# Patient Record
Sex: FEMALE | Race: WHITE | NOT HISPANIC OR LATINO | Employment: FULL TIME | URBAN - METROPOLITAN AREA
[De-identification: names, ages, dates, MRNs, and addresses within clinical notes are randomized per-mention and may not be internally consistent; named-entity substitution may affect disease eponyms.]

---

## 2017-01-05 ENCOUNTER — OFFICE VISIT (OUTPATIENT)
Dept: OTOLARYNGOLOGY | Facility: CLINIC | Age: 52
End: 2017-01-05

## 2017-01-05 VITALS — BODY MASS INDEX: 23.08 KG/M2 | HEIGHT: 64 IN | WEIGHT: 135.2 LBS

## 2017-01-05 DIAGNOSIS — J31.0 CHRONIC RHINITIS: ICD-10-CM

## 2017-01-05 DIAGNOSIS — J32.0 CHRONIC MAXILLARY SINUSITIS: Primary | ICD-10-CM

## 2017-01-05 DIAGNOSIS — J34.3 NASAL TURBINATE HYPERTROPHY: ICD-10-CM

## 2017-01-05 ASSESSMENT — PAIN SCALES - GENERAL: PAINLEVEL: NO PAIN (0)

## 2017-01-05 NOTE — Clinical Note
2017       RE: Iliana Mcfarlane  [address]  [facility] [address]     Dear Colleague,    Thank you for referring your patient, Iliana Mcfarlane, to the [facility] EAR NOSE AND THROAT at [facility]. Please see a copy of my visit note below.    The patient presents with a history of chronic sinusitis, rhinitis, allergies and asthma. These conditions have combined to cause her difficulty chronic eustachian tube dysfunction and coughing. She reports improvement of her symptoms with medical therapy, but as soon as she stops using the Cleocin Nasal Rinses, her symptoms redevelop.  The patient is using claritin and QNASL Nasal Spray as well as Cleocin Nasal Rinses. She has been using Albuterol for her asthma. Her evaluation with Dr. Best Judd was helpful in identifying allergy triggers and he has contributed to her medical management.       All other systems were reviewed and they are either negative or they are not directly pertinent to this Otolaryngology examination.      Past Medical History:    Past Medical History   Diagnosis Date     Allergic rhinitis, cause unspecified      Lumbago      Esophageal reflux      Menarche age 16     Genital herpes      Glaucoma      Menorrhagia      Depression, major      Hoarseness late      Allergic rhinitis late      Anxiety      Reduced vision        Past Surgical History:    Past Surgical History   Procedure Laterality Date     C nonspecific procedure       Upper GI endoscopy     C nonspecific procedure       Tonsillectomy     C nonspecific procedure  85     Oral surgery, wisdom teeth     C nonspecific procedure            C nonspecific procedure       Hysteroscopy, ablation of uterine fibroid     C nonspecific procedure       miscarriage     Surgical history of -        right eye trabeculectomy     Operative hysteroscopy with morcellator  2014     Procedure: OPERATIVE HYSTEROSCOPY  WITH MORCELLATOR;  Surgeon: Maureen Tavarez MD;  Location: [facility] OR     Gi surgery       dairy intolerence - resolved     Eye surgery  2011     trabeculectomy     Tonsillectomy  1987     Head & neck surgery       trabeculectomy, cataract, yag     Ent surgery       tonsillectomy       Medications:      Current outpatient prescriptions:      Lifitegrast (XIIDRA) 5 % SOLN, , Disp: , Rfl:      Beclomethasone Dipropionate (QNASL) 80 MCG/ACT AERS Nasal Spray, Spray 2 sprays into both nostrils daily, Disp: 8.7 g, Rfl: 3     sodium chloride 0.9%, bottle, 0.9 % irrigation, IRRIGATE 20 ML EACH NOSTRIL 4 TIMES DAILY FOR 2 MONTHS., Disp: 1000 mL, Rfl: 3     clindamycin (CLEOCIN) 150 MG capsule, Open one capsule and place in 1 Litre of Normal Saline and mix. Irrigate with 20 cc each nostril QID., Disp: 10 capsule, Rfl: 3     loratadine (CLARITIN) 10 MG tablet, , Disp: , Rfl:      sodium chloride 0.9 % SOLN, Irrigate 20 cc each nostril QID X 2 month supply, Disp: 51754 mL, Rfl: 3     Cyanocobalamin (VITAMIN B 12 PO), , Disp: , Rfl:      escitalopram (LEXAPRO) 20 MG tablet, Take 1 tablet (20 mg) by mouth daily, Disp: 90 tablet, Rfl: PRN     norethindrone-ethinyl estradiol (JUNEL FE 1/20) 1-20 MG-MCG per tablet, Take 1 tablet by mouth daily, Disp: 84 tablet, Rfl: PRN     traZODone (DESYREL) 50 MG tablet, Take 2 tablets (100 mg) by mouth nightly as needed for sleep, Disp: 180 tablet, Rfl: PRN     zolpidem (AMBIEN CR) 6.25 MG CR tablet, Take 1 tablet (6.25 mg) by mouth nightly as needed for sleep, Disp: 90 tablet, Rfl: 1     Omega-3 Fatty Acids (OMEGA-3 FISH OIL PO), Take 4 capsules by mouth daily , Disp: , Rfl:      CALCIUM-MAGNESIUM-ZINC PO, , Disp: , Rfl:      VITAMIN D, CHOLECALCIFEROL, PO, Take by mouth daily, Disp: , Rfl:      VITAMIN K PO, , Disp: , Rfl:     Allergies:    No known drug allergies    Physical Examination:    The patient is a well developed, well nourished female in no apparent distress.  She is normocephalic,  atraumatic with pupils equally round and reactive to light.    Oral Cavity Examination:  Normal mucosa with no masses or lesions  Nasal Examination: Much less congested nasal turbinates and nasal mucosa with no masses or lesions with purulent rhinorrhea. Septal deviation.   Neurological Examination: Facial nerve function intact and symmetric  Integumentary Examination: No lesions on the skin of the head and neck      Assessment and Plan:    The patient presents with a history of allergic rhinitis, asthma, chronic pharyngitis and lingual tonsillitis. The patient will continue to be treated with Normal Nasal Rinses in addition to her current use of Claritin and QNASL Nasal Spray. She will use CREST mouth rinse gargles twice daily and after each use of any inhaler medications. She will be referred to Dr. Sheri Salazar for further evaluation of possible efforts to prevent the sinus infections from recurring as soon as the Cleocin Nasal Rinses are discontinued.     Again, thank you for allowing me to participate in the care of your patient.      Sincerely,    Zion Quintana MD      CC: Dr. Sheri Salazar   CC: Janey Aguirre

## 2017-01-05 NOTE — MR AVS SNAPSHOT
After Visit Summary   1/5/2017    Iliana Mcfarlane    MRN: [id number]           Patient Information     Date Of Birth          1965        Visit Information        Provider Department      1/5/2017 8:45 AM Zion Quintana MD [facility] Ear Nose [facility] Throat        Today's Diagnoses     Chronic maxillary sinusitis    -  1     Chronic rhinitis         Nasal turbinate hypertrophy           Care Instructions    The patient presents with a history of allergic rhinitis, asthma, chronic pharyngitis and lingual tonsillitis. The patient will continue to be treated with Normal Nasal Rinses in addition to her current use of Claritin and QNASL Nasal Spray. She will use CREST mouth rinse gargles twice daily and after each use of any inhaler medications. She will be referred to Dr. Sheri Salazar for further evaluation of possible efforts to prevent the sinus infections from recurring as soon as the Cleocin Nasal Rinses are discontinued.         Follow-ups after your visit        Your next 10 appointments already scheduled     Feb 08, 2017  8:30 AM   (Arrive by 8:15 AM)   New Patient Visit with MD LEE Mason [facility] Ear Nose [facility] Throat ([facility])    [address] [place] [id number] [phone]              Who to contact     Please call your clinic at [phone] to:    Ask questions about your health    Make or cancel appointments    Discuss your medicines    Learn about your test results    Speak to your doctor   If you have compliments or concerns about an experience at your clinic, or if you wish to file a complaint, please contact [facility] Patient Relations at [phone] or email us at [email or web address]         Additional Information About Your Visit        MyChart Information     [employer] gives you secure access to your electronic health record. If you see a primary care provider, you can also  "send messages to your care team and make appointments. If you have questions, please call your primary care clinic.  If you do not have a primary care provider, please call [phone] and they will assist you.      [employer] is an electronic gateway that provides easy, online access to your medical records. With [employer], you can request a clinic appointment, read your test results, renew a prescription or communicate with your care team.     To access your existing account, please contact your [facility] or call [phone] for assistance.        Care EveryWhere ID     This is your Care EveryWhere ID. This could be used by other organizations to access your [facility] medical records  [id number]        Your Vitals Were     Height BMI (Body Mass Index)                1.62 m (5' 3.78\") 23.37 kg/m2           Blood Pressure from Last 3 Encounters:   11/23/16 133/84   09/27/16 122/70   09/23/16 102/66    Weight from Last 3 Encounters:   01/05/17 61.326 kg (135 lb 3.2 oz)   11/23/16 58.514 kg (129 lb)   09/27/16 58.514 kg (129 lb)              Today, you had the following     No orders found for display         Today's Medication Changes          These changes are accurate as of: 1/5/17  8:54 AM.  If you have any questions, ask your nurse or doctor.               Stop taking these medicines if you haven't already. Please contact your care team if you have questions.     albuterol 108 (90 BASE) MCG/ACT Inhaler   Commonly known as:  PROAIR HFA/PROVENTIL HFA/VENTOLIN HFA   Stopped by:  Zion Quintana MD           DOXYCYCLINE HYCLATE PO   Stopped by:  Zion Quintana MD           fluticasone 50 MCG/ACT spray   Commonly known as:  FLONASE   Stopped by:  Zion Quintana MD                    Primary Care Provider Office Phone # Fax #    Janey Aguirre [phone]       [facility] [address] [facility] [address]        Thank " you!     Thank you for choosing [facility] EAR NOSE AND THROAT  for your care. Our goal is always to provide you with excellent care. Hearing back from our patients is one way we can continue to improve our services. Please take a few minutes to complete the written survey that you may receive in the mail after your visit with us. Thank you!             Your Updated Medication List - Protect others around you: Learn how to safely use, store and throw away your medicines at www.disposemymeds.org.          This list is accurate as of: 1/5/17  8:54 AM.  Always use your most recent med list.                   Brand Name Dispense Instructions for use    Beclomethasone Dipropionate 80 MCG/ACT Aers Nasal Spray    QNASL    8.7 g    Spray 2 sprays into both nostrils daily       CALCIUM-MAGNESIUM-ZINC PO          clindamycin 150 MG capsule    CLEOCIN    10 capsule    Open one capsule and place in 1 Litre of Normal Saline and mix. Irrigate with 20 cc each nostril QID.       escitalopram 20 MG tablet    LEXAPRO    90 tablet    Take 1 tablet (20 mg) by mouth daily       loratadine 10 MG tablet    CLARITIN         norethindrone-ethinyl estradiol 1-20 MG-MCG per tablet    JUNEL FE 1/20    84 tablet    Take 1 tablet by mouth daily       OMEGA-3 FISH OIL PO      Take 4 capsules by mouth daily       sodium chloride 0.9 % Soln     41058 mL    Irrigate 20 cc each nostril QID X 2 month supply       sodium chloride 0.9% (bottle) 0.9 % irrigation     1000 mL    IRRIGATE 20 ML EACH NOSTRIL 4 TIMES DAILY FOR 2 MONTHS.       traZODone 50 MG tablet    DESYREL    180 tablet    Take 2 tablets (100 mg) by mouth nightly as needed for sleep       VITAMIN B 12 PO          VITAMIN D (CHOLECALCIFEROL) PO      Take by mouth daily       VITAMIN K PO          XIIDRA 5 % Soln   Generic drug:  Lifitegrast          zolpidem 6.25 MG CR tablet    AMBIEN CR    90 tablet    Take 1 tablet (6.25 mg) by mouth nightly as needed for sleep

## 2017-01-05 NOTE — PATIENT INSTRUCTIONS
The patient presents with a history of allergic rhinitis, asthma, chronic pharyngitis and lingual tonsillitis. The patient will continue to be treated with Normal Nasal Rinses in addition to her current use of Claritin and QNASL Nasal Spray. She will use CREST mouth rinse gargles twice daily and after each use of any inhaler medications. She will be referred to Dr. Sheri Salazar for further evaluation of possible efforts to prevent the sinus infections from recurring as soon as the Cleocin Nasal Rinses are discontinued.

## 2017-01-05 NOTE — PROGRESS NOTES
The patient presents with a history of chronic sinusitis, rhinitis, allergies and asthma. These conditions have combined to cause her difficulty chronic eustachian tube dysfunction and coughing. She reports improvement of her symptoms with medical therapy, but as soon as she stops using the Cleocin Nasal Rinses, her symptoms redevelop.  The patient is using claritin and QNASL Nasal Spray as well as Cleocin Nasal Rinses. She has been using Albuterol for her asthma. Her evaluation with Dr. Best Judd was helpful in identifying allergy triggers and he has contributed to her medical management.       All other systems were reviewed and they are either negative or they are not directly pertinent to this Otolaryngology examination.      Past Medical History:    Past Medical History   Diagnosis Date     Allergic rhinitis, cause unspecified      Lumbago      Esophageal reflux      Menarche age 16     Genital herpes      Glaucoma      Menorrhagia      Depression, major      Hoarseness late      Allergic rhinitis late      Anxiety      Reduced vision        Past Surgical History:    Past Surgical History   Procedure Laterality Date     C nonspecific procedure       Upper GI endoscopy     C nonspecific procedure       Tonsillectomy     C nonspecific procedure  85     Oral surgery, wisdom teeth     C nonspecific procedure            C nonspecific procedure       Hysteroscopy, ablation of uterine fibroid     C nonspecific procedure       miscarriage     Surgical history of -        right eye trabeculectomy     Operative hysteroscopy with morcellator  2014     Procedure: OPERATIVE HYSTEROSCOPY WITH MORCELLATOR;  Surgeon: Maureen Tavarez MD;  [facility]: [facility] OR     Gi surgery       dairy intolerence - resolved     Eye surgery       trabeculectomy     Tonsillectomy  1987     Head & neck surgery       trabeculectomy, cataract, yag     Ent surgery       tonsillectomy        Medications:      Current outpatient prescriptions:      Lifitegrast (XIIDRA) 5 % SOLN, , Disp: , Rfl:      Beclomethasone Dipropionate (QNASL) 80 MCG/ACT AERS Nasal Spray, Spray 2 sprays into both nostrils daily, Disp: 8.7 g, Rfl: 3     sodium chloride 0.9%, bottle, 0.9 % irrigation, IRRIGATE 20 ML EACH NOSTRIL 4 TIMES DAILY FOR 2 MONTHS., Disp: 1000 mL, Rfl: 3     clindamycin (CLEOCIN) 150 MG capsule, Open one capsule and place in 1 Litre of Normal Saline and mix. Irrigate with 20 cc each nostril QID., Disp: 10 capsule, Rfl: 3     loratadine (CLARITIN) 10 MG tablet, , Disp: , Rfl:      sodium chloride 0.9 % SOLN, Irrigate 20 cc each nostril QID X 2 month supply, Disp: 01007 mL, Rfl: 3     Cyanocobalamin (VITAMIN B 12 PO), , Disp: , Rfl:      escitalopram (LEXAPRO) 20 MG tablet, Take 1 tablet (20 mg) by mouth daily, Disp: 90 tablet, Rfl: PRN     norethindrone-ethinyl estradiol (JUNEL FE 1/20) 1-20 MG-MCG per tablet, Take 1 tablet by mouth daily, Disp: 84 tablet, Rfl: PRN     traZODone (DESYREL) 50 MG tablet, Take 2 tablets (100 mg) by mouth nightly as needed for sleep, Disp: 180 tablet, Rfl: PRN     zolpidem (AMBIEN CR) 6.25 MG CR tablet, Take 1 tablet (6.25 mg) by mouth nightly as needed for sleep, Disp: 90 tablet, Rfl: 1     Omega-3 Fatty Acids (OMEGA-3 FISH OIL PO), Take 4 capsules by mouth daily , Disp: , Rfl:      CALCIUM-MAGNESIUM-ZINC PO, , Disp: , Rfl:      VITAMIN D, CHOLECALCIFEROL, PO, Take by mouth daily, Disp: , Rfl:      VITAMIN K PO, , Disp: , Rfl:     Allergies:    No known drug allergies    Physical Examination:    The patient is a well developed, well nourished female in no apparent distress.  She is normocephalic, atraumatic with pupils equally round and reactive to light.    Oral Cavity Examination:  Normal mucosa with no masses or lesions  Nasal Examination: Much less congested nasal turbinates and nasal mucosa with no masses or lesions with purulent rhinorrhea. Septal deviation.    Neurological Examination: Facial nerve function intact and symmetric  Integumentary Examination: No lesions on the skin of the head and neck      Assessment and Plan:    The patient presents with a history of allergic rhinitis, asthma, chronic pharyngitis and lingual tonsillitis. The patient will continue to be treated with Normal Nasal Rinses in addition to her current use of Claritin and QNASL Nasal Spray. She will use CREST mouth rinse gargles twice daily and after each use of any inhaler medications. She will be referred to Dr. Sheri Salazar for further evaluation of possible efforts to prevent the sinus infections from recurring as soon as the Cleocin Nasal Rinses are discontinued.     CC: Dr. Sheri Salazar   CC: Janey Aguirre

## 2017-01-16 ENCOUNTER — MYC MEDICAL ADVICE (OUTPATIENT)
Dept: FAMILY MEDICINE | Facility: CLINIC | Age: 52
End: 2017-01-16

## 2017-01-16 DIAGNOSIS — F51.01 PRIMARY INSOMNIA: Primary | ICD-10-CM

## 2017-01-16 RX ORDER — TRAZODONE HYDROCHLORIDE 50 MG/1
100 TABLET, FILM COATED ORAL
Qty: 180 TABLET | Refills: 2 | Status: SHIPPED | OUTPATIENT
Start: 2017-01-16 | End: 2017-07-19

## 2017-01-19 ENCOUNTER — HOSPITAL ENCOUNTER (EMERGENCY)
Facility: CLINIC | Age: 52
Discharge: HOME | End: 2017-01-20
Attending: EMERGENCY MEDICINE | Admitting: EMERGENCY MEDICINE
Payer: COMMERCIAL

## 2017-01-19 ENCOUNTER — OFFICE VISIT (OUTPATIENT)
Dept: URGENT CARE | Facility: URGENT CARE | Age: 52
End: 2017-01-19
Payer: COMMERCIAL

## 2017-01-19 VITALS
HEIGHT: 64 IN | OXYGEN SATURATION: 98 % | DIASTOLIC BLOOD PRESSURE: 90 MMHG | WEIGHT: 133 LBS | BODY MASS INDEX: 22.71 KG/M2 | TEMPERATURE: 97.9 F | SYSTOLIC BLOOD PRESSURE: 149 MMHG | HEART RATE: 90 BPM

## 2017-01-19 DIAGNOSIS — R07.9 ACUTE CHEST PAIN: ICD-10-CM

## 2017-01-19 DIAGNOSIS — R07.89 ATYPICAL CHEST PAIN: ICD-10-CM

## 2017-01-19 LAB
ANION GAP SERPL CALCULATED.3IONS-SCNC: 6 MMOL/L (ref 3–14)
BASOPHILS # BLD AUTO: 0 10E9/L (ref 0–0.2)
BASOPHILS NFR BLD AUTO: 0.2 %
BUN SERPL-MCNC: 14 MG/DL (ref 7–30)
CALCIUM SERPL-MCNC: 8.9 MG/DL (ref 8.5–10.1)
CHLORIDE SERPL-SCNC: 104 MMOL/L (ref 94–109)
CO2 SERPL-SCNC: 29 MMOL/L (ref 20–32)
CREAT SERPL-MCNC: 0.89 MG/DL (ref 0.52–1.04)
DIFFERENTIAL METHOD BLD: NORMAL
EOSINOPHIL # BLD AUTO: 0.1 10E9/L (ref 0–0.7)
EOSINOPHIL NFR BLD AUTO: 1.2 %
ERYTHROCYTE [DISTWIDTH] IN BLOOD BY AUTOMATED COUNT: 13.7 % (ref 10–15)
GFR SERPL CREATININE-BSD FRML MDRD: 67 ML/MIN/1.7M2
GLUCOSE SERPL-MCNC: 94 MG/DL (ref 70–99)
HCT VFR BLD AUTO: 39.2 % (ref 35–47)
HGB BLD-MCNC: 12.9 G/DL (ref 11.7–15.7)
IMM GRANULOCYTES # BLD: 0 10E9/L (ref 0–0.4)
IMM GRANULOCYTES NFR BLD: 0.2 %
INTERPRETATION ECG - MUSE: NORMAL
LYMPHOCYTES # BLD AUTO: 2.7 10E9/L (ref 0.8–5.3)
LYMPHOCYTES NFR BLD AUTO: 41.8 %
MCH RBC QN AUTO: 31.2 PG (ref 26.5–33)
MCHC RBC AUTO-ENTMCNC: 32.9 G/DL (ref 31.5–36.5)
MCV RBC AUTO: 95 FL (ref 78–100)
MONOCYTES # BLD AUTO: 0.3 10E9/L (ref 0–1.3)
MONOCYTES NFR BLD AUTO: 4.7 %
NEUTROPHILS # BLD AUTO: 3.4 10E9/L (ref 1.6–8.3)
NEUTROPHILS NFR BLD AUTO: 51.9 %
NRBC # BLD AUTO: 0 10*3/UL
NRBC BLD AUTO-RTO: 0 /100
PLATELET # BLD AUTO: 247 10E9/L (ref 150–450)
POTASSIUM SERPL-SCNC: 3.4 MMOL/L (ref 3.4–5.3)
RBC # BLD AUTO: 4.13 10E12/L (ref 3.8–5.2)
SODIUM SERPL-SCNC: 139 MMOL/L (ref 133–144)
TROPONIN I BLD-MCNC: 0 UG/L (ref 0–0.1)
TROPONIN I SERPL-MCNC: NORMAL UG/L (ref 0–0.04)
TROPONIN I SERPL-MCNC: NORMAL UG/L (ref 0–0.04)
WBC # BLD AUTO: 6.4 10E9/L (ref 4–11)

## 2017-01-19 PROCEDURE — 84484 ASSAY OF TROPONIN QUANT: CPT | Performed by: EMERGENCY MEDICINE

## 2017-01-19 PROCEDURE — 99284 EMERGENCY DEPT VISIT MOD MDM: CPT | Performed by: EMERGENCY MEDICINE

## 2017-01-19 PROCEDURE — 93005 ELECTROCARDIOGRAM TRACING: CPT | Performed by: EMERGENCY MEDICINE

## 2017-01-19 PROCEDURE — 93010 ELECTROCARDIOGRAM REPORT: CPT | Mod: Z6 | Performed by: EMERGENCY MEDICINE

## 2017-01-19 PROCEDURE — 84484 ASSAY OF TROPONIN QUANT: CPT

## 2017-01-19 PROCEDURE — 99214 OFFICE O/P EST MOD 30 MIN: CPT | Performed by: PHYSICIAN ASSISTANT

## 2017-01-19 PROCEDURE — 80048 BASIC METABOLIC PNL TOTAL CA: CPT | Performed by: EMERGENCY MEDICINE

## 2017-01-19 PROCEDURE — 93000 ELECTROCARDIOGRAM COMPLETE: CPT | Performed by: PHYSICIAN ASSISTANT

## 2017-01-19 PROCEDURE — 85025 COMPLETE CBC W/AUTO DIFF WBC: CPT | Performed by: EMERGENCY MEDICINE

## 2017-01-19 PROCEDURE — 99284 EMERGENCY DEPT VISIT MOD MDM: CPT | Mod: 25 | Performed by: EMERGENCY MEDICINE

## 2017-01-19 ASSESSMENT — ENCOUNTER SYMPTOMS
DIAPHORESIS: 0
SHORTNESS OF BREATH: 0
NERVOUS/ANXIOUS: 1
PALPITATIONS: 0
CHEST TIGHTNESS: 1

## 2017-01-19 NOTE — ED AVS SNAPSHOT
[facility], Emergency Department    [address] [facility] [address]    Phone:  [phone]                                       Iliana Mcfarlane   MRN: [id number]    Department:  [facility], Emergency Department   Date of Visit:  1/19/2017           Patient Information     Date Of Birth          1965        Your diagnoses for this visit were:     Atypical chest pain        You were seen by Addy Aguilar MD.        Discharge Instructions          *CHEST PAIN, UNCERTAIN CAUSE    Based on your exam today, the exact cause of your chest pain is not certain. Your condition does not seem serious at this time, and your pain does not appear to be coming from your heart. However, sometimes the signs of a serious problem take more time to appear. Therefore, watch for the warning signs listed below.  HOME CARE:  1. Rest today and avoid strenuous activity.  2. Take any prescribed medicine as directed.  FOLLOW UP with your doctor in 1-3 days.   GET PROMPT MEDICAL ATTENTION if any of the following occur:    A change in the type of pain: if it feels different, becomes more severe, lasts longer, or begins to spread into your shoulder, arm, neck, jaw or back    Shortness of breath or increased pain with breathing    Weakness, dizziness, or fainting    Cough with blood or dark colored sputum (phlegm)    Fever over 101  F (38.3  C)    Swelling, pain or redness in one leg    9871-6035 [place] [address]. All rights reserved. This information is not intended as a substitute for professional medical care. Always follow your healthcare professional's instructions.      Future Appointments        Provider Department Dept Phone Center    2/8/2017 8:30 AM Sheri Salazar MD [facility] Ear Nose and Throat [phone] [facility]      24 Hour Appointment Hotline       To make an appointment at any [facility], call [phone] ([phone]). If you don't have a family  doctor or clinic, we will help you find one. [facility] clinics are conveniently located to serve the needs of you and your family.             Review of your medicines      Our records show that you are taking the medicines listed below. If these are incorrect, please call your family doctor or clinic.        Dose / Directions Last dose taken    Beclomethasone Dipropionate 80 MCG/ACT Aers Nasal Spray   Commonly known as:  QNASL   Dose:  2 spray   Quantity:  8.7 g        Spray 2 sprays into both nostrils daily   Refills:  3        CALCIUM-MAGNESIUM-ZINC PO        Refills:  0        clindamycin 150 MG capsule   Commonly known as:  CLEOCIN   Quantity:  10 capsule        Open one capsule and place in 1 Litre of Normal Saline and mix. Irrigate with 20 cc each nostril QID.   Refills:  3        escitalopram 20 MG tablet   Commonly known as:  LEXAPRO   Dose:  20 mg   Quantity:  90 tablet        Take 1 tablet (20 mg) by mouth daily   Refills:  PRN        loratadine 10 MG tablet   Commonly known as:  CLARITIN        Refills:  0        norethindrone-ethinyl estradiol 1-20 MG-MCG per tablet   Commonly known as:  JUNEL FE 1/20   Dose:  1 tablet   Quantity:  84 tablet        Take 1 tablet by mouth daily   Refills:  PRN        OMEGA-3 FISH OIL PO   Dose:  4 capsule        Take 4 capsules by mouth daily   Refills:  0        sodium chloride 0.9 % Soln   Quantity:  48051 mL        Irrigate 20 cc each nostril QID X 2 month supply   Refills:  3        sodium chloride 0.9% (bottle) 0.9 % irrigation   Quantity:  1000 mL        IRRIGATE 20 ML EACH NOSTRIL 4 TIMES DAILY FOR 2 MONTHS.   Refills:  3        traZODone 50 MG tablet   Commonly known as:  DESYREL   Dose:  100 mg   Quantity:  180 tablet        Take 2 tablets (100 mg) by mouth nightly as needed for sleep   Refills:  2        VITAMIN B 12 PO        Refills:  0        VITAMIN D (CHOLECALCIFEROL) PO        Take by mouth daily   Refills:  0        VITAMIN K PO        Refills:  0         XIIDRA 5 % Soln   Generic drug:  Lifitegrast        Refills:  0        zolpidem 6.25 MG CR tablet   Commonly known as:  AMBIEN CR   Dose:  6.25 mg   Quantity:  90 tablet        Take 1 tablet (6.25 mg) by mouth nightly as needed for sleep   Refills:  1                Procedures and tests performed during your visit     Procedure/Test Number of Times Performed    Basic metabolic panel 1    CBC with platelets differential 1    EKG 12 lead 1    Troponin I 2    Troponin POCT 1      Orders Needing Specimen Collection     None      Pending Results     No orders found for last 2 day(s).            Pending Culture Results     No orders found for last 2 day(s).            Thank you for choosing [facility]       Thank you for choosing [facility] for your care. Our goal is always to provide you with excellent care. Hearing back from our patients is one way we can continue to improve our services. Please take a few minutes to complete the written survey that you may receive in the mail after you visit with us. Thank you!        [employer] Information     [employer] gives you secure access to your electronic health record. If you see a primary care provider, you can also send messages to your care team and make appointments. If you have questions, please call your primary care clinic.  If you do not have a primary care provider, please call [phone] and they will assist you.        Care EveryWhere ID     This is your Care EveryWhere ID. This could be used by other organizations to access your [facility] medical records  [id number]        After Visit Summary       This is your record. Keep this with you and show to your community pharmacist(s) and doctor(s) at your next visit.

## 2017-01-19 NOTE — ED AVS SNAPSHOT
[facility] [place], Emergency Department    [address] [place] [address]    Phone:  [phone]                                       Iliana Mcfarlane   MRN: [id number]    Department:  [facility], Emergency Department   Date of Visit:  1/19/2017           After Visit Summary Signature Page     I have received my discharge instructions, and my questions have been answered. I have discussed any challenges I see with this plan with the nurse or doctor.    ..........................................................................................................................................  Patient/Patient Representative Signature      ..........................................................................................................................................  Patient Representative Print Name and Relationship to Patient    ..................................................               ................................................  Date                                            Time    ..........................................................................................................................................  Reviewed by Signature/Title    ...................................................              ..............................................  Date                                                            Time

## 2017-01-19 NOTE — MR AVS SNAPSHOT
After Visit Summary   1/19/2017    Iliana Mcfarlane    MRN: [id number]           Patient Information     Date Of Birth          1965        Visit Information        Provider Department      1/19/2017 7:00 PM Lizette Jimenez PA-C [facility]        Today's Diagnoses     Chest pain    -  1        Follow-ups after your visit        Your next 10 appointments already scheduled     Feb 08, 2017  8:30 AM   (Arrive by 8:15 AM)   New Patient Visit with Sheri Salazar MD   [facility] ([facility] [place])    [address] [place] [id number] [phone]              Who to contact     If you have questions or need follow up information about today's clinic visit or your schedule please contact [facility] directly at [phone].  Normal or non-critical lab and imaging results will be communicated to you by MyChart, letter or phone within 4 business days after the clinic has received the results. If you do not hear from us within 7 days, please contact the clinic through [employer] or phone. If you have a critical or abnormal lab result, we will notify you by phone as soon as possible.  Submit refill requests through "[employer] or call your pharmacy and they will forward the refill request to us. Please allow 3 business days for your refill to be completed.          Additional Information About Your Visit        MyChart Information     "[employer] gives you secure access to your electronic health record. If you see a primary care provider, you can also send messages to your care team and make appointments. If you have questions, please call your primary care clinic.  If you do not have a primary care provider, please call [phone] and they will assist you.        Care EveryWhere ID     This is your Care EveryWhere ID. This could be used by other organizations to access your [facility]  "records  [id number]        Your Vitals Were     Pulse Temperature Height BMI (Body Mass Index) Pulse Oximetry       90 97.9  F (36.6  C) (Tympanic) 5' 4\" (1.626 m) 22.82 kg/m2 98%        Blood Pressure from Last 3 Encounters:   01/19/17 149/90   11/23/16 133/84   09/27/16 122/70    Weight from Last 3 Encounters:   01/19/17 133 lb (60.328 kg)   01/05/17 135 lb 3.2 oz (61.326 kg)   11/23/16 129 lb (58.514 kg)              We Performed the Following     EKG 12-lead complete w/read - Clinics        Primary Care Provider Office Phone # Fax #    Janey Aguirre [phone]       [facility] [address] [place]  [facility] [address]        Thank you!     Thank you for choosing [facility]RG[facility]  for your care. Our goal is always to provide you with excellent care. Hearing back from our patients is one way we can continue to improve our services. Please take a few minutes to complete the written survey that you may receive in the mail after your visit with us. Thank you!             Your Updated Medication List - Protect others around you: Learn how to safely use, store and throw away your medicines at www.disposemymeds.org.          This list is accurate as of: 1/19/17  7:59 PM.  Always use your most recent med list.                   Brand Name Dispense Instructions for use    Beclomethasone Dipropionate 80 MCG/ACT Aers Nasal Spray    QNASL    8.7 g    Spray 2 sprays into both nostrils daily       CALCIUM-MAGNESIUM-ZINC PO          clindamycin 150 MG capsule    CLEOCIN    10 capsule    Open one capsule and place in 1 Litre of Normal Saline and mix. Irrigate with 20 cc each nostril QID.       escitalopram 20 MG tablet    LEXAPRO    90 tablet    Take 1 tablet (20 mg) by mouth daily       loratadine 10 MG tablet    CLARITIN         norethindrone-ethinyl estradiol 1-20 MG-MCG per tablet    JUNEL FE 1/20    84 tablet    Take 1 tablet by mouth daily       OMEGA-3 FISH OIL PO     "  Take 4 capsules by mouth daily       sodium chloride 0.9 % Soln     43752 mL    Irrigate 20 cc each nostril QID X 2 month supply       sodium chloride 0.9% (bottle) 0.9 % irrigation     1000 mL    IRRIGATE 20 ML EACH NOSTRIL 4 TIMES DAILY FOR 2 MONTHS.       traZODone 50 MG tablet    DESYREL    180 tablet    Take 2 tablets (100 mg) by mouth nightly as needed for sleep       VITAMIN B 12 PO          VITAMIN D (CHOLECALCIFEROL) PO      Take by mouth daily       VITAMIN K PO          XIIDRA 5 % Soln   Generic drug:  Lifitegrast          zolpidem 6.25 MG CR tablet    AMBIEN CR    90 tablet    Take 1 tablet (6.25 mg) by mouth nightly as needed for sleep

## 2017-01-20 VITALS
BODY MASS INDEX: 22.2 KG/M2 | OXYGEN SATURATION: 97 % | HEART RATE: 84 BPM | DIASTOLIC BLOOD PRESSURE: 89 MMHG | RESPIRATION RATE: 9 BRPM | SYSTOLIC BLOOD PRESSURE: 137 MMHG | WEIGHT: 130 LBS | HEIGHT: 64 IN | TEMPERATURE: 98.6 F

## 2017-01-20 NOTE — DISCHARGE INSTRUCTIONS
*CHEST PAIN, UNCERTAIN CAUSE    Based on your exam today, the exact cause of your chest pain is not certain. Your condition does not seem serious at this time, and your pain does not appear to be coming from your heart. However, sometimes the signs of a serious problem take more time to appear. Therefore, watch for the warning signs listed below.  HOME CARE:  1. Rest today and avoid strenuous activity.  2. Take any prescribed medicine as directed.  FOLLOW UP with your doctor in 1-3 days.   GET PROMPT MEDICAL ATTENTION if any of the following occur:    A change in the type of pain: if it feels different, becomes more severe, lasts longer, or begins to spread into your shoulder, arm, neck, jaw or back    Shortness of breath or increased pain with breathing    Weakness, dizziness, or fainting    Cough with blood or dark colored sputum (phlegm)    Fever over 101  F (38.3  C)    Swelling, pain or redness in one leg    [id number] [address], [place] [address]. All rights reserved. This information is not intended as a substitute for professional medical care. Always follow your healthcare professional's instructions.

## 2017-01-20 NOTE — ED NOTES
Pt is sent to us from clinic to have a cardiac work up. Has had chest tightness for one week, some SOB today, now resolved. H/O anxiety also. EKG from clinic shows SR.

## 2017-01-20 NOTE — NURSING NOTE
"Chief Complaint   Patient presents with     Urgent Care     Pt in clinic to have eval for SOB, headache, chest tightness and pain, and tingling of the hands.     Respiratory Problems     Chest Pain     Numbness     Headache       Initial /90 mmHg  Pulse 90  Temp(Src) 97.9  F (36.6  C) (Tympanic)  Ht 5' 4\" (1.626 m)  Wt 133 lb (60.328 kg)  BMI 22.82 kg/m2  SpO2 98% Estimated body mass index is 22.82 kg/(m^2) as calculated from the following:    Height as of this encounter: 5' 4\" (1.626 m).    Weight as of this encounter: 133 lb (60.328 kg).  BP completed using cuff size: regular  Antonette Pretty/ MA    "

## 2017-01-20 NOTE — ED PROVIDER NOTES
[facility] EMERGENCY DEPARTMENT ([facility])  2017  ED 22   History     Chief Complaint   Patient presents with     Chest Pain     The history is provided by the patient and medical records.     Iliana Mcfarlane is a 52 year old female who presents from clinic for further evaluation of chest pain. She presented to clinic this evening with left sided chest tightness for the past week.  She had an EKG done and was sent to the ER for troponins and further workup. Patient reports chest pain that radiates into her left upper back. The pain comes and goes. When it comes it lasts for a few hours. She states that today the pain was the worst that it has been. No shortness of breath, palpitations, diaphoresis, or fatigue. Her pain has improved and she thinks her chest pain is anxiety related.     I have reviewed the Medications, Allergies, Past Medical and Surgical History, and Social History in the [employer] system.  PAST MEDICAL HISTORY:   Past Medical History   Diagnosis Date     Allergic rhinitis, cause unspecified      Lumbago      Esophageal reflux      Menarche age 16     Genital herpes      Glaucoma      Menorrhagia      Depression, major      Hoarseness late      Allergic rhinitis late      Anxiety      Reduced vision        PAST SURGICAL HISTORY:   Past Surgical History   Procedure Laterality Date     C nonspecific procedure       Upper GI endoscopy     C nonspecific procedure       Tonsillectomy     C nonspecific procedure  87     Oral surgery, wisdom teeth     C nonspecific procedure            C nonspecific procedure       Hysteroscopy, ablation of uterine fibroid     C nonspecific procedure       miscarriage     Surgical history of -        right eye trabeculectomy     Operative hysteroscopy with morcellator  2014     Procedure: OPERATIVE HYSTEROSCOPY WITH MORCELLATOR;  Surgeon: Maureen Tavarez MD;  Location: [facility] OR     Gi surgery       dairy intolerence -  resolved     Eye surgery  2011     trabeculectomy     Tonsillectomy  1987     Head & neck surgery       trabeculectomy, cataract, yag     Ent surgery       tonsillectomy       FAMILY HISTORY:   Family History   Problem Relation Age of Onset     Hypertension Mother      DIABETES Paternal Grandmother      Alcohol/Drug Paternal Grandfather      HEART DISEASE Maternal Grandmother      CEREBROVASCULAR DISEASE Paternal Grandmother      Breast Cancer Maternal Aunt      CANCER Maternal Aunt      brain     Alzheimer Disease Maternal Grandmother      not until mid-90 yrs     CANCER Father      CANCER Maternal Grandfather      CANCER Paternal Grandfather      Other Cancer Father      Obesity Mother        SOCIAL HISTORY:   Social History   Substance Use Topics     Smoking status: Never Smoker      Smokeless tobacco: Never Used     Alcohol Use: 0.0 oz/week     0 Standard drinks or equivalent per week      Comment: minimal       Discharge Medication List as of 1/20/2017 12:11 AM      CONTINUE these medications which have NOT CHANGED    Details   Lifitegrast (XIIDRA) 5 % SOLN Historical      traZODone (DESYREL) 50 MG tablet Take 2 tablets (100 mg) by mouth nightly as needed for sleep, Disp-180 tablet, R-2, E-PrescribeRefill until 8/31/17      Beclomethasone Dipropionate (QNASL) 80 MCG/ACT AERS Nasal Spray Spray 2 sprays into both nostrils daily, Disp-8.7 g, R-3, Fax      sodium chloride 0.9%, bottle, 0.9 % irrigation IRRIGATE 20 ML EACH NOSTRIL 4 TIMES DAILY FOR 2 MONTHS., Disp-1000 mL, R-3, Fax      clindamycin (CLEOCIN) 150 MG capsule Open one capsule and place in 1 Litre of Normal Saline and mix. Irrigate with 20 cc each nostril QID., Disp-10 capsule, R-3, Fax      loratadine (CLARITIN) 10 MG tablet Historical      sodium chloride 0.9 % SOLN Irrigate 20 cc each nostril QID X 2 month supplyDisp-10366 mL, [id number]      Cyanocobalamin (VITAMIN B 12 PO) Historical      escitalopram (LEXAPRO) 20 MG tablet Take 1 tablet (20  "mg) by mouth daily, Disp-90 tablet, R-PRN, Fax      norethindrone-ethinyl estradiol (JUNEL FE 1/20) 1-20 MG-MCG per tablet Take 1 tablet by mouth daily, Disp-84 tablet, R-PRN, Fax      zolpidem (AMBIEN CR) 6.25 MG CR tablet Take 1 tablet (6.25 mg) by mouth nightly as needed for sleep, Disp-90 tablet, R-1, Local Print      Omega-3 Fatty Acids (OMEGA-3 FISH OIL PO) Take 4 capsules by mouth daily , Historical      CALCIUM-MAGNESIUM-ZINC PO Historical      VITAMIN D, CHOLECALCIFEROL, PO Take by mouth daily, Historical      VITAMIN K PO Historical                Allergies   Allergen Reactions     No Known Drug Allergies         Review of Systems   Constitutional: Negative for diaphoresis.   Respiratory: Positive for chest tightness. Negative for shortness of breath.    Cardiovascular: Positive for chest pain. Negative for palpitations.   Psychiatric/Behavioral: The patient is nervous/anxious.    All other systems reviewed and are negative.      Physical Exam   BP: (!) 145/91 mmHg  Pulse: 84  Temp: 98.6  F (37  C)  Resp: 16  Height: 162.6 cm (5' 4\")  Weight: 58.968 kg (130 lb)  SpO2: 98 %  Physical Exam Exam:  Constitutional: healthy, alert and no distress  Head: Normocephalic. No masses, lesions, tenderness or abnormalities  Neck: Neck supple. No adenopathy. Thyroid symmetric, normal size,, Carotids without bruits.  ENT: ENT exam normal, no neck nodes or sinus tenderness  Cardiovascular: negative, PMI normal. No lifts, heaves, or thrills. RRR. No murmurs, clicks gallops or rub  Respiratory: negative, Percussion normal. Good diaphragmatic excursion. Lungs clear  Gastrointestinal: Abdomen soft, non-tender. BS normal. No masses, organomegaly  : Deferred  Musculoskeletal: extremities normal- no gross deformities noted, gait normal and normal muscle tone  Skin: no suspicious lesions or rashes  Neurologic: Gait normal. Reflexes normal and symmetric. Sensation grossly WNL.  Psychiatric: mentation appears normal and affect " normal/bright  Hematologic/Lymphatic/Immunologic: Normal cervical lymph nodes      ED Course   Procedures             EKG Interpretation:      Interpreted by Addy Aguilar  Time reviewed: 21:07 PM  Symptoms at time of EKG: chest tightness   Rhythm: normal sinus   Rate: 79 bpm  Axis: normal  Ectopy: none  Conduction: normal  ST Segments/ T Waves: No ST-T wave changes  Q Waves: none  Comparison to prior: Unchanged from earlier today    Clinical Impression: normal EKG          Critical Care time:  none               Labs Ordered and Resulted from Time of ED Arrival Up to the Time of Departure from the ED   CBC WITH PLATELETS DIFFERENTIAL   BASIC METABOLIC PANEL   TROPONIN I   TROPONIN I   TROPONIN POCT       Assessments & Plan (with Medical Decision Making)     This is a 52-year-old female who is here with left-sided chest pain that is described as fairly sudden in onset.  Patient has had these symptoms for the last week and is here for further evaluation.  Physical exam is unremarkable with no evidence of any acute tenderness or any other evidence of acute findings.  EKG also as noted above.  Troponin I was checked for any possible ischemic episode and essentially normal.  2nd troponin was also to look for any evidence of changes.  I was also normal.  I do not believe that this is any type of ischemic episode involving the hard nor do I think this is PE related symptoms is no evidence of any shortness of breath and tachycardia.  Patient to be discharged home with atypical chest pain instructions to follow-up and return if any worsening symptoms.  I have reviewed the nursing notes.    I have reviewed the findings, diagnosis, plan and need for follow up with the patient.    Discharge Medication List as of 1/20/2017 12:11 AM          Final diagnoses:   Atypical chest pain   Noah OLMEDO, am serving as a trained medical scribe to document services personally performed by Addy Aguilar MD, based on the provider's statements  to me.      I, Addy Aguilar MD, was physically present and have reviewed and verified the accuracy of this note documented by Noah Power.       1/19/2017   [facility], [place], EMERGENCY DEPARTMENT      Addy Aguilar MD  02/03/17 2726

## 2017-01-20 NOTE — PROGRESS NOTES
"HPI  Iliana is a 51 yo female who presents for left sided Chest tightness x 1 week.  Reports tightness worse today and she also has been experiencing SOB today.  No N&V.  No diaphoresis. Reports tingling in her fingers BL.  Reports frontal and maxillary headache. Headache is dull and is not the worst headache of her life.  She denies dizziness or being lightheaded.    Reports several surgeries for glaucoma this year and has had some recurrent double vision that is attributed to \"dry eyes\" by her provider.  She reports her vision feels a bit worse today.    Hx of recurrent Sinus infections and is seeing specialist about this.  Reports night cough that she attributes to post nasal drip.     Patient states she does have a hx of anxiety.  She sometimes takes Trazodone at night for this.   She has had GERD in the past.     Reports no personal or family hx of HTN or heart disease.      ROS    See HPI  Physical Exam    Vitals & nursing notes reviewed.  B/P: 149/90, T: 97.9, P: 90, R: Data Unavailable  Constitutional:  Alert, well nourished, well-developed, NAD  Head:  Atraumatic, normocephalic  Eyes:  Perrla, EOMI, conjunctiva:  Pink   Sclera:  Anicteric  Ears:  Canals clear BL, TM pearly BL  Throat:  No erythema, exudates, or edema to postoropharynx  Neck:  Supple, (+) cervical LAD  Lungs:  CTA, no wheezes, rhonchi, or rales  CV:  RRR,  no murmur appreciated    EKG:  Sinus rhythm.  Low Voltage in precordial leads.  No ST changes.    ASSESSMENT  (R07.9) Chest pain  (primary encounter diagnosis)  Comment: Consulted with Dr. Dennison who felt it was best to sent patient to ER for Troponins & further workup/eval.   As EKG did not indicate ST changes, Felt it was safe for patient to go to ER by car.     Plan: Patient to go to [facility] ER [facility].    [facility] know [facility] was coming.        "

## 2017-02-08 ENCOUNTER — OFFICE VISIT (OUTPATIENT)
Dept: OTOLARYNGOLOGY | Facility: CLINIC | Age: 52
End: 2017-02-08

## 2017-02-08 VITALS — WEIGHT: 138 LBS | BODY MASS INDEX: 23.56 KG/M2 | HEIGHT: 64 IN

## 2017-02-08 DIAGNOSIS — R05.9 COUGH: Primary | ICD-10-CM

## 2017-02-08 DIAGNOSIS — J31.0 CHRONIC RHINITIS: ICD-10-CM

## 2017-02-08 ASSESSMENT — PAIN SCALES - GENERAL: PAINLEVEL: MILD PAIN (3)

## 2017-02-08 NOTE — MR AVS SNAPSHOT
After Visit Summary   2017    Iliana Mcfarlane    MRN: [id number]           Patient Information     Date Of Birth          1965        Visit Information        Provider Department      2017 8:30 AM Sheri Salazar MD [facility]        Today's Diagnoses     Cough    -  1    Chronic rhinitis          Care Instructions    Plan of care:  Take Zantac as discussed with Dr Salazar  Follow up as needed  Clinic contact information:  1. To schedule an appointment call [phone], option 1  2. To talk to the Triage RN call [phone], option 3  3. If you need to speak to Maureen or get a message to your doctor on a Friday, call the triage RN  4. MaureenRN: [phone]  5. Surgery scheduling:      Marj Boston: [phone]      Arely Parr: [phone]  6. Fax: [phone]  7. [phone]          Follow-ups after your visit        Who to contact     Please call your clinic at [phone] to:    Ask questions about your health    Make or cancel appointments    Discuss your medicines    Learn about your test results    Speak to your doctor   If you have compliments or concerns about an experience at your clinic, or if you wish to file a complaint, please contact [facility] Patient Relations at [phone] or email us at [email or web address]         Additional Information About Your Visit        MyChart Information     [employer] gives you secure access to your electronic health record. If you see a primary care provider, you can also send messages to your care team and make appointments. If you have questions, please call your primary care clinic.  If you do not have a primary care provider, please call [phone] and they will assist you.      [employer] is an electronic gateway that provides easy, online access to your medical records. With [employer], you can request a clinic appointment, read your test results, renew a prescription  "or communicate with your care team.     To access your existing account, please contact your [facility] or call [phone] for assistance.        Care EveryWhere ID     This is your Care EveryWhere ID. This could be used by other organizations to access your [place] medical records  [id number]        Your Vitals Were     Height Last Period BMI (Body Mass Index)             1.613 m (5' 3.5\") 12/30/2016 (Approximate) 24.06 kg/m2          Blood Pressure from Last 3 Encounters:   01/20/17 137/89   01/19/17 149/90   11/23/16 133/84    Weight from Last 3 Encounters:   02/08/17 62.6 kg (138 lb)   01/19/17 59 kg (130 lb)   01/19/17 60.3 kg (133 lb)              Today, you had the following     No orders found for display       Primary Care Provider Office Phone # Fax #    Janey Aguirre [phone]       [facility] [address] [facility] [address]        Thank you!     Thank you for choosing [facility]  for your care. Our goal is always to provide you with excellent care. Hearing back from our patients is one way we can continue to improve our services. Please take a few minutes to complete the written survey that you may receive in the mail after your visit with us. Thank you!             Your Updated Medication List - Protect others around you: Learn how to safely use, store and throw away your medicines at www.disposemymeds.org.          This list is accurate as of: 2/8/17 11:59 PM.  Always use your most recent med list.                   Brand Name Dispense Instructions for use    Beclomethasone Dipropionate 80 MCG/ACT Aers Nasal Spray    QNASL    8.7 g    Spray 2 sprays into both nostrils daily       CALCIUM-MAGNESIUM-ZINC PO          clindamycin 150 MG capsule    CLEOCIN    10 capsule    Open one capsule and place in 1 Litre of Normal Saline and mix. Irrigate with 20 cc each nostril QID.       escitalopram 20 MG tablet    " LEXAPRO    90 tablet    Take 1 tablet (20 mg) by mouth daily       FLUVIRIN Susp   Generic drug:  Influenza Vac Typ A&B Surf Ant      ADM 0.5ML IM UTD       loratadine 10 MG tablet    CLARITIN         norethindrone-ethinyl estradiol 1-20 MG-MCG per tablet    JUNEL FE 1/20    84 tablet    Take 1 tablet by mouth daily       OMEGA-3 FISH OIL PO      Take 4 capsules by mouth daily       sodium chloride 0.9 % Soln     30832 mL    Irrigate 20 cc each nostril QID X 2 month supply       sodium chloride 0.9% (bottle) 0.9 % irrigation     1000 mL    IRRIGATE 20 ML EACH NOSTRIL 4 TIMES DAILY FOR 2 MONTHS.       traZODone 50 MG tablet    DESYREL    180 tablet    Take 2 tablets (100 mg) by mouth nightly as needed for sleep       VITAMIN B 12 PO          VITAMIN D (CHOLECALCIFEROL) PO      Take by mouth daily       VITAMIN K PO          XIIDRA 5 % Soln   Generic drug:  Lifitegrast          zolpidem 6.25 MG CR tablet    AMBIEN CR    90 tablet    Take 1 tablet (6.25 mg) by mouth nightly as needed for sleep

## 2017-02-08 NOTE — PROGRESS NOTES
HISTORY OF PRESENT ILLNESS:  Iliana Mcfarlane is a very pleasant woman who has seen Dr. Quintana and Dr. Judd for chronic rhinitis issues.  She has a known history of allergies.  She has had immunotherapy in the past.  She states that since last July, she has been having intermittent problems with earaches, nasal discharge, and coughing.  She is currently using Qnasl and had been on Flonase previously.  She is using saline irrigations, and Dr. Quintana had her on Cleocin irrigations.  She was concerned because after Cleocin irrigations, she would develop recurrent bouts of yellow thick green nasal drainage and would have to go back on Cleocin frequently.  She has had a CT scan during this timeframe which was completely normal with regards to her paranasal sinuses.  She has a nighttime cough.  She had used an inhaler in the past and that was helpful.  She states that she feels like a smoker.  This sometimes wakes her from sleep.  She does feel like it is associated with postnasal drainage.  She is concerned that her postnasal drainage causes heartburn symptoms.  She has never been treated for this.      PHYSICAL EXAMINATION:  Her examination today reveals an adult female in no acute distress.  Head, face, scalp are normal.  Ears demonstrate normal canals, auricles and tympanic membranes.  She has no TMJ tenderness but she does have some mild masseter tenderness on the left.  The oral cavity and oropharyngeal exam is unremarkable.  Nasal exam reveals a straight septum.  She has mucus stranding anteriorly.  Otherwise, nasal passages look quite healthy.  No cervical adenopathy.      ASSESSMENT AND PLAN:  This patient has chronic rhinitis, no evidence of rhinosinusitis.  She also has an irritating nighttime cough that affects her sleep which could be due to reflux disease.  I explained to her that postnasal drip does not cause reflux but it could be a sign of reflux.  She is willing to take some Zantac at night before  bed to see if this helps.  I reassured her that her CT scan was normal and that the symptoms that she is experiencing are more consistent with rhinitis and that this can be treated topically with saline irrigations.  If she has thick drainage, she could go ahead and use a Cleocin rinse as well.  The patient also has a dry crackly voice which tells me she is a little too dry.  I encouraged increased water intake and reduction of any medications that could be drying her out and also to increase humidity in her environment.  She is amenable to this as well.  She will see me back if she does not have improvement in her symptoms.  She seems comfortable with this plan.

## 2017-02-08 NOTE — LETTER
2/8/2017       RE: Iliana Mcfarlane  [address]  [facility] [address]     Dear Colleague,    Thank you for referring your patient, Iliana Mcfarlane, to the [facility] EAR NO[facility] AND THROAT at [facility]. Please see a copy of my visit note below.    HISTORY OF PRESENT ILLNESS:  Iliana Mcfarlane is a very pleasant woman who has seen Dr. Quintana and Dr. Judd for chronic rhinitis issues.  She has a known history of allergies.  She has had immunotherapy in the past.  She states that since last July, she has been having intermittent problems with earaches, nasal discharge, and coughing.  She is currently using Qnasl and had been on Flonase previously.  She is using saline irrigations, and Dr. Quintana had her on Cleocin irrigations.  She was concerned because after Cleocin irrigations, she would develop recurrent bouts of yellow thick green nasal drainage and would have to go back on Cleocin frequently.  She has had a CT scan during this timeframe which was completely normal with regards to her paranasal sinuses.  She has a nighttime cough.  She had used an inhaler in the past and that was helpful.  She states that she feels like a smoker.  This sometimes wakes her from sleep.  She does feel like it is associated with postnasal drainage.  She is concerned that her postnasal drainage causes heartburn symptoms.  She has never been treated for this.      PHYSICAL EXAMINATION:  Her examination today reveals an adult female in no acute distress.  Head, face, scalp are normal.  Ears demonstrate normal canals, auricles and tympanic membranes.  She has no TMJ tenderness but she does have some mild masseter tenderness on the left.  The oral cavity and oropharyngeal exam is unremarkable.  Nasal exam reveals a straight septum.  She has mucus stranding anteriorly.  Otherwise, nasal passages look quite healthy.  No cervical adenopathy.      ASSESSMENT AND PLAN:  This patient has  chronic rhinitis, no evidence of rhinosinusitis.  She also has an irritating nighttime cough that affects her sleep which could be due to reflux disease.  I explained to her that postnasal drip does not cause reflux but it could be a sign of reflux.  She is willing to take some Zantac at night before bed to see if this helps.  I reassured her that her CT scan was normal and that the symptoms that she is experiencing are more consistent with rhinitis and that this can be treated topically with saline irrigations.  If she has thick drainage, she could go ahead and use a Cleocin rinse as well.  The patient also has a dry crackly voice which tells me she is a little too dry.  I encouraged increased water intake and reduction of any medications that could be drying her out and also to increase humidity in her environment.  She is amenable to this as well.  She will see me back if she does not have improvement in her symptoms.  She seems comfortable with this plan.       Again, thank you for allowing me to participate in the care of your patient.      Sincerely,    Sheri Salazar MD

## 2017-02-08 NOTE — PATIENT INSTRUCTIONS
Plan of care:  Take Zantac as discussed with Dr Salazar  Follow up as needed  Clinic contact information:  1. To schedule an appointment call [phone], option 1  2. To talk to the Triage RN call [phone], option 3  3. If you need to speak to Maureen or get a message to your doctor on a Friday, call the triage RN  4. MaureenRN: [phone]  5. Surgery scheduling:      Marj Boston: [phone]      Arely Parr: [phone]  6. Fax: [phone]  7. [phone]

## 2017-02-08 NOTE — NURSING NOTE
Chief Complaint   Patient presents with     Consult     chronic maxiallry sinusitis     Marta Kwong Medical Assistant

## 2017-03-22 ENCOUNTER — OFFICE VISIT (OUTPATIENT)
Dept: FAMILY MEDICINE | Facility: CLINIC | Age: 52
End: 2017-03-22
Payer: COMMERCIAL

## 2017-03-22 VITALS
DIASTOLIC BLOOD PRESSURE: 85 MMHG | SYSTOLIC BLOOD PRESSURE: 124 MMHG | HEART RATE: 84 BPM | OXYGEN SATURATION: 97 % | BODY MASS INDEX: 24.3 KG/M2 | WEIGHT: 139.38 LBS | TEMPERATURE: 98.2 F | RESPIRATION RATE: 18 BRPM

## 2017-03-22 DIAGNOSIS — F51.01 PRIMARY INSOMNIA: ICD-10-CM

## 2017-03-22 DIAGNOSIS — Z12.11 SPECIAL SCREENING FOR MALIGNANT NEOPLASMS, COLON: ICD-10-CM

## 2017-03-22 DIAGNOSIS — F41.9 ANXIETY: Primary | ICD-10-CM

## 2017-03-22 PROCEDURE — 99214 OFFICE O/P EST MOD 30 MIN: CPT | Performed by: NURSE PRACTITIONER

## 2017-03-22 RX ORDER — BUSPIRONE HYDROCHLORIDE 5 MG/1
TABLET ORAL
Qty: 150 TABLET | Refills: 0 | Status: SHIPPED | OUTPATIENT
Start: 2017-03-22 | End: 2017-04-11

## 2017-03-22 RX ORDER — ZOLPIDEM TARTRATE 6.25 MG/1
6.25 TABLET, FILM COATED, EXTENDED RELEASE ORAL
Qty: 90 TABLET | Refills: 1 | Status: SHIPPED | OUTPATIENT
Start: 2017-03-22 | End: 2017-07-19

## 2017-03-22 ASSESSMENT — ANXIETY QUESTIONNAIRES
GAD7 TOTAL SCORE: 8
6. BECOMING EASILY ANNOYED OR IRRITABLE: MORE THAN HALF THE DAYS
2. NOT BEING ABLE TO STOP OR CONTROL WORRYING: NOT AT ALL
3. WORRYING TOO MUCH ABOUT DIFFERENT THINGS: NOT AT ALL
5. BEING SO RESTLESS THAT IT IS HARD TO SIT STILL: NOT AT ALL
1. FEELING NERVOUS, ANXIOUS, OR ON EDGE: NEARLY EVERY DAY
7. FEELING AFRAID AS IF SOMETHING AWFUL MIGHT HAPPEN: NOT AT ALL

## 2017-03-22 ASSESSMENT — PATIENT HEALTH QUESTIONNAIRE - PHQ9: 5. POOR APPETITE OR OVEREATING: NEARLY EVERY DAY

## 2017-03-22 NOTE — PATIENT INSTRUCTIONS
Start at 5 mg twice daily for 3 days, then 7.5 mg (1.5 tabs) twice daily for 3 days, then 10 mg (2 tabs) twice daily for 3 days, then 12.5 mg (2.5 tabs) twice daily for 3 days, then 15 mg (3 tabs) twice daily and stay at that dose    Follow up in one month.
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
## 2017-03-22 NOTE — MR AVS SNAPSHOT
After Visit Summary   3/22/2017    Iliana Mcfarlane    MRN: [id number]           Patient Information     Date Of Birth          1965        Visit Information        Provider Department      3/22/2017 10:30 AM Janey Aguirre NP [facility]        Today's Diagnoses     Anxiety    -  1    Primary insomnia          Care Instructions    Start at 5 mg twice daily for 3 days, then 7.5 mg (1.5 tabs) twice daily for 3 days, then 10 mg (2 tabs) twice daily for 3 days, then 12.5 mg (2.5 tabs) twice daily for 3 days, then 15 mg (3 tabs) twice daily and stay at that dose    Follow up in one month.         Follow-ups after your visit        Who to contact     If you have questions or need follow up information about today's clinic visit or your schedule please contact [facility] directly at [phone].  Normal or non-critical lab and imaging results will be communicated to you by MyChart, letter or phone within 4 business days after the clinic has received the results. If you do not hear from us within 7 days, please contact the clinic through [employer] or phone. If you have a critical or abnormal lab result, we will notify you by phone as soon as possible.  Submit refill requests through [employer] or call your pharmacy and they will forward the refill request to us. Please allow 3 business days for your refill to be completed.          Additional Information About Your Visit        MyChart Information     [employer] gives you secure access to your electronic health record. If you see a primary care provider, you can also send messages to your care team and make appointments. If you have questions, please call your primary care clinic.  If you do not have a primary care provider, please call [phone] and they will assist you.        Care EveryWhere ID     This is your Care EveryWhere ID. This could be used by other organizations to access your [facility]  records  [id number]        Your Vitals Were     Pulse Temperature Respirations Pulse Oximetry BMI (Body Mass Index)       84 98.2  F (36.8  C) (Oral) 18 97% 24.3 kg/m2        Blood Pressure from Last 3 Encounters:   03/22/17 124/85   01/20/17 137/89   01/19/17 149/90    Weight from Last 3 Encounters:   03/22/17 139 lb 6 oz (63.2 kg)   02/08/17 138 lb (62.6 kg)   01/19/17 130 lb (59 kg)              Today, you had the following     No orders found for display         Today's Medication Changes          These changes are accurate as of: 3/22/17 11:16 AM.  If you have any questions, ask your nurse or doctor.               Start taking these medicines.        Dose/Directions    busPIRone 5 MG tablet   Commonly known as:  BUSPAR   Used for:  Anxiety        Start at 5 mg twice daily for 3 days, then 7.5 mg (1.5 tabs) twice daily for 3 days, then 10 mg (2 tabs) twice daily for 3 days, then 12.5 mg (2.5 tabs) twice daily for 3 days, then 15 mg (3 tabs) twice daily and stay at that dose   Quantity:  150 tablet   Refills:  0         These medicines have changed or have updated prescriptions.        Dose/Directions    clindamycin 150 MG capsule   Commonly known as:  CLEOCIN   This may have changed:    - when to take this  - reasons to take this  - additional instructions   Used for:  Chronic maxillary sinusitis, Chronic rhinitis, Nasal turbinate hypertrophy, Deviated nasal septum, Uncomplicated asthma, unspecified asthma severity        Open one capsule and place in 1 Litre of Normal Saline and mix. Irrigate with 20 cc each nostril QID.   Quantity:  10 capsule   Refills:  3            Where to get your medicines      Some of these will need a paper prescription and others can be bought over the counter.  Ask your nurse if you have questions.     Bring a paper prescription for each of these medications     busPIRone 5 MG tablet    zolpidem 6.25 MG CR tablet                Primary Care Provider Office Phone # Fax #    Janey  LEE Aguirre [phone]       [facility] [address] [place]  [facility] [address]        Thank you!     Thank you for choosing [facility]  for your care. Our goal is always to provide you with excellent care. Hearing back from our patients is one way we can continue to improve our services. Please take a few minutes to complete the written survey that you may receive in the mail after your visit with us. Thank you!             Your Updated Medication List - Protect others around you: Learn how to safely use, store and throw away your medicines at www.disposemymeds.org.          This list is accurate as of: 3/22/17 11:16 AM.  Always use your most recent med list.                   Brand Name Dispense Instructions for use    Beclomethasone Dipropionate 80 MCG/ACT Aers Nasal Spray    QNASL    8.7 g    Spray 2 sprays into both nostrils daily       busPIRone 5 MG tablet    BUSPAR    150 tablet    Start at 5 mg twice daily for 3 days, then 7.5 mg (1.5 tabs) twice daily for 3 days, then 10 mg (2 tabs) twice daily for 3 days, then 12.5 mg (2.5 tabs) twice daily for 3 days, then 15 mg (3 tabs) twice daily and stay at that dose       CALCIUM-MAGNESIUM-ZINC PO          clindamycin 150 MG capsule    CLEOCIN    10 capsule    Open one capsule and place in 1 Litre of Normal Saline and mix. Irrigate with 20 cc each nostril QID.       escitalopram 20 MG tablet    LEXAPRO    90 tablet    Take 1 tablet (20 mg) by mouth daily       loratadine 10 MG tablet    CLARITIN     as needed Reported on 3/22/2017       norethindrone-ethinyl estradiol 1-20 MG-MCG per tablet    JUNEL FE 1/20    84 tablet    Take 1 tablet by mouth daily       OMEGA-3 FISH OIL PO      Take 4 capsules by mouth daily       sodium chloride 0.9 % Soln     27416 mL    Irrigate 20 cc each nostril QID X 2 month supply       sodium chloride 0.9% (bottle) 0.9 % irrigation     1000 mL    IRRIGATE 20 ML EACH NOSTRIL 4 TIMES DAILY  FOR 2 MONTHS.       traZODone 50 MG tablet    DESYREL    180 tablet    Take 2 tablets (100 mg) by mouth nightly as needed for sleep       VITAMIN B 12 PO          VITAMIN D (CHOLECALCIFEROL) PO      Take by mouth daily       VITAMIN K PO          XIIDRA 5 % Soln   Generic drug:  Lifitegrast          zolpidem 6.25 MG CR tablet    AMBIEN CR    90 tablet    Take 1 tablet (6.25 mg) by mouth nightly as needed for sleep

## 2017-03-22 NOTE — LETTER
My Depression Action Plan  Name: Iliana Mcfarlane   Date of Birth 1965  Date: 3/22/2017    My doctor: Janey Aguirre   My clinic: [address] [address]  [phone]          GREEN    ZONE   Good Control    What it looks like:     Things are going generally well. You have normal up s and down s. You may even feel depressed from time to time, but bad moods usually last less than a day.   What you need to do:  1. Continue to care for yourself (see self care plan)  2. Check your depression survival kit and update it as needed  3. Follow your physician s recommendations including any medication.  4. Do not stop taking medication unless you consult with your physician first.           YELLOW         ZONE Getting Worse    What it looks like:     Depression is starting to interfere with your life.     It may be hard to get out of bed; you may be starting to isolate yourself from others.    Symptoms of depression are starting to last most all day and this has happened for several days.     You may have suicidal thoughts but they are not constant.   What you need to do:     1. Call your care team, your response to treatment will improve if you keep your care team informed of your progress. Yellow periods are signs an adjustment may need to be made.     2. Continue your self-care, even if you have to fake it!    3. Talk to someone in your support network    4. Open up your depression survival kit           RED    ZONE Medical Alert - Get Help    What it looks like:     Depression is seriously interfering with your life.     You may experience these or other symptoms: You can t get out of bed most days, can t work or engage in other necessary activities, you have trouble taking care of basic hygiene, or basic responsibilities, thoughts of suicide or death that will not go away, self-injurious behavior.     What you need to do:  1. Call your care team  and request a same-day appointment. If they are not available (weekends or after hours) call your local crisis line, emergency room or 911.      Electronically signed by: Netta Reyes, March 22, 2017    Depression Self Care Plan / Survival Kit    Self-Care for Depression  Here s the deal. Your body and mind are really not as separate as most people think.  What you do and think affects how you feel and how you feel influences what you do and think. This means if you do things that people who feel good do, it will help you feel better.  Sometimes this is all it takes.  There is also a place for medication and therapy depending on how severe your depression is, so be sure to consult with your medical provider and/ or Behavioral Health Consultant if your symptoms are worsening or not improving.     In order to better manage my stress, I will:    Exercise  Get some form of exercise, every day. This will help reduce pain and release endorphins, the  feel good  chemicals in your brain. This is almost as good as taking antidepressants!  This is not the same as joining a gym and then never going! (they count on that by the way ) It can be as simple as just going for a walk or doing some gardening, anything that will get you moving.      Hygiene   Maintain good hygiene (Get out of bed in the morning, Make your bed, Brush your teeth, Take a shower, and Get dressed like you were going to work, even if you are unemployed).  If your clothes don't fit try to get ones that do.    Diet  I will strive to eat foods that are good for me, drink plenty of water, and avoid excessive sugar, caffeine, alcohol, and other mood-altering substances.  Some foods that are helpful in depression are: complex carbohydrates, B vitamins, flaxseed, fish or fish oil, fresh fruits and vegetables.    Psychotherapy  I agree to participate in Individual Therapy (if recommended).    Medication  If prescribed medications, I agree to take them.  Missing doses  can result in serious side effects.  I understand that drinking alcohol, or other illicit drug use, may cause potential side effects.  I will not stop my medication abruptly without first discussing it with my provider.    Staying Connected With Others  I will stay in touch with my friends, family members, and my primary care provider/team.    Use your imagination  Be creative.  We all have a creative side; it doesn t matter if it s oil painting, sand castles, or mud pies! This will also kick up the endorphins.    Witness Beauty  (AKA stop and smell the roses) Take a look outside, even in mid-winter. Notice colors, textures. Watch the squirrels and birds.     Service to others  Be of service to others.  There is always someone else in need.  By helping others we can  get out of ourselves  and remember the really important things.  This also provides opportunities for practicing all the other parts of the program.    Humor  Laugh and be silly!  Adjust your TV habits for less news and crime-drama and more comedy.    Control your stress  Try breathing deep, massage therapy, biofeedback, and meditation. Find time to relax each day.     My support system    Clinic Contact:  Phone number:    Contact 1:  Phone number:    Contact 2:  Phone number:    [employer]ahaoism/:  Phone number:    Therapist:  Phone number:    Local crisis center:    Phone number:    Other community support:  Phone number:

## 2017-03-22 NOTE — NURSING NOTE
"Chief Complaint   Patient presents with     Recheck Medication       Initial /85  Pulse 84  Temp 98.2  F (36.8  C) (Oral)  Resp 18  Wt 139 lb 6 oz (63.2 kg)  SpO2 97%  BMI 24.3 kg/m2 Estimated body mass index is 24.3 kg/(m^2) as calculated from the following:    Height as of 2/8/17: 5' 3.5\" (1.613 m).    Weight as of this encounter: 139 lb 6 oz (63.2 kg).  Medication Reconciliation: complete     Netta Reyes MA      "

## 2017-03-22 NOTE — LETTER
My Depression Action Plan  Name: Iliana Mcfarlane   Date of Birth 1965  Date: 3/22/2017    My doctor: Janey Aguirre   My clinic: [address] [address]  [phone]          GREEN    ZONE   Good Control    What it looks like:     Things are going generally well. You have normal up s and down s. You may even feel depressed from time to time, but bad moods usually last less than a day.   What you need to do:  1. Continue to care for yourself (see self care plan)  2. Check your depression survival kit and update it as needed  3. Follow your physician s recommendations including any medication.  4. Do not stop taking medication unless you consult with your physician first.           YELLOW         ZONE Getting Worse    What it looks like:     Depression is starting to interfere with your life.     It may be hard to get out of bed; you may be starting to isolate yourself from others.    Symptoms of depression are starting to last most all day and this has happened for several days.     You may have suicidal thoughts but they are not constant.   What you need to do:     1. Call your care team, your response to treatment will improve if you keep your care team informed of your progress. Yellow periods are signs an adjustment may need to be made.     2. Continue your self-care, even if you have to fake it!    3. Talk to someone in your support network    4. Open up your depression survival kit           RED    ZONE Medical Alert - Get Help    What it looks like:     Depression is seriously interfering with your life.     You may experience these or other symptoms: You can t get out of bed most days, can t work or engage in other necessary activities, you have trouble taking care of basic hygiene, or basic responsibilities, thoughts of suicide or death that will not go away, self-injurious behavior.     What you need to do:  1. Call your care team  and request a same-day appointment. If they are not available (weekends or after hours) call your local crisis line, emergency room or 911.      Electronically signed by: Netta Reyes, March 22, 2017    Depression Self Care Plan / Survival Kit    Self-Care for Depression  Here s the deal. Your body and mind are really not as separate as most people think.  What you do and think affects how you feel and how you feel influences what you do and think. This means if you do things that people who feel good do, it will help you feel better.  Sometimes this is all it takes.  There is also a place for medication and therapy depending on how severe your depression is, so be sure to consult with your medical provider and/ or Behavioral Health Consultant if your symptoms are worsening or not improving.     In order to better manage my stress, I will:    Exercise  Get some form of exercise, every day. This will help reduce pain and release endorphins, the  feel good  chemicals in your brain. This is almost as good as taking antidepressants!  This is not the same as joining a gym and then never going! (they count on that by the way ) It can be as simple as just going for a walk or doing some gardening, anything that will get you moving.      Hygiene   Maintain good hygiene (Get out of bed in the morning, Make your bed, Brush your teeth, Take a shower, and Get dressed like you were going to work, even if you are unemployed).  If your clothes don't fit try to get ones that do.    Diet  I will strive to eat foods that are good for me, drink plenty of water, and avoid excessive sugar, caffeine, alcohol, and other mood-altering substances.  Some foods that are helpful in depression are: complex carbohydrates, B vitamins, flaxseed, fish or fish oil, fresh fruits and vegetables.    Psychotherapy  I agree to participate in Individual Therapy (if recommended).    Medication  If prescribed medications, I agree to take them.  Missing doses  can result in serious side effects.  I understand that drinking alcohol, or other illicit drug use, may cause potential side effects.  I will not stop my medication abruptly without first discussing it with my provider.    Staying Connected With Others  I will stay in touch with my friends, family members, and my primary care provider/team.    Use your imagination  Be creative.  We all have a creative side; it doesn t matter if it s oil painting, sand castles, or mud pies! This will also kick up the endorphins.    Witness Beauty  (AKA stop and smell the roses) Take a look outside, even in mid-winter. Notice colors, textures. Watch the squirrels and birds.     Service to others  Be of service to others.  There is always someone else in need.  By helping others we can  get out of ourselves  and remember the really important things.  This also provides opportunities for practicing all the other parts of the program.    Humor  Laugh and be silly!  Adjust your TV habits for less news and crime-drama and more comedy.    Control your stress  Try breathing deep, massage therapy, biofeedback, and meditation. Find time to relax each day.     My support system    Clinic Contact:  Phone number:    Contact 1:  Phone number:    Contact 2:  Phone number:    Christianity/:  Phone number:    Therapist:  Phone number:    Local crisis center:    Phone number:    Other community support:  Phone number:

## 2017-03-23 ASSESSMENT — ANXIETY QUESTIONNAIRES: GAD7 TOTAL SCORE: 8

## 2017-03-23 ASSESSMENT — PATIENT HEALTH QUESTIONNAIRE - PHQ9: SUM OF ALL RESPONSES TO PHQ QUESTIONS 1-9: 10

## 2017-04-11 ENCOUNTER — OFFICE VISIT (OUTPATIENT)
Dept: FAMILY MEDICINE | Facility: CLINIC | Age: 52
End: 2017-04-11
Payer: COMMERCIAL

## 2017-04-11 VITALS
SYSTOLIC BLOOD PRESSURE: 124 MMHG | HEART RATE: 85 BPM | OXYGEN SATURATION: 97 % | RESPIRATION RATE: 18 BRPM | DIASTOLIC BLOOD PRESSURE: 80 MMHG | TEMPERATURE: 98.2 F | BODY MASS INDEX: 24.61 KG/M2 | WEIGHT: 141.13 LBS

## 2017-04-11 DIAGNOSIS — F41.9 ANXIETY: Primary | ICD-10-CM

## 2017-04-11 DIAGNOSIS — Z13.220 SCREENING CHOLESTEROL LEVEL: ICD-10-CM

## 2017-04-11 LAB
CHOLEST SERPL-MCNC: 233 MG/DL
HDLC SERPL-MCNC: 84 MG/DL
LDLC SERPL CALC-MCNC: 131 MG/DL
NONHDLC SERPL-MCNC: 149 MG/DL
TRIGL SERPL-MCNC: 91 MG/DL

## 2017-04-11 PROCEDURE — 36415 COLL VENOUS BLD VENIPUNCTURE: CPT | Performed by: NURSE PRACTITIONER

## 2017-04-11 PROCEDURE — 80061 LIPID PANEL: CPT | Performed by: NURSE PRACTITIONER

## 2017-04-11 PROCEDURE — 99213 OFFICE O/P EST LOW 20 MIN: CPT | Performed by: NURSE PRACTITIONER

## 2017-04-11 RX ORDER — BUSPIRONE HYDROCHLORIDE 15 MG/1
15 TABLET ORAL 3 TIMES DAILY
Qty: 270 TABLET | Refills: 3 | Status: SHIPPED | OUTPATIENT
Start: 2017-04-11 | End: 2018-05-11

## 2017-04-11 NOTE — MR AVS SNAPSHOT
After Visit Summary   4/11/2017    Iliana Mcfarlane    MRN: [id number]           Patient Information     Date Of Birth          1965        Visit Information        Provider Department      4/11/2017 8:30 AM Janey Aguirre NP [facility]        Today's Diagnoses     Anxiety    -  1    Screening cholesterol level           Follow-ups after your visit        Who to contact     If you have questions or need follow up information about today's clinic visit or your schedule please contact [facility] directly at [phone].  Normal or non-critical lab and imaging results will be communicated to you by [employer], letter or phone within 4 business days after the clinic has received the results. If you do not hear from us within 7 days, please contact the clinic through [employer] or phone. If you have a critical or abnormal lab result, we will notify you by phone as soon as possible.  Submit refill requests through [employer] or call your pharmacy and they will forward the refill request to us. Please allow 3 business days for your refill to be completed.          Additional Information About Your Visit        MyChart Information     [employer] gives you secure access to your electronic health record. If you see a primary care provider, you can also send messages to your care team and make appointments. If you have questions, please call your primary care clinic.  If you do not have a primary care provider, please call [phone] and they will assist you.        Care EveryWhere ID     This is your Care EveryWhere ID. This could be used by other organizations to access your [place] medical records  [id number]        Your Vitals Were     Pulse Temperature Respirations Pulse Oximetry BMI (Body Mass Index)       85 98.2  F (36.8  C) (Oral) 18 97% 24.61 kg/m2        Blood Pressure from Last 3 Encounters:   04/11/17 124/80   03/22/17 124/85   01/20/17 137/89    Weight  from Last 3 Encounters:   04/11/17 141 lb 2 oz (64 kg)   03/22/17 139 lb 6 oz (63.2 kg)   02/08/17 138 lb (62.6 kg)              We Performed the Following     DEPRESSION ACTION PLAN (DAP)     Lipid Profile with reflex to direct LDL          Today's Medication Changes          These changes are accurate as of: 4/11/17  9:02 AM.  If you have any questions, ask your nurse or doctor.               These medicines have changed or have updated prescriptions.        Dose/Directions    busPIRone 15 MG tablet   Commonly known as:  BUSPAR   This may have changed:    - medication strength  - how much to take  - how to take this  - when to take this  - additional instructions   Used for:  Anxiety   Changed by:  Janey Aguirre NP        Dose:  15 mg   Take 1 tablet (15 mg) by mouth 3 times daily   Quantity:  270 tablet   Refills:  3       clindamycin 150 MG capsule   Commonly known as:  CLEOCIN   This may have changed:    - when to take this  - reasons to take this  - additional instructions   Used for:  Chronic maxillary sinusitis, Chronic rhinitis, Nasal turbinate hypertrophy, Deviated nasal septum, Uncomplicated asthma, unspecified asthma severity        Open one capsule and place in 1 Litre of Normal Saline and mix. Irrigate with 20 cc each nostril QID.   Quantity:  10 capsule   Refills:  3            Where to get your medicines      These medications were sent to [facility] - [address] [address]     Phone:  [phone]     busPIRone 15 MG tablet                Primary Care Provider Office Phone # Fax #    Janey Aguirre [phone]       [facility] [address] [facility]  [facility] [address]        Thank you!     Thank you for choosing [facility]  for your care. Our goal is always to provide you with excellent care. Hearing back from our patients is one way we can continue to improve our  services. Please take a few minutes to complete the written survey that you may receive in the mail after your visit with us. Thank you!             Your Updated Medication List - Protect others around you: Learn how to safely use, store and throw away your medicines at www.disposemymeds.org.          This list is accurate as of: 4/11/17  9:02 AM.  Always use your most recent med list.                   Brand Name Dispense Instructions for use    Beclomethasone Dipropionate 80 MCG/ACT Aers Nasal Spray    QNASL    8.7 g    Spray 2 sprays into both nostrils daily       busPIRone 15 MG tablet    BUSPAR    270 tablet    Take 1 tablet (15 mg) by mouth 3 times daily       CALCIUM-MAGNESIUM-ZINC PO          clindamycin 150 MG capsule    CLEOCIN    10 capsule    Open one capsule and place in 1 Litre of Normal Saline and mix. Irrigate with 20 cc each nostril QID.       escitalopram 20 MG tablet    LEXAPRO    90 tablet    Take 1 tablet (20 mg) by mouth daily       loratadine 10 MG tablet    CLARITIN     as needed Reported on 3/22/2017       norethindrone-ethinyl estradiol 1-20 MG-MCG per tablet    JUNEL FE 1/20    84 tablet    Take 1 tablet by mouth daily       OMEGA-3 FISH OIL PO      Take 4 capsules by mouth daily       sodium chloride 0.9 % Soln     03679 mL    Irrigate 20 cc each nostril QID X 2 month supply       sodium chloride 0.9% (bottle) 0.9 % irrigation     1000 mL    IRRIGATE 20 ML EACH NOSTRIL 4 TIMES DAILY FOR 2 MONTHS.       traZODone 50 MG tablet    DESYREL    180 tablet    Take 2 tablets (100 mg) by mouth nightly as needed for sleep       VITAMIN B 12 PO          VITAMIN D (CHOLECALCIFEROL) PO      Take by mouth daily       VITAMIN K PO          XIIDRA 5 % Soln   Generic drug:  [employer]          zolpidem 6.25 MG CR tablet    AMBIEN CR    90 tablet    Take 1 tablet (6.25 mg) by mouth nightly as needed for sleep

## 2017-04-11 NOTE — NURSING NOTE
"Chief Complaint   Patient presents with     Recheck Medication     LAB REQUEST     Cholesterol        Initial /90  Pulse 85  Temp 98.2  F (36.8  C) (Oral)  Resp 18  Wt 141 lb 2 oz (64 kg)  SpO2 97%  BMI 24.61 kg/m2 Estimated body mass index is 24.61 kg/(m^2) as calculated from the following:    Height as of 2/8/17: 5' 3.5\" (1.613 m).    Weight as of this encounter: 141 lb 2 oz (64 kg).  Medication Reconciliation: complete     Netta Reyes MA      "
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
## 2017-04-11 NOTE — LETTER
My Depression Action Plan  Name: Iliana Mcfarlane   Date of Birth 1965  Date: 4/11/2017    My doctor: Janey Aguirre   My clinic: [address] [address]  [phone]          GREEN    ZONE   Good Control    What it looks like:     Things are going generally well. You have normal up s and down s. You may even feel depressed from time to time, but bad moods usually last less than a day.   What you need to do:  1. Continue to care for yourself (see self care plan)  2. Check your depression survival kit and update it as needed  3. Follow your physician s recommendations including any medication.  4. Do not stop taking medication unless you consult with your physician first.           YELLOW         ZONE Getting Worse    What it looks like:     Depression is starting to interfere with your life.     It may be hard to get out of bed; you may be starting to isolate yourself from others.    Symptoms of depression are starting to last most all day and this has happened for several days.     You may have suicidal thoughts but they are not constant.   What you need to do:     1. Call your care team, your response to treatment will improve if you keep your care team informed of your progress. Yellow periods are signs an adjustment may need to be made.     2. Continue your self-care, even if you have to fake it!    3. Talk to someone in your support network    4. Open up your depression survival kit           RED    ZONE Medical Alert - Get Help    What it looks like:     Depression is seriously interfering with your life.     You may experience these or other symptoms: You can t get out of bed most days, can t work or engage in other necessary activities, you have trouble taking care of basic hygiene, or basic responsibilities, thoughts of suicide or death that will not go away, self-injurious behavior.     What you need to do:  1. Call your care team  and request a same-day appointment. If they are not available (weekends or after hours) call your local crisis line, emergency room or 911.      Electronically signed by: Netta Reyes, April 11, 2017    Depression Self Care Plan / Survival Kit    Self-Care for Depression  Here s the deal. Your body and mind are really not as separate as most people think.  What you do and think affects how you feel and how you feel influences what you do and think. This means if you do things that people who feel good do, it will help you feel better.  Sometimes this is all it takes.  There is also a place for medication and therapy depending on how severe your depression is, so be sure to consult with your medical provider and/ or Behavioral Health Consultant if your symptoms are worsening or not improving.     In order to better manage my stress, I will:    Exercise  Get some form of exercise, every day. This will help reduce pain and release endorphins, the  feel good  chemicals in your brain. This is almost as good as taking antidepressants!  This is not the same as joining a gym and then never going! (they count on that by the way ) It can be as simple as just going for a walk or doing some gardening, anything that will get you moving.      Hygiene   Maintain good hygiene (Get out of bed in the morning, Make your bed, Brush your teeth, Take a shower, and Get dressed like you were going to work, even if you are unemployed).  If your clothes don't fit try to get ones that do.    Diet  I will strive to eat foods that are good for me, drink plenty of water, and avoid excessive sugar, caffeine, alcohol, and other mood-altering substances.  Some foods that are helpful in depression are: complex carbohydrates, B vitamins, flaxseed, fish or fish oil, fresh fruits and vegetables.    Psychotherapy  I agree to participate in Individual Therapy (if recommended).    Medication  If prescribed medications, I agree to take them.  Missing doses  can result in serious side effects.  I understand that drinking alcohol, or other illicit drug use, may cause potential side effects.  I will not stop my medication abruptly without first discussing it with my provider.    Staying Connected With Others  I will stay in touch with my friends, family members, and my primary care provider/team.    Use your imagination  Be creative.  We all have a creative side; it doesn t matter if it s oil painting, sand castles, or mud pies! This will also kick up the endorphins.    Witness Beauty  (AKA stop and smell the roses) Take a look outside, even in mid-winter. Notice colors, textures. Watch the squirrels and birds.     Service to others  Be of service to others.  There is always someone else in need.  By helping others we can  get out of ourselves  and remember the really important things.  This also provides opportunities for practicing all the other parts of the program.    Humor  Laugh and be silly!  Adjust your TV habits for less news and crime-drama and more comedy.    Control your stress  Try breathing deep, massage therapy, biofeedback, and meditation. Find time to relax each day.     My support system    Clinic Contact:  Phone number:    Contact 1:  Phone number:    Contact 2:  Phone number:    Jehovah's [employer]/:  Phone number:    Therapist:  Phone number:    Local crisis center:    Phone number:    Other community support:  Phone number:

## 2017-04-28 DIAGNOSIS — Z12.11 SPECIAL SCREENING FOR MALIGNANT NEOPLASMS, COLON: ICD-10-CM

## 2017-04-28 PROCEDURE — 82274 ASSAY TEST FOR BLOOD FECAL: CPT | Performed by: NURSE PRACTITIONER

## 2017-05-02 LAB — HEMOCCULT STL QL IA: NEGATIVE

## 2017-05-24 ENCOUNTER — TELEPHONE (OUTPATIENT)
Dept: FAMILY MEDICINE | Facility: CLINIC | Age: 52
End: 2017-05-24

## 2017-06-20 NOTE — TELEPHONE ENCOUNTER
Call not required; FIT test completed with neg results in 04/2017    Outreach ,  Gwendolyn Monte

## 2017-07-19 ENCOUNTER — OFFICE VISIT (OUTPATIENT)
Dept: FAMILY MEDICINE | Facility: CLINIC | Age: 52
End: 2017-07-19
Payer: COMMERCIAL

## 2017-07-19 VITALS
TEMPERATURE: 98 F | OXYGEN SATURATION: 97 % | BODY MASS INDEX: 25.16 KG/M2 | HEIGHT: 64 IN | SYSTOLIC BLOOD PRESSURE: 134 MMHG | WEIGHT: 147.38 LBS | HEART RATE: 87 BPM | DIASTOLIC BLOOD PRESSURE: 87 MMHG | RESPIRATION RATE: 18 BRPM

## 2017-07-19 DIAGNOSIS — Z00.00 ROUTINE GENERAL MEDICAL EXAMINATION AT A HEALTH CARE FACILITY: Primary | ICD-10-CM

## 2017-07-19 DIAGNOSIS — R03.0 ELEVATED BLOOD PRESSURE READING WITHOUT DIAGNOSIS OF HYPERTENSION: ICD-10-CM

## 2017-07-19 DIAGNOSIS — F41.9 ANXIETY: ICD-10-CM

## 2017-07-19 DIAGNOSIS — F51.01 PRIMARY INSOMNIA: ICD-10-CM

## 2017-07-19 DIAGNOSIS — J30.89 ALLERGIC RHINITIS CAUSED BY MOLD: ICD-10-CM

## 2017-07-19 DIAGNOSIS — Z30.41 ENCOUNTER FOR SURVEILLANCE OF CONTRACEPTIVE PILLS: ICD-10-CM

## 2017-07-19 DIAGNOSIS — N94.9 FEMALE GENITAL SYMPTOMS: ICD-10-CM

## 2017-07-19 LAB
MICRO REPORT STATUS: NORMAL
SPECIMEN SOURCE: NORMAL
WET PREP SPEC: NORMAL

## 2017-07-19 PROCEDURE — 87210 SMEAR WET MOUNT SALINE/INK: CPT | Performed by: NURSE PRACTITIONER

## 2017-07-19 PROCEDURE — G0145 SCR C/V CYTO,THINLAYER,RESCR: HCPCS | Performed by: NURSE PRACTITIONER

## 2017-07-19 PROCEDURE — 87624 HPV HI-RISK TYP POOLED RSLT: CPT | Performed by: NURSE PRACTITIONER

## 2017-07-19 PROCEDURE — 99396 PREV VISIT EST AGE 40-64: CPT | Performed by: NURSE PRACTITIONER

## 2017-07-19 RX ORDER — ZOLPIDEM TARTRATE 6.25 MG/1
6.25 TABLET, FILM COATED, EXTENDED RELEASE ORAL
Qty: 90 TABLET | Refills: 1 | Status: SHIPPED | OUTPATIENT
Start: 2017-07-19 | End: 2018-03-23

## 2017-07-19 RX ORDER — NORETHINDRONE ACETATE AND ETHINYL ESTRADIOL 1MG-20(21)
1 KIT ORAL DAILY
Qty: 84 TABLET | Status: SHIPPED | OUTPATIENT
Start: 2017-07-19 | End: 2018-02-22

## 2017-07-19 RX ORDER — ESCITALOPRAM OXALATE 20 MG/1
20 TABLET ORAL DAILY
Qty: 90 TABLET | Status: SHIPPED | OUTPATIENT
Start: 2017-07-19 | End: 2018-08-05

## 2017-07-19 RX ORDER — TRAZODONE HYDROCHLORIDE 50 MG/1
100 TABLET, FILM COATED ORAL
Qty: 180 TABLET | Status: SHIPPED | OUTPATIENT
Start: 2017-07-19 | End: 2019-11-13

## 2017-07-19 NOTE — LETTER
August 2, 2017    Iliana Mcfarlane  [address]  [facility] [address]    Dear Iliana,  We are happy to inform you that your PAP smear result from 07/19/17 is normal.  We are now able to do a follow up test on PAP smears. The DNA test is for HPV (Human Papilloma Virus). Cervical cancer is closely linked with certain types of HPV. Your result showed no evidence of high risk HPV.  Therefore we recommend you return in 5 years for your next pap smear and HPV test.  You will still need to return to the clinic every year for an annual exam and other preventive tests.  Please contact the clinic at [phone] with any questions.  Sincerely,    Janey Aguirre NP/miguel
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
## 2017-07-19 NOTE — NURSING NOTE
"Chief Complaint   Patient presents with     Physical       Initial BP (!) 142/96  Pulse 87  Temp 98  F (36.7  C) (Oral)  Resp 18  Ht 5' 3.5\" (1.613 m)  Wt 147 lb 6 oz (66.8 kg)  SpO2 97%  BMI 25.7 kg/m2 Estimated body mass index is 25.7 kg/(m^2) as calculated from the following:    Height as of this encounter: 5' 3.5\" (1.613 m).    Weight as of this encounter: 147 lb 6 oz (66.8 kg).  Medication Reconciliation: complete     Netta Reyes MA      "

## 2017-07-19 NOTE — MR AVS SNAPSHOT
After Visit Summary   7/19/2017    Iliana Mcfarlane    MRN: [id number]           Patient Information     Date Of Birth          1965        Visit Information        Provider Department      7/19/2017 5:45 PM Janey Aguirre NP [facility]        Today's Diagnoses     Routine general medical examination at a health care facility    -  1    Anxiety        Encounter for surveillance of contraceptive pills        Primary insomnia        Allergic rhinitis caused by mold        Female genital symptoms          Care Instructions    Mammogram [phone]      Preventive Health Recommendations  Female Ages 50 - 64    Yearly exam: See your health care provider every year in order to  o Review health changes.   o Discuss preventive care.    o Review your medicines if your doctor has prescribed any.      Get a Pap test every three years (unless you have an abnormal result and your provider advises testing more often).    If you get Pap tests with HPV test, you only need to test every 5 years, unless you have an abnormal result.     You do not need a Pap test if your uterus was removed (hysterectomy) and you have not had cancer.    You should be tested each year for STDs (sexually transmitted diseases) if you're at risk.     Have a mammogram every 1 to 2 years.    Have a colonoscopy at age 50, or have a yearly FIT test (stool test). These exams screen for colon cancer.      Have a cholesterol test every 5 years, or more often if advised.    Have a diabetes test (fasting glucose) every three years. If you are at risk for diabetes, you should have this test more often.     If you are at risk for osteoporosis (brittle bone disease), think about having a bone density scan (DEXA).    Shots: Get a flu shot each year. Get a tetanus shot every 10 years.    Nutrition:     Eat at least 5 servings of fruits and vegetables each day.    Eat whole-grain bread, whole-wheat pasta and brown rice  instead of white grains and rice.    Talk to your provider about Calcium and Vitamin D.     Lifestyle    Exercise at least 150 minutes a week (30 minutes a day, 5 days a week). This will help you control your weight and prevent disease.    Limit alcohol to one drink per day.    No smoking.     Wear sunscreen to prevent skin cancer.     See your dentist every six months for an exam and cleaning.    See your eye doctor every 1 to 2 years.    Preventive Health Recommendations  Female Ages 50 - 64    Yearly exam: See your health care provider every year in order to  o Review health changes.   o Discuss preventive care.    o Review your medicines if your doctor has prescribed any.      Get a Pap test every three years (unless you have an abnormal result and your provider advises testing more often).    If you get Pap tests with HPV test, you only need to test every 5 years, unless you have an abnormal result.     You do not need a Pap test if your uterus was removed (hysterectomy) and you have not had cancer.    You should be tested each year for STDs (sexually transmitted diseases) if you're at risk.     Have a mammogram every 1 to 2 years.    Have a colonoscopy at age 50, or have a yearly FIT test (stool test). These exams screen for colon cancer.      Have a cholesterol test every 5 years, or more often if advised.    Have a diabetes test (fasting glucose) every three years. If you are at risk for diabetes, you should have this test more often.     If you are at risk for osteoporosis (brittle bone disease), think about having a bone density scan (DEXA).    Shots: Get a flu shot each year. Get a tetanus shot every 10 years.    Nutrition:     Eat at least 5 servings of fruits and vegetables each day.    Eat whole-grain bread, whole-wheat pasta and brown rice instead of white grains and rice.    Talk to your provider about Calcium and Vitamin D.     Lifestyle    Exercise at least 150 minutes a week (30 minutes a day, 5  "days a week). This will help you control your weight and prevent disease.    Limit alcohol to one drink per day.    No smoking.     Wear sunscreen to prevent skin cancer.     See your dentist every six months for an exam and cleaning.    See your eye doctor every 1 to 2 years.            Follow-ups after your visit        Who to contact     If you have questions or need follow up information about today's clinic visit or your schedule please contact [facility] directly at [phone].  Normal or non-critical lab and imaging results will be communicated to you by Qomutyhart, letter or phone within 4 business days after the clinic has received the results. If you do not hear from us within 7 days, please contact the clinic through Avesot or phone. If you have a critical or abnormal lab result, we will notify you by phone as soon as possible.  Submit refill requests through [employer] or call your pharmacy and they will forward the refill request to us. Please allow 3 business days for your refill to be completed.          Additional Information About Your Visit        QomutyharAdsIt Information     [employer] gives you secure access to your electronic health record. If you see a primary care provider, you can also send messages to your care team and make appointments. If you have questions, please call your primary care clinic.  If you do not have a primary care provider, please call [phone] and they will assist you.        Care EveryWhere ID     This is your Care EveryWhere ID. This could be used by other organizations to access your [place] medical records  [id number]        Your Vitals Were     Pulse Temperature Respirations Height Pulse Oximetry BMI (Body Mass Index)    87 98  F (36.7  C) (Oral) 18 5' 3.5\" (1.613 m) 97% 25.7 kg/m2       Blood Pressure from Last 3 Encounters:   07/19/17 (!) 142/96   04/11/17 124/80   03/22/17 124/85    Weight from Last 3 Encounters:   07/19/17 147 lb 6 oz (66.8 kg) "   04/11/17 141 lb 2 oz (64 kg)   03/22/17 139 lb 6 oz (63.2 kg)              We Performed the Following     HPV High Risk Types DNA Cervical     Pap imaged thin layer screen with HPV - recommended age 30 - 65 years (select HPV order below)     Wet prep          Today's Medication Changes          These changes are accurate as of: 7/19/17  6:41 PM.  If you have any questions, ask your nurse or doctor.               These medicines have changed or have updated prescriptions.        Dose/Directions    clindamycin 150 MG capsule   Commonly known as:  CLEOCIN   This may have changed:    - when to take this  - reasons to take this  - additional instructions   Used for:  Chronic maxillary sinusitis, Chronic rhinitis, Nasal turbinate hypertrophy, Deviated nasal septum, Uncomplicated asthma, unspecified asthma severity        Open one capsule and place in 1 Litre of Normal Saline and mix. Irrigate with 20 cc each nostril QID.   Quantity:  10 capsule   Refills:  3            Where to get your medicines      These medications were sent to [facility] - [address] [address]     Phone:  [phone]     Beclomethasone Dipropionate 80 MCG/ACT Aers Nasal Spray    escitalopram 20 MG tablet    norethindrone-ethinyl estradiol 1-20 MG-MCG per tablet    traZODone 50 MG tablet         Some of these will need a paper prescription and others can be bought over the counter.  Ask your nurse if you have questions.     Bring a paper prescription for each of these medications     zolpidem 6.25 MG CR tablet                Primary Care Provider Office Phone # Fax #    Janey Aguirre [phone]       [facility] [address] [facility] [address]        Equal Access to Services     JOHN GONZALEZ AH: Harvey Barron, rosette mckeon, jovany aburto. So [facility]  [phone].    ATENCIÓN: Si forrest panda, tiene a case disposición servicios gratuitos de asistencia lingüística. Vargas powell [phone].    We comply with applicable federal civil rights laws and [place] laws. We do not discriminate on the basis of race, color, national origin, age, disability sex, sexual orientation or gender identity.            Thank you!     Thank you for choosing [facility]  for your care. Our goal is always to provide you with excellent care. Hearing back from our patients is one way we can continue to improve our services. Please take a few minutes to complete the written survey that you may receive in the mail after your visit with us. Thank you!             Your Updated Medication List - Protect others around you: Learn how to safely use, store and throw away your medicines at www.disposemymeds.org.          This list is accurate as of: 7/19/17  6:41 PM.  Always use your most recent med list.                   Brand Name Dispense Instructions for use Diagnosis    Beclomethasone Dipropionate 80 MCG/ACT Aers Nasal Spray    QNASL    8.7 g    Spray 2 sprays into both nostrils daily    Allergic rhinitis caused by mold       busPIRone 15 MG tablet    BUSPAR    270 tablet    Take 1 tablet (15 mg) by mouth 3 times daily    Anxiety       CALCIUM-MAGNESIUM-ZINC PO           clindamycin 150 MG capsule    CLEOCIN    10 capsule    Open one capsule and place in 1 Litre of Normal Saline and mix. Irrigate with 20 cc each nostril QID.    Chronic maxillary sinusitis, Chronic rhinitis, Nasal turbinate hypertrophy, Deviated nasal septum, Uncomplicated asthma, unspecified asthma severity       escitalopram 20 MG tablet    LEXAPRO    90 tablet    Take 1 tablet (20 mg) by mouth daily    Anxiety       loratadine 10 MG tablet    CLARITIN     as needed Reported on 3/22/2017        norethindrone-ethinyl estradiol 1-20 MG-MCG per tablet    JUNEL FE 1/20    84 tablet    Take 1 tablet by mouth  daily    Encounter for surveillance of contraceptive pills       OMEGA-3 FISH OIL PO      Take 4 capsules by mouth daily        sodium chloride 0.9 % Soln     76131 mL    Irrigate 20 cc each nostril QID X 2 month supply    Chronic maxillary sinusitis, Chronic rhinitis, Nasal turbinate hypertrophy, Deviated nasal septum, Uncomplicated asthma, unspecified asthma severity       sodium chloride 0.9% (bottle) 0.9 % irrigation     1000 mL    IRRIGATE 20 ML EACH NOSTRIL 4 TIMES DAILY FOR 2 MONTHS.    Chronic maxillary sinusitis       traZODone 50 MG tablet    DESYREL    180 tablet    Take 2 tablets (100 mg) by mouth nightly as needed for sleep    Primary insomnia       VITAMIN B 12 PO           VITAMIN D (CHOLECALCIFEROL) PO      Take by mouth daily        VITAMIN K PO           XIIDRA 5 % Soln   Generic drug:  Lifitegrast           zolpidem 6.25 MG CR tablet    AMBIEN CR    90 tablet    Take 1 tablet (6.25 mg) by mouth nightly as needed for sleep    Primary insomnia

## 2017-07-19 NOTE — PATIENT INSTRUCTIONS
Mammogram [phone]      Preventive Health Recommendations  Female Ages 50 - 64    Yearly exam: See your health care provider every year in order to  o Review health changes.   o Discuss preventive care.    o Review your medicines if your doctor has prescribed any.      Get a Pap test every three years (unless you have an abnormal result and your provider advises testing more often).    If you get Pap tests with HPV test, you only need to test every 5 years, unless you have an abnormal result.     You do not need a Pap test if your uterus was removed (hysterectomy) and you have not had cancer.    You should be tested each year for STDs (sexually transmitted diseases) if you're at risk.     Have a mammogram every 1 to 2 years.    Have a colonoscopy at age 50, or have a yearly FIT test (stool test). These exams screen for colon cancer.      Have a cholesterol test every 5 years, or more often if advised.    Have a diabetes test (fasting glucose) every three years. If you are at risk for diabetes, you should have this test more often.     If you are at risk for osteoporosis (brittle bone disease), think about having a bone density scan (DEXA).    Shots: Get a flu shot each year. Get a tetanus shot every 10 years.    Nutrition:     Eat at least 5 servings of fruits and vegetables each day.    Eat whole-grain bread, whole-wheat pasta and brown rice instead of white grains and rice.    Talk to your provider about Calcium and Vitamin D.     Lifestyle    Exercise at least 150 minutes a week (30 minutes a day, 5 days a week). This will help you control your weight and prevent disease.    Limit alcohol to one drink per day.    No smoking.     Wear sunscreen to prevent skin cancer.     See your dentist every six months for an exam and cleaning.    See your eye doctor every 1 to 2 years.    Preventive Health Recommendations  Female Ages 50 - 64    Yearly exam: See your health care provider every year in order to  o Review  health changes.   o Discuss preventive care.    o Review your medicines if your doctor has prescribed any.      Get a Pap test every three years (unless you have an abnormal result and your provider advises testing more often).    If you get Pap tests with HPV test, you only need to test every 5 years, unless you have an abnormal result.     You do not need a Pap test if your uterus was removed (hysterectomy) and you have not had cancer.    You should be tested each year for STDs (sexually transmitted diseases) if you're at risk.     Have a mammogram every 1 to 2 years.    Have a colonoscopy at age 50, or have a yearly FIT test (stool test). These exams screen for colon cancer.      Have a cholesterol test every 5 years, or more often if advised.    Have a diabetes test (fasting glucose) every three years. If you are at risk for diabetes, you should have this test more often.     If you are at risk for osteoporosis (brittle bone disease), think about having a bone density scan (DEXA).    Shots: Get a flu shot each year. Get a tetanus shot every 10 years.    Nutrition:     Eat at least 5 servings of fruits and vegetables each day.    Eat whole-grain bread, whole-wheat pasta and brown rice instead of white grains and rice.    Talk to your provider about Calcium and Vitamin D.     Lifestyle    Exercise at least 150 minutes a week (30 minutes a day, 5 days a week). This will help you control your weight and prevent disease.    Limit alcohol to one drink per day.    No smoking.     Wear sunscreen to prevent skin cancer.     See your dentist every six months for an exam and cleaning.    See your eye doctor every 1 to 2 years.

## 2017-07-25 LAB
COPATH REPORT: NORMAL
PAP: NORMAL

## 2017-07-27 LAB
FINAL DIAGNOSIS: NORMAL
HPV HR 12 DNA CVX QL NAA+PROBE: NEGATIVE
HPV16 DNA SPEC QL NAA+PROBE: NEGATIVE
HPV18 DNA SPEC QL NAA+PROBE: NEGATIVE
SPECIMEN DESCRIPTION: NORMAL

## 2017-11-15 ENCOUNTER — OFFICE VISIT (OUTPATIENT)
Dept: FAMILY MEDICINE | Facility: CLINIC | Age: 52
End: 2017-11-15
Payer: COMMERCIAL

## 2017-11-15 ENCOUNTER — RADIANT APPOINTMENT (OUTPATIENT)
Dept: GENERAL RADIOLOGY | Facility: CLINIC | Age: 52
End: 2017-11-15
Attending: NURSE PRACTITIONER
Payer: COMMERCIAL

## 2017-11-15 VITALS
DIASTOLIC BLOOD PRESSURE: 87 MMHG | TEMPERATURE: 98.7 F | BODY MASS INDEX: 25.95 KG/M2 | SYSTOLIC BLOOD PRESSURE: 117 MMHG | HEART RATE: 87 BPM | OXYGEN SATURATION: 98 % | RESPIRATION RATE: 20 BRPM | HEIGHT: 64 IN | WEIGHT: 152 LBS

## 2017-11-15 DIAGNOSIS — N20.0 CALCULUS OF KIDNEY: ICD-10-CM

## 2017-11-15 DIAGNOSIS — R10.9 RIGHT FLANK PAIN: Primary | ICD-10-CM

## 2017-11-15 DIAGNOSIS — R10.9 RIGHT FLANK PAIN: ICD-10-CM

## 2017-11-15 LAB
ALBUMIN UR-MCNC: 100 MG/DL
APPEARANCE UR: CLEAR
BACTERIA #/AREA URNS HPF: ABNORMAL /HPF
BILIRUB UR QL STRIP: NEGATIVE
COLOR UR AUTO: YELLOW
GLUCOSE UR STRIP-MCNC: NEGATIVE MG/DL
HGB UR QL STRIP: ABNORMAL
KETONES UR STRIP-MCNC: NEGATIVE MG/DL
LEUKOCYTE ESTERASE UR QL STRIP: NEGATIVE
MUCOUS THREADS #/AREA URNS LPF: PRESENT /LPF
NITRATE UR QL: NEGATIVE
NON-SQ EPI CELLS #/AREA URNS LPF: ABNORMAL /LPF
PH UR STRIP: 6.5 PH (ref 5–7)
RBC #/AREA URNS AUTO: ABNORMAL /HPF
SOURCE: ABNORMAL
SP GR UR STRIP: 1.02 (ref 1–1.03)
UROBILINOGEN UR STRIP-ACNC: 0.2 EU/DL (ref 0.2–1)
WBC #/AREA URNS AUTO: ABNORMAL /HPF

## 2017-11-15 PROCEDURE — 81001 URINALYSIS AUTO W/SCOPE: CPT | Performed by: NURSE PRACTITIONER

## 2017-11-15 PROCEDURE — 99214 OFFICE O/P EST MOD 30 MIN: CPT | Performed by: NURSE PRACTITIONER

## 2017-11-15 PROCEDURE — 74010 XR KUB: CPT | Mod: 52

## 2017-11-15 RX ORDER — ONDANSETRON 4 MG/1
4-8 TABLET, ORALLY DISINTEGRATING ORAL
Qty: 20 TABLET | Refills: 1 | Status: SHIPPED | OUTPATIENT
Start: 2017-11-15 | End: 2018-07-12

## 2017-11-15 RX ORDER — OXYCODONE AND ACETAMINOPHEN 5; 325 MG/1; MG/1
1 TABLET ORAL EVERY 4 HOURS PRN
Qty: 18 TABLET | Refills: 0 | Status: SHIPPED | OUTPATIENT
Start: 2017-11-15 | End: 2017-12-08

## 2017-11-15 RX ORDER — TAMSULOSIN HYDROCHLORIDE 0.4 MG/1
0.4 CAPSULE ORAL DAILY
Qty: 30 CAPSULE | Refills: 0 | Status: SHIPPED | OUTPATIENT
Start: 2017-11-15 | End: 2018-07-11

## 2017-11-15 ASSESSMENT — PATIENT HEALTH QUESTIONNAIRE - PHQ9: SUM OF ALL RESPONSES TO PHQ QUESTIONS 1-9: 15

## 2017-11-15 NOTE — PATIENT INSTRUCTIONS
For today, I am treating you for a kidney stone.  Push fluids.  Rest.    Medications:  Nausea: Odensatron is for nausea. You can take this as needed for nausea.  Pain control: you can take ibuprofen or tylenol intermittently for pain. You can take the narcotic, percocet, as prescribed/needed for severe pain. Do not take extra tylenol with the percocet.  Flomax/tamsulosin: this helps kidney flow. Take daily as prescribed for 2-4 weeks. If you pass the stone and your pain subsides, you can stop this medication.    If your pain worsens in any way, go to the ER.    If you collect a stone, please bring it back to the lab for analysis.    If your pain persists (without significant worsening) for the next 4-7 days, please let me now and I would order a CT scan for abdominal evaluation.              Kidney Stone (Urine)  Does this test have other names?  Urine stone risk profile, 24-hour collection  What is this test?  This test checks your urine for chemicals that might cause your body to form kidney stones. The test also looks for blood in your urine, which can be a symptom of kidney stones.  Kidney stones are hard masses of minerals and salts that can form in your kidneys. They can be as small as a grain of sand or more than an inch in diameter. Usually theses stones or crystals pass through your body when you urinate. But sometimes they can get stuck in your urinary tract and cause a lot of pain.  Why do I need this test?  You may need this test if your healthcare provider suspects that you have kidney stones. Symptoms of kidney stones include:    Pain in your lower belly (abdomen) or side    Nausea and vomiting    Sudden, strong urge to urinate    Pain when urinating    Blood in your urine  You may also have this test if you had a kidney stone or you are being treated for kidney stones. If you have had a kidney stone or any treatments for a kidney stone, you should wait 1 to 2 months, or until you have completely  recovered, before having this test.  You will need to repeat the test at least twice so your healthcare provider can compare the results.  What other tests might I have along with this test?  Your healthcare provider may also order imaging tests. These include an ultrasound, CT scan and, a special type of X-ray (pyelogram) that uses a dye to look for kidney stones.  Your provider is also likely to order blood tests, to look for calcium, phosphate, uric acid, oxalate, and citrate. These are some of the chemicals that are most likely to cause your body to form kidney stones.  What do my test results mean?  Many things may affect your lab test results. These include the method each lab uses to do the test. Even if your test results are different from the normal value, you may not have a problem. To learn what the results mean for you, talk with your healthcare provider.  The results will show whether your urine has high or low levels of the chemicals that are most likely to cause stones to form. These chemicals are calcium, phosphate, uric acid, oxalate, and citrate.  If your levels are not normal, it may mean that you have a kidney stone or stones.  Abnormal levels may also mean that you have another kidney disorder, such as a urinary tract infection.  How is this test done?  This test requires a 24-hour urine sample. For this sample, you must collect all of your urine for 24 hours. Empty your bladder completely first in the morning without collecting it and note the time. Then collect your urine every time you go to the bathroom over the next 24 hours. As you collect the urine, store it in the refrigerator.  Does this test pose any risks?  This test does not pose any known risks.  What might affect my test results?  Having this test too soon after treatment for a previous kidney stone can affect your results. You should wait several months after treatment before having this test.  How do I get ready for this test?   You don't need to prepare for this test.      0934-8022 [employer] [address] [facility], [place] [address]. All rights reserved. This information is not intended as a substitute for professional medical care. Always follow your healthcare professional's instructions.

## 2017-11-15 NOTE — MR AVS SNAPSHOT
After Visit Summary   11/15/2017    Iliana Mcfarlane    MRN: [id number]           Patient Information     Date Of Birth          1965        Visit Information        Provider Department      11/15/2017 11:20 AM Nisreen Tabares APRN [facility]        Today's Diagnoses     Right flank pain    -  1    Calculus of kidney          Care Instructions    For today, I am treating you for a kidney stone.  Push fluids.  Rest.    Medications:  Nausea: Odensatron is for nausea. You can take this as needed for nausea.  Pain control: you can take ibuprofen or tylenol intermittently for pain. You can take the narcotic, percocet, as prescribed/needed for severe pain. Do not take extra tylenol with the percocet.  Flomax/tamsulosin: this helps kidney flow. Take daily as prescribed for 2-4 weeks. If you pass the stone and your pain subsides, you can stop this medication.    If your pain worsens in any way, go to the ER.    If you collect a stone, please bring it back to the lab for analysis.    If your pain persists (without significant worsening) for the next 4-7 days, please let me now and I would order a CT scan for abdominal evaluation.              Kidney Stone (Urine)  Does this test have other names?  Urine stone risk profile, 24-hour collection  What is this test?  This test checks your urine for chemicals that might cause your body to form kidney stones. The test also looks for blood in your urine, which can be a symptom of kidney stones.  Kidney stones are hard masses of minerals and salts that can form in your kidneys. They can be as small as a grain of sand or more than an inch in diameter. Usually theses stones or crystals pass through your body when you urinate. But sometimes they can get stuck in your urinary tract and cause a lot of pain.  Why do I need this test?  You may need this test if your healthcare provider suspects that you have kidney stones. Symptoms of  kidney stones include:    Pain in your lower belly (abdomen) or side    Nausea and vomiting    Sudden, strong urge to urinate    Pain when urinating    Blood in your urine  You may also have this test if you had a kidney stone or you are being treated for kidney stones. If you have had a kidney stone or any treatments for a kidney stone, you should wait 1 to 2 months, or until you have completely recovered, before having this test.  You will need to repeat the test at least twice so your healthcare provider can compare the results.  What other tests might I have along with this test?  Your healthcare provider may also order imaging tests. These include an ultrasound, CT scan and, a special type of X-ray (pyelogram) that uses a dye to look for kidney stones.  Your provider is also likely to order blood tests, to look for calcium, phosphate, uric acid, oxalate, and citrate. These are some of the chemicals that are most likely to cause your body to form kidney stones.  What do my test results mean?  Many things may affect your lab test results. These include the method each lab uses to do the test. Even if your test results are different from the normal value, you may not have a problem. To learn what the results mean for you, talk with your healthcare provider.  The results will show whether your urine has high or low levels of the chemicals that are most likely to cause stones to form. These chemicals are calcium, phosphate, uric acid, oxalate, and citrate.  If your levels are not normal, it may mean that you have a kidney stone or stones.  Abnormal levels may also mean that you have another kidney disorder, such as a urinary tract infection.  How is this test done?  This test requires a 24-hour urine sample. For this sample, you must collect all of your urine for 24 hours. Empty your bladder completely first in the morning without collecting it and note the time. Then collect your urine every time you go to the  bathroom over the next 24 hours. As you collect the urine, store it in the refrigerator.  Does this test pose any risks?  This test does not pose any known risks.  What might affect my test results?  Having this test too soon after treatment for a previous kidney stone can affect your results. You should wait several months after treatment before having this test.  How do I get ready for this test?  You don't need to prepare for this test.      2163-0275 [employer]. [address] [place] [address]. All rights reserved. This information is not intended as a substitute for professional medical care. Always follow your healthcare professional's instructions.                Follow-ups after your visit        Future tests that were ordered for you today     Open Future Orders        Priority Expected Expires Ordered    Stone analysis Routine  11/15/2018 11/15/2017            Who to contact     If you have questions or need follow up information about today's clinic visit or your schedule please contact [facility] directly at [phone].  Normal or non-critical lab and imaging results will be communicated to you by [employer], letter or phone within 4 business days after the clinic has received the results. If you do not hear from us within 7 days, please contact the clinic through [employer] or phone. If you have a critical or abnormal lab result, we will notify you by phone as soon as possible.  Submit refill requests through [employer] or call your pharmacy and they will forward the refill request to us. Please allow 3 business days for your refill to be completed.          Additional Information About Your Visit        [employer] Information     [employer] gives you secure access to your electronic health record. If you see a primary care provider, you can also send messages to your care team and make appointments. If you have questions, please call your primary care clinic.  If you do  "not have a primary care provider, please call [phone] and they will assist you.        Care EveryWhere ID     This is your Care EveryWhere ID. This could be used by other organizations to access your [facility] medical records  [id number]        Your Vitals Were     Pulse Temperature Respirations Height Last Period Pulse Oximetry    87 98.7  F (37.1  C) (Oral) 20 5' 3.5\" (1.613 m) 11/01/2017 (Approximate) 98%    Breastfeeding? BMI (Body Mass Index)                No 26.5 kg/m2           Blood Pressure from Last 3 Encounters:   11/15/17 117/87   07/19/17 134/87   04/11/17 124/80    Weight from Last 3 Encounters:   11/15/17 152 lb (68.9 kg)   07/19/17 147 lb 6 oz (66.8 kg)   04/11/17 141 lb 2 oz (64 kg)              We Performed the Following     UA reflex to Microscopic and Culture     Urine Microscopic          Today's Medication Changes          These changes are accurate as of: 11/15/17 12:14 PM.  If you have any questions, ask your nurse or doctor.               Start taking these medicines.        Dose/Directions    ondansetron 4 MG ODT tab   Commonly known as:  ZOFRAN ODT   Used for:  Right flank pain, Calculus of kidney   Started by:  Nisreen Tabares APRN CNP        Dose:  4-8 mg   Take 1-2 tablets (4-8 mg) by mouth 3 times daily (before meals)   Quantity:  20 tablet   Refills:  1       oxyCODONE-acetaminophen 5-325 MG per tablet   Commonly known as:  PERCOCET   Used for:  Right flank pain, Calculus of kidney   Started by:  Nisreen Tabares APRN CNP        Dose:  1 tablet   Take 1 tablet by mouth every 4 hours as needed for moderate to severe pain   Quantity:  18 tablet   Refills:  0       tamsulosin 0.4 MG capsule   Commonly known as:  FLOMAX   Used for:  Right flank pain, Calculus of kidney   Started by:  Nisreen Tabares APRN CNP        Dose:  0.4 mg   Take 1 capsule (0.4 mg) by mouth daily   Quantity:  30 capsule   Refills:  0         These medicines have changed or have " updated prescriptions.        Dose/Directions    clindamycin 150 MG capsule   Commonly known as:  CLEOCIN   This may have changed:    - when to take this  - reasons to take this  - additional instructions   Used for:  Chronic maxillary sinusitis, Chronic rhinitis, Nasal turbinate hypertrophy, Deviated nasal septum, Uncomplicated asthma, unspecified asthma severity        Open one capsule and place in 1 Litre of Normal Saline and mix. Irrigate with 20 cc each nostril QID.   Quantity:  10 capsule   Refills:  3            Where to get your medicines      These medications were sent to [facility] - [place], [place] - [address] [facility]  [address], [place] [address]     Phone:  [phone]     ondansetron 4 MG ODT tab    tamsulosin 0.4 MG capsule         Some of these will need a paper prescription and others can be bought over the counter.  Ask your nurse if you have questions.     Bring a paper prescription for each of these medications     oxyCODONE-acetaminophen 5-325 MG per tablet                Primary Care Provider Office Phone # Fax #    Janey Aguirre [phone]       [address] [place] [address]  [facility] [address]        Equal Access to Services     ASTER GONZALEZ : Hadii john armenta hadasho Soomaali, waaxda luqadaha, qaybta kaalmada adeegyada, jovany fernández . So [facility] [phone].    ATENCIÓN: Si habla español, tiene a case disposición servicios gratuitos de asistencia lingüística. [facility] [phone].    We comply with applicable federal civil rights laws and [place] laws. We do not discriminate on the basis of race, color, national origin, age, disability, sex, sexual orientation, or gender identity.            Thank you!     Thank you for choosing [facility]  for your care. Our goal is always to provide you with excellent care. Hearing back from our patients is one way we can continue to improve our services. Please take a few  minutes to complete the written survey that you may receive in the mail after your visit with us. Thank you!             Your Updated Medication List - Protect others around you: Learn how to safely use, store and throw away your medicines at www.disposemymeds.org.          This list is accurate as of: 11/15/17 12:14 PM.  Always use your most recent med list.                   Brand Name Dispense Instructions for use Diagnosis    Beclomethasone Dipropionate 80 MCG/ACT Aers Nasal Spray    QNASL    8.7 g    Spray 2 sprays into both nostrils daily    Allergic rhinitis caused by mold       busPIRone 15 MG tablet    BUSPAR    270 tablet    Take 1 tablet (15 mg) by mouth 3 times daily    Anxiety       CALCIUM-MAGNESIUM-ZINC PO           clindamycin 150 MG capsule    CLEOCIN    10 capsule    Open one capsule and place in 1 Litre of Normal Saline and mix. Irrigate with 20 cc each nostril QID.    Chronic maxillary sinusitis, Chronic rhinitis, Nasal turbinate hypertrophy, Deviated nasal septum, Uncomplicated asthma, unspecified asthma severity       escitalopram 20 MG tablet    LEXAPRO    90 tablet    Take 1 tablet (20 mg) by mouth daily    Anxiety       loratadine 10 MG tablet    CLARITIN     as needed Reported on 3/22/2017        norethindrone-ethinyl estradiol 1-20 MG-MCG per tablet    JUNEL FE 1/20    84 tablet    Take 1 tablet by mouth daily    Encounter for surveillance of contraceptive pills       OMEGA-3 FISH OIL PO      Take 4 capsules by mouth daily        ondansetron 4 MG ODT tab    ZOFRAN ODT    20 tablet    Take 1-2 tablets (4-8 mg) by mouth 3 times daily (before meals)    Right flank pain, Calculus of kidney       oxyCODONE-acetaminophen 5-325 MG per tablet    PERCOCET    18 tablet    Take 1 tablet by mouth every 4 hours as needed for moderate to severe pain    Right flank pain, Calculus of kidney       sodium chloride 0.9 % Soln     08124 mL    Irrigate 20 cc each nostril QID X 2 month supply    Chronic  maxillary sinusitis, Chronic rhinitis, Nasal turbinate hypertrophy, Deviated nasal septum, Uncomplicated asthma, unspecified asthma severity       sodium chloride 0.9% (bottle) 0.9 % irrigation     1000 mL    IRRIGATE 20 ML EACH NOSTRIL 4 TIMES DAILY FOR 2 MONTHS.    Chronic maxillary sinusitis       tamsulosin 0.4 MG capsule    FLOMAX    30 capsule    Take 1 capsule (0.4 mg) by mouth daily    Right flank pain, Calculus of kidney       traZODone 50 MG tablet    DESYREL    180 tablet    Take 2 tablets (100 mg) by mouth nightly as needed for sleep    Primary insomnia       VITAMIN B 12 PO           VITAMIN D (CHOLECALCIFEROL) PO      Take by mouth daily        VITAMIN K PO           XIIDRA 5 % Soln   Generic drug:  [employer]           zolpidem 6.25 MG CR tablet    AMBIEN CR    90 tablet    Take 1 tablet (6.25 mg) by mouth nightly as needed for sleep    Primary insomnia

## 2017-11-15 NOTE — PROGRESS NOTES
SUBJECTIVE:   Iliana Mcfarlane is a 52 year old female who presents to clinic today for the following health issues:  Chief Complaint   Patient presents with     Pain     Iliana has been having problems with Pain in right lower back.   The pain is constantly there, but it has times when it gets worse and then better again.  She has had no symptoms of urinary urgency, frequency, pain.  No fever.    History of a pyenephritis 20-30 years ago.  She can't remember if these symptoms feel similar.  She does remember that she had fever and chills with that kidney infection.    Healthy overall.  No fever today.  She did throw up last night.  She will feel sick to her stomach after she eats.  She did not eat breakfast today, but she does not feel nauseated.  She is drinking water.  Urinating normally.  No blood/pink tinge in her urine.    She has not taken any OTC products for the pain.   She did apply a hot pack to her right flank/low back. It did not help.    No acute back injury    Problem list and histories reviewed & adjusted, as indicated.  Additional history: as documented    Patient Active Problem List   Diagnosis     MENOMETRORRHAGIA     CARDIOVASCULAR SCREENING; LDL GOAL LESS THAN 160     Anxiety     Glaucoma, [place]     Genital herpes     Past Surgical History:   Procedure Laterality Date     C NONSPECIFIC PROCEDURE      Upper GI endoscopy     C NONSPECIFIC PROCEDURE      Tonsillectomy     C NONSPECIFIC PROCEDURE  85    Oral surgery, wisdom teeth     C NONSPECIFIC PROCEDURE           C NONSPECIFIC PROCEDURE      Hysteroscopy, ablation of uterine fibroid     C NONSPECIFIC PROCEDURE      miscarriage     ENT SURGERY      tonsillectomy     EYE SURGERY      trabeculectomy     GI SURGERY      dairy intolerence - resolved     HEAD & NECK SURGERY      trabeculectomy, cataract, yag     OPERATIVE HYSTEROSCOPY WITH MORCELLATOR  2014    Procedure: OPERATIVE HYSTEROSCOPY WITH MORCELLATOR;   Surgeon: Maureen Tavarez MD;  Location: [facility]     SURGICAL HISTORY OF -   12/11    right eye trabeculectomy     TONSILLECTOMY  1987       Social History   Substance Use Topics     Smoking status: Never Smoker     Smokeless tobacco: Never Used     Alcohol use 0.0 oz/week      Comment: minimal     Family History   Problem Relation Age of Onset     Hypertension Mother      Obesity Mother      Hyperlipidemia Mother      DIABETES Paternal Grandmother      CEREBROVASCULAR DISEASE Paternal Grandmother      Alcohol/Drug Paternal Grandfather      CANCER Paternal Grandfather      Substance Abuse Paternal Grandfather      HEART DISEASE Maternal Grandmother      Alzheimer Disease Maternal Grandmother      not until mid-90 yrs     Breast Cancer Maternal Aunt      CANCER Maternal Aunt      brain     CANCER Father      Other Cancer Father      CANCER Maternal Grandfather          Current Outpatient Prescriptions   Medication Sig Dispense Refill     escitalopram (LEXAPRO) 20 MG tablet Take 1 tablet (20 mg) by mouth daily 90 tablet PRN     norethindrone-ethinyl estradiol (JUNEL FE 1/20) 1-20 MG-MCG per tablet Take 1 tablet by mouth daily 84 tablet PRN     traZODone (DESYREL) 50 MG tablet Take 2 tablets (100 mg) by mouth nightly as needed for sleep 180 tablet PRN     zolpidem (AMBIEN CR) 6.25 MG CR tablet Take 1 tablet (6.25 mg) by mouth nightly as needed for sleep 90 tablet 1     busPIRone (BUSPAR) 15 MG tablet Take 1 tablet (15 mg) by mouth 3 times daily 270 tablet 3     Lifitegrast (XIIDRA) 5 % SOLN        clindamycin (CLEOCIN) 150 MG capsule Open one capsule and place in 1 Litre of Normal Saline and mix. Irrigate with 20 cc each nostril QID. (Patient taking differently: as needed Open one capsule and place in 1 Litre of Normal Saline and mix. Irrigate with 20 cc each nostril QID.) 10 capsule 3     loratadine (CLARITIN) 10 MG tablet as needed Reported on 3/22/2017       sodium chloride 0.9 % SOLN Irrigate 20 cc each nostril QID  "X 2 month supply 16521 mL 3     Cyanocobalamin (VITAMIN B 12 PO)        Omega-3 Fatty Acids (OMEGA-3 FISH OIL PO) Take 4 capsules by mouth daily        CALCIUM-MAGNESIUM-ZINC PO        VITAMIN D, CHOLECALCIFEROL, PO Take by mouth daily       VITAMIN K PO        Beclomethasone Dipropionate (QNASL) 80 MCG/ACT AERS Nasal Spray Spray 2 sprays into both nostrils daily (Patient not taking: Reported on 11/15/2017) 8.7 g 3     sodium chloride 0.9%, bottle, 0.9 % irrigation IRRIGATE 20 ML EACH NOSTRIL 4 TIMES DAILY FOR 2 MONTHS. (Patient not taking: Reported on 11/15/2017) 1000 mL 3     Allergies   Allergen Reactions     No Known Drug Allergies      Recent Labs   Lab Test  04/11/17   0909  01/19/17   2112  07/20/16   1253  03/01/16   0914  10/14/14   0856  07/10/14   0749   LDL  131*   --    --   159*  148*   --    HDL  84   --    --   72  55   --    TRIG  91   --    --   104  150   --    ALT   --    --   27  145*   --   36   CR   --   0.89   --   0.79   --   0.74   GFRESTIMATED   --   67   --   77   --   84   GFRESTBLACK   --   81   --   >90   GFR Calc     --   >90   POTASSIUM   --   3.4   --   4.1   --   4.1   TSH   --    --    --    --   0.94   --       BP Readings from Last 3 Encounters:   11/15/17 117/87   07/19/17 134/87   04/11/17 124/80    Wt Readings from Last 3 Encounters:   11/15/17 152 lb (68.9 kg)   07/19/17 147 lb 6 oz (66.8 kg)   04/11/17 141 lb 2 oz (64 kg)            Labs reviewed in EPIC    Reviewed and updated as needed this visit by clinical staffAllergies       Reviewed and updated as needed this visit by Provider         ROS:  Constitutional, HEENT, cardiovascular, pulmonary, GI, , musculoskeletal, neuro, skin, endocrine and psych systems are negative, except as otherwise noted.      OBJECTIVE:   /87  Pulse 87  Temp 98.7  F (37.1  C) (Oral)  Resp 20  Ht 5' 3.5\" (1.613 m)  Wt 152 lb (68.9 kg)  LMP 11/01/2017 (Approximate)  SpO2 98%  Breastfeeding? No  BMI 26.5 " kg/m2  Body mass index is 26.5 kg/(m^2).  Constitutional: healthy, alert and mild distress. She appears uncomfortable.   Neck: supple, no adenopathy  Cardiovascular: RRR. No murmurs, clicks gallops or rub  Respiratory: Respirations easy and regular. No respiratory distress noted. Lung sounds clear to auscultation.  Gastrointestinal: + right sided CVA tenderness  Neurologic: Gait normal. Reflexes normal and symmetric. Sensation grossly WNL.  Psychiatric: mentation appears normal and affect normal/bright    Results for orders placed or performed in visit on 11/15/17   UA reflex to Microscopic and Culture   Result Value Ref Range    Color Urine Yellow     Appearance Urine Clear     Glucose Urine Negative NEG^Negative mg/dL    Bilirubin Urine Negative NEG^Negative    Ketones Urine Negative NEG^Negative mg/dL    Specific Gravity Urine 1.025 1.003 - 1.035    Blood Urine Moderate (A) NEG^Negative    pH Urine 6.5 5.0 - 7.0 pH    Protein Albumin Urine 100 (A) NEG^Negative mg/dL    Urobilinogen Urine 0.2 0.2 - 1.0 EU/dL    Nitrite Urine Negative NEG^Negative    Leukocyte Esterase Urine Negative NEG^Negative    Source Midstream Urine    Urine Microscopic   Result Value Ref Range    WBC Urine O - 2 OTO2^O - 2 /HPF    RBC Urine 25-50 (A) OTO2^O - 2 /HPF    Squamous Epithelial /LPF Urine Few FEW^Few /LPF    Bacteria Urine Few (A) NEG^Negative /HPF    Mucous Urine Present (A) NEG^Negative /LPF         ASSESSMENT/PLAN:     (R10.9) Right flank pain  (primary encounter diagnosis)  Comment: acute  Plan: XR KUB, UA reflex to Microscopic and Culture,         Urine Microscopic, oxyCODONE-acetaminophen         (PERCOCET) 5-325 MG per tablet, ondansetron         (ZOFRAN ODT) 4 MG ODT tab, tamsulosin (FLOMAX)         0.4 MG capsule, Stone analysis            (N20.0) Calculus of kidney  Comment: acute  Plan: oxyCODONE-acetaminophen (PERCOCET) 5-325 MG per        tablet, ondansetron (ZOFRAN ODT) 4 MG ODT tab,         tamsulosin (FLOMAX) 0.4  MG capsule, Stone         analysis          Patient Instructions   For today, I am treating you for a kidney stone.  Push fluids.  Rest.    Medications:  Nausea: Odensatron is for nausea. You can take this as needed for nausea.  Pain control: you can take ibuprofen or tylenol intermittently for pain. You can take the narcotic, percocet, as prescribed/needed for severe pain. Do not take extra tylenol with the percocet.  Flomax/tamsulosin: this helps kidney flow. Take daily as prescribed for 2-4 weeks. If you pass the stone and your pain subsides, you can stop this medication.    If your pain worsens in any way, go to the ER.    If you collect a stone, please bring it back to the lab for analysis.    If your pain persists (without significant worsening) for the next 4-7 days, please let me now and I would order a CT scan for abdominal evaluation.              Kidney Stone (Urine)  Does this test have other names?  Urine stone risk profile, 24-hour collection  What is this test?  This test checks your urine for chemicals that might cause your body to form kidney stones. The test also looks for blood in your urine, which can be a symptom of kidney stones.  Kidney stones are hard masses of minerals and salts that can form in your kidneys. They can be as small as a grain of sand or more than an inch in diameter. Usually theses stones or crystals pass through your body when you urinate. But sometimes they can get stuck in your urinary tract and cause a lot of pain.  Why do I need this test?  You may need this test if your healthcare provider suspects that you have kidney stones. Symptoms of kidney stones include:    Pain in your lower belly (abdomen) or side    Nausea and vomiting    Sudden, strong urge to urinate    Pain when urinating    Blood in your urine  You may also have this test if you had a kidney stone or you are being treated for kidney stones. If you have had a kidney stone or any treatments for a kidney  stone, you should wait 1 to 2 months, or until you have completely recovered, before having this test.  You will need to repeat the test at least twice so your healthcare provider can compare the results.  What other tests might I have along with this test?  Your healthcare provider may also order imaging tests. These include an ultrasound, CT scan and, a special type of X-ray (pyelogram) that uses a dye to look for kidney stones.  Your provider is also likely to order blood tests, to look for calcium, phosphate, uric acid, oxalate, and citrate. These are some of the chemicals that are most likely to cause your body to form kidney stones.  What do my test results mean?  Many things may affect your lab test results. These include the method each lab uses to do the test. Even if your test results are different from the normal value, you may not have a problem. To learn what the results mean for you, talk with your healthcare provider.  The results will show whether your urine has high or low levels of the chemicals that are most likely to cause stones to form. These chemicals are calcium, phosphate, uric acid, oxalate, and citrate.  If your levels are not normal, it may mean that you have a kidney stone or stones.  Abnormal levels may also mean that you have another kidney disorder, such as a urinary tract infection.  How is this test done?  This test requires a 24-hour urine sample. For this sample, you must collect all of your urine for 24 hours. Empty your bladder completely first in the morning without collecting it and note the time. Then collect your urine every time you go to the bathroom over the next 24 hours. As you collect the urine, store it in the refrigerator.  Does this test pose any risks?  This test does not pose any known risks.  What might affect my test results?  Having this test too soon after treatment for a previous kidney stone can affect your results. You should wait several months after  treatment before having this test.  How do I get ready for this test?  You don't need to prepare for this test.      4451-6850 [employer]. [address] [place] [address], [place] [address]. All rights reserved. This information is not intended as a substitute for professional medical care. Always follow your healthcare professional's instructions.              DARRIN Llanos [facility]

## 2017-11-16 NOTE — PROGRESS NOTES
Bernabe Barrientos,    This note is to let you know that the radiologist did see on your abdominal x-ray what appears to be a small kidney stone. Please take your medications as prescribed and let me know if there is anything else I can do for you.    Nisreen CLIFFORD CNP

## 2017-11-21 ENCOUNTER — TELEPHONE (OUTPATIENT)
Dept: FAMILY MEDICINE | Facility: CLINIC | Age: 52
End: 2017-11-21

## 2017-11-21 DIAGNOSIS — R10.9 RIGHT FLANK PAIN: Primary | ICD-10-CM

## 2017-11-21 NOTE — TELEPHONE ENCOUNTER
Return call to patient - discussed provider response - CT scheduling information given - advised consistent pain management - may increase if needed - consistent or increase use of zofran - if unable to manage pain, nausea/vomiting becomes dehydration, severe weakness or feeling faint - to ED/911 for assessment    Reece Blanc RN

## 2017-11-21 NOTE — TELEPHONE ENCOUNTER
"CO review as you saw this patient for KATHLEEN on 11/15 for a kidney stone.      Continues to have nausea and vomiting and dry heaves. It was an especially \"bad\" night.  Used up first zofran RX. Has a refill.    Right flank pain at a 5-6 on pain scale. Comes and goes.  Has 9 percocet left.  Pushing fluids. Straining urine. No stone seen yet.    What is next step since not really resolving yet?    CT scan next?    Any other helps for nausea?  Sirena Glass RN      "

## 2017-11-21 NOTE — TELEPHONE ENCOUNTER
Yes, she needs a CT scan (this was discussed and documented in my clinic note with her last appointment).  I did order the CT scan stat. If I didn't order it stat, it was routine, up to one week for scheduling.  She is a patient of Janey Aguirre.  Please ask her to schedule/complete the CT scan ASAP and follow up with me or Janey.  Also, she may need a referral/consult with urology.  If her pain remains significant, I had encouraged her to go to the ER for pain management and CT scan if they feel it is appropriate.  That would still be an option for her.  Thank you.

## 2017-11-24 ENCOUNTER — TELEPHONE (OUTPATIENT)
Dept: OTHER | Facility: CLINIC | Age: 52
End: 2017-11-24

## 2017-11-24 ENCOUNTER — TELEPHONE (OUTPATIENT)
Dept: FAMILY MEDICINE | Facility: CLINIC | Age: 52
End: 2017-11-24

## 2017-11-24 DIAGNOSIS — K80.20 CALCULUS OF GALLBLADDER WITHOUT CHOLECYSTITIS WITHOUT OBSTRUCTION: ICD-10-CM

## 2017-11-24 DIAGNOSIS — N20.0 RIGHT KIDNEY STONE: Primary | ICD-10-CM

## 2017-11-24 PROBLEM — K57.30 DIVERTICULOSIS OF LARGE INTESTINE WITHOUT HEMORRHAGE: Status: ACTIVE | Noted: 2017-11-24

## 2017-11-24 NOTE — TELEPHONE ENCOUNTER
Telephone Encounter  Author: Hamzah Jacinto MD    Date: 11:23 AM; 11/24/2017  Patient Name: Iliana Mcfarlane  MRN: [id number]    Ms. Iliana Mcfarlane is a 52 year old FEMALE with flank pain x10 days; right sided ureteral stone noted today as noted:    Imaging: All studies were reviewed by me today.  Recent Results (from the past 24 hour(s))   CT Abdomen Pelvis w/o Contrast    Narrative    EXAMINATION: CT ABDOMEN PELVIS W/O CONTRAST, 11/24/2017 8:42 AM    TECHNIQUE:  Helical CT images from the lung bases through the  symphysis pubis were obtained  without IV contrast.    COMPARISON: Ultrasound abdomen on 6/22/2009    HISTORY: abdominal pain, history of kidney stones, probably kidney  stone at this time.; Right flank pain    FINDINGS:  Lung bases: are clear.    Abdomen/pelvis: There is a 5 x 3 x 6 mm cm obstructing midright  ureteric stone causing mild/moderate upstream hydroureteronephrosis  (series 3 image 191 and series 4 image 31). No additional radiodense  kidney or ureteric stones identified. The left kidney is unremarkable.  No stones in the urinary bladder.    There is tiny gallstone. Focal hypodense area in the gallbladder  fundus, could be due to adenomyomatosis versus sludge within the  fundus with associated mucosal fold. The unenhanced liver, spleen,  adrenal glands and pancreas are unremarkable. Small presumed accessory  spleens/splenules.    Extensive colonic/sigmoid diverticulosis without CT evidence of acute  diverticular disease. The appendix is normal. No abnormally dilated  bowel loops. No significant free fluid in the abdomen and pelvis. No  free peritoneal portal venous gas. No significant lymphadenopathy in  the abdomen and pelvis. Normal appearance of the uterus for age. No  adnexal mass.    Bones: No worrisome osseous lesion.      Impression    IMPRESSION:   1. 6 mm obstructing ureteral ureteral stone with mild associated  hydroureteronephrosis.  2. Extensive colonic diverticulosis  without CT evidence of acute  diverticulitis.  3. Cholelithiasis without CT evidence of acute cholecystitis.  Additional hyperdense material within the gallbladder fundus, could be  sludge within the gallbladder fundus versus focal fundal  adenomyomatosis.  Right upper quadrant ultrasound could be considered  if clinically indicated.    Findings were communicated to the ordering provider MICKEY RYAN over the telephone by Dr. Robledo at approximately at 9:00  AM on 11/24/2017.    I have personally reviewed the examination and initial interpretation  and I agree with the findings.    DINH ROBLEDO MD        I contacted the patient to discuss options for management.  Currently, Ms. Mcfarlane is controlling her pain with ibuprofen and at night, she uses narcotics.  She is not having any fevers.  She has some nausea with occasional emesis over the past few days.  When queried specifically about her ability to keep herself hydrated, she notes that she is able to tolerate large amounts of water and has no concern for dehydration.  I discussed that as she does not have an emergent need for intervention, her options would be: 1) to come to the ED for evaluation with tentative plan for right-sided ureteral stent placement or 2) to have us contact urology staff who are in the OR next week to try to set up planned ureteroscopy & stone treatment.  She feels currently that her symptoms are relatively well controlled and that she would like to see if she can be scheduled for early next week.  Questions solicited & answered.  Strict return precautions given, including but not limited to: fevers > 101.4, chills, an inability to keep food or fluids down, pain not controlled with oral medications, or an inability to urinate.  I discussed with the patient that I will contact her when a plan is in place.    --    Darwin Jacinto MD   Urology Resident  pgr: [phone]    11:23 AM; 11/24/2017

## 2017-11-24 NOTE — TELEPHONE ENCOUNTER
I received a telephone call from the [facility] radiologist regarding Iliana's CT scan/abd.  She is at the radiology department now.  She has a 4-5mm right kidney stone, obstructing.    I talked with Iliana.  She is aware of her kidney stone report.  She continues to have a moderate amount of pain and is having problems working.  She is taking ibuprofen fairly regularly for the pain which takes the edge off.  She has taken some percocet intermittently, but she has some problems with nausea and constipation so she is taking more ibuprofen.  She states she is staying hydrated.  She urinates freely and does not feel dehydrated.  She is wondering what a plan may be for her to help pass this painful stone.    I had a discussion with Dr. Rae here at [facility].  He concurs that an appointment with the urologist at this point is reasonable.    Telephone call to the [facility] kidney stone clinic.  Next available appointment is January 5th.    Telephone call to the [facility] Kidney stone clinic provider on call, Dr. Isabel.  HPI was given.  Per Dr. Isabel, he/his team will get her in for an appointment or a procedure to help clear the stone.  They will call her back with a plan.    I did call Iliana back to let her know the above conversation.  She will await a call from the urology team and will follow up as instructed.  She was appreciative of my call.

## 2017-11-28 ENCOUNTER — ANESTHESIA EVENT (OUTPATIENT)
Dept: SURGERY | Facility: CLINIC | Age: 52
End: 2017-11-28
Payer: COMMERCIAL

## 2017-11-28 ENCOUNTER — ALLIED HEALTH/NURSE VISIT (OUTPATIENT)
Dept: SURGERY | Facility: CLINIC | Age: 52
End: 2017-11-28

## 2017-11-28 ENCOUNTER — OFFICE VISIT (OUTPATIENT)
Dept: SURGERY | Facility: CLINIC | Age: 52
End: 2017-11-28

## 2017-11-28 VITALS
BODY MASS INDEX: 26.03 KG/M2 | HEART RATE: 89 BPM | WEIGHT: 152.5 LBS | RESPIRATION RATE: 16 BRPM | TEMPERATURE: 98.4 F | SYSTOLIC BLOOD PRESSURE: 137 MMHG | DIASTOLIC BLOOD PRESSURE: 88 MMHG | HEIGHT: 64 IN | OXYGEN SATURATION: 98 %

## 2017-11-28 DIAGNOSIS — N20.1 CALCULUS OF URETER: Primary | ICD-10-CM

## 2017-11-28 DIAGNOSIS — N20.0 KIDNEY STONES: Primary | ICD-10-CM

## 2017-11-28 DIAGNOSIS — N20.0 KIDNEY STONES: ICD-10-CM

## 2017-11-28 DIAGNOSIS — N20.1 CALCULUS OF URETER: ICD-10-CM

## 2017-11-28 DIAGNOSIS — Z01.818 PREOP EXAMINATION: Primary | ICD-10-CM

## 2017-11-28 LAB
ALBUMIN UR-MCNC: NEGATIVE MG/DL
ANION GAP SERPL CALCULATED.3IONS-SCNC: 7 MMOL/L (ref 3–14)
APPEARANCE UR: CLEAR
BILIRUB UR QL STRIP: NEGATIVE
BUN SERPL-MCNC: 12 MG/DL (ref 7–30)
CALCIUM SERPL-MCNC: 9 MG/DL (ref 8.5–10.1)
CHLORIDE SERPL-SCNC: 103 MMOL/L (ref 94–109)
CO2 SERPL-SCNC: 27 MMOL/L (ref 20–32)
COLOR UR AUTO: ABNORMAL
CREAT SERPL-MCNC: 1.05 MG/DL (ref 0.52–1.04)
ERYTHROCYTE [DISTWIDTH] IN BLOOD BY AUTOMATED COUNT: 12.4 % (ref 10–15)
GFR SERPL CREATININE-BSD FRML MDRD: 55 ML/MIN/1.7M2
GLUCOSE SERPL-MCNC: 103 MG/DL (ref 70–99)
GLUCOSE UR STRIP-MCNC: NEGATIVE MG/DL
HCT VFR BLD AUTO: 38.6 % (ref 35–47)
HGB BLD-MCNC: 12.5 G/DL (ref 11.7–15.7)
HGB UR QL STRIP: ABNORMAL
KETONES UR STRIP-MCNC: NEGATIVE MG/DL
LEUKOCYTE ESTERASE UR QL STRIP: NEGATIVE
MCH RBC QN AUTO: 31.3 PG (ref 26.5–33)
MCHC RBC AUTO-ENTMCNC: 32.4 G/DL (ref 31.5–36.5)
MCV RBC AUTO: 97 FL (ref 78–100)
MUCOUS THREADS #/AREA URNS LPF: PRESENT /LPF
NITRATE UR QL: NEGATIVE
PH UR STRIP: 6 PH (ref 5–7)
PLATELET # BLD AUTO: 325 10E9/L (ref 150–450)
POTASSIUM SERPL-SCNC: 3.4 MMOL/L (ref 3.4–5.3)
RBC # BLD AUTO: 4 10E12/L (ref 3.8–5.2)
RBC #/AREA URNS AUTO: 36 /HPF (ref 0–2)
SODIUM SERPL-SCNC: 137 MMOL/L (ref 133–144)
SOURCE: ABNORMAL
SP GR UR STRIP: 1.01 (ref 1–1.03)
SQUAMOUS #/AREA URNS AUTO: <1 /HPF (ref 0–1)
UROBILINOGEN UR STRIP-MCNC: 0 MG/DL (ref 0–2)
WBC # BLD AUTO: 5.7 10E9/L (ref 4–11)
WBC #/AREA URNS AUTO: 3 /HPF (ref 0–2)

## 2017-11-28 ASSESSMENT — LIFESTYLE VARIABLES: TOBACCO_USE: 0

## 2017-11-28 NOTE — PATIENT INSTRUCTIONS
Preparing for Your Surgery      Name:  Iliana Mcfarlane   MRN:  [id number]   :  1965   Today's Date:  2017     Arriving for surgery:  Surgery date:  2017  Arrival time:    1200 noon      Please come to:     [facility] [address]  [address] [place]  [address]    - parking is available in front of [facility] from 5:15AM to 8:00PM. If you prefer, park your car in the Green Lot.    -Proceed to the 3rd floor, check in at the Adult Surgery Waiting Lounge. [phone]    If an escort is needed stop at the Information Desk in the lobby. Inform the information person that you are here for surgery. An escort to the Adult Surgery Waiting Lounge will be provided.        What can I eat or drink?  -  You may have solid food or milk products until 8 hours prior to your surgery; until 600 am on ; e;g;  Toast, cereal  -  You may have water, apple juice or 7up/Sprite until 2 hours prior to your surgery; until 1200 noon    Which medicines can I take?    -  Hold all vitamins and supplements for one week before surgery    -  Please take only these medications the day of surgery:  Pain medication if needed, zofran if needed for nausea, tamsulosin, buspirone if needed      How do I prepare myself?  -  Take two showers: one the night before surgery; and one the morning of surgery.         Use Scrubcare or Hibiclens to wash from neck down.  You may use your own shampoo and conditioner. No other hair products.   -  Do NOT use lotion, powder, deodorant, or antiperspirant the day of your surgery.  -  Do NOT wear any makeup, fingernail polish or jewelry.   -Do not bring your own medications to the hospital, except for inhalers and eye drops.  -  Bring your ID and insurance card.    Questions or Concerns:  If you have questions or concerns, please call the  Preoperative Assessment Center, Monday-Friday 7AM-7PM:  [phone]    AFTER YOUR SURGERY  Breathing  exercises   Breathing exercises help you recover faster. Take deep breaths and let the air out slowly. This will:     Help you wake up after surgery.    Help prevent complications like pneumonia.  Preventing complications will help you go home sooner.   We may give you a breathing device (incentive spirometer) to encourage you to breathe deeply.   Nausea and vomiting   You may feel sick to your stomach after surgery; if so, let your nurse know.    Pain control:  After surgery, you may have pain. Our goal is to help you manage your pain. Pain medicine will help you feel comfortable enough to do activities that will help you heal.  These activities may include breathing exercises, walking and physical therapy.   To help your health care team treat your pain we will ask: 1) If you have pain  2) where it is located 3) describe your pain in your words  Methods of pain control include medications given by mouth, vein or by nerve block for some surgeries.  We may give you a pain control pump that will:  1) Deliver the medicine through a tube placed in your vein  2) Control the amount of medicine you receive  3) Allow you to push a button to deliver a dose of pain medicine  Sequential Compression Device (SCD) or Pneumo Boots:  You may need to wear SCD S on your legs or feet. These are wraps connected to a machine that pumps in air and releases it. The repeated pumping helps prevent blood clots from forming.

## 2017-11-28 NOTE — MR AVS SNAPSHOT
After Visit Summary   2017    Iliana Mcfarlane    MRN: [id number]           Patient Information     Date Of Birth          1965        Visit Information        Provider Department      2017 4:00 PM Rn, [facility]        Care Instructions    Preparing for Your Surgery      Name:  Iliana Mcfarlane   MRN:  [id number]   :  1965   Today's Date:  2017     Arriving for surgery:  Surgery date:  2017  Arrival time:    1200 noon      Please come to:     [facility] [address]  [address] [place], [place] [address]. [place]  [address]    - parking is available in front of [facility] from 5:15AM to 8:00PM. If you prefer, park your car in the Green Lot.    -Proceed to the 3rd floor, check in at the Adult Surgery Waiting Lounge. [phone]    If an escort is needed stop at the Information Desk in the lobby. Inform the information person that you are here for surgery. An escort to the Adult Surgery Waiting Lounge will be provided.        What can I eat or drink?  -  You may have solid food or milk products until 8 hours prior to your surgery; until 600 am on ; e;g;  Toast, cereal  -  You may have water, apple juice or 7up/Sprite until 2 hours prior to your surgery; until 1200 noon    Which medicines can I take?    -  Hold all vitamins and supplements for one week before surgery    -  Please take only these medications the day of surgery:  Pain medication if needed, zofran if needed for nausea, tamsulosin, buspirone if needed      How do I prepare myself?  -  Take two showers: one the night before surgery; and one the morning of surgery.         Use Scrubcare or Hibiclens to wash from neck down.  You may use your own shampoo and conditioner. No other hair products.   -  Do NOT use lotion, powder, deodorant, or antiperspirant the day of your surgery.  -  Do NOT wear any makeup, fingernail polish or  lynette.   -Do not bring your own medications to the hospital, except for inhalers and eye drops.  -  Bring your ID and insurance card.    Questions or Concerns:  If you have questions or concerns, please call the  Preoperative Assessment Center, Monday-Friday 7AM-7PM:  [phone]    AFTER YOUR SURGERY  Breathing exercises   Breathing exercises help you recover faster. Take deep breaths and let the air out slowly. This will:     Help you wake up after surgery.    Help prevent complications like pneumonia.  Preventing complications will help you go home sooner.   We may give you a breathing device (incentive spirometer) to encourage you to breathe deeply.   Nausea and vomiting   You may feel sick to your stomach after surgery; if so, let your nurse know.    Pain control:  After surgery, you may have pain. Our goal is to help you manage your pain. Pain medicine will help you feel comfortable enough to do activities that will help you heal.  These activities may include breathing exercises, walking and physical therapy.   To help your health care team treat your pain we will ask: 1) If you have pain  2) where it is located 3) describe your pain in your words  Methods of pain control include medications given by mouth, vein or by nerve block for some surgeries.  We may give you a pain control pump that will:  1) Deliver the medicine through a tube placed in your vein  2) Control the amount of medicine you receive  3) Allow you to push a button to deliver a dose of pain medicine  Sequential Compression Device (SCD) or Pneumo Boots:  You may need to wear SCD S on your legs or feet. These are wraps connected to a machine that pumps in air and releases it. The repeated pumping helps prevent blood clots from forming.                 Follow-ups after your visit        Your next 10 appointments already scheduled     Nov 28, 2017  4:30 PM CST   (Arrive by 4:15 PM)   PAC Anesthesia Consult with  Pac Anesthesiologist   [facility] ([facility])    [address]  4th Floor  [facility] [id number] [phone]            Nov 28, 2017  4:45 PM CST   LAB with  LAB   [facility] ([facility])    [address]  1st Floor  [facility] [id number] [phone]           Please do not eat 10-12 hours before your appointment if you are coming in fasting for labs on lipids, cholesterol, or glucose (sugar). This does not apply to pregnant women. Water, hot tea and black coffee (with nothing added) are okay. Do not drink other fluids, diet soda or chew gum.            Nov 30, 2017   Procedure with Helio Lauren MD   [facility] [place], Same Day Surgery (--)    [address]  [facility] [address] [phone]              Future tests that were ordered for you today     Open Future Orders        Priority Expected Expires Ordered    Urine Culture Aerobic Bacterial Routine  11/28/2018 11/28/2017    Routine UA with microscopic - No culture Routine  11/28/2018 11/28/2017    CBC with platelets Routine  11/28/2018 11/28/2017    Routine UA with micro reflex to culture Routine  11/28/2018 11/28/2017    Basic metabolic panel Routine  11/28/2018 11/28/2017            Who to contact     Please call your clinic at [phone] to:    Ask questions about your health    Make or cancel appointments    Discuss your medicines    Learn about your test results    Speak to your doctor   If you have compliments or concerns about an experience at your clinic, or if you wish to file a complaint, please contact [facility] Patient Relations at [phone] or email us at [email or web address]         Additional Information About Your Visit        MyChart Information     [employer] gives you secure access to your electronic health record. If you see a primary care provider, you can also send messages to your care team and make  appointments. If you have questions, please call your primary care clinic.  If you do not have a primary care provider, please call [phone] and they will assist you.      [employer] is an electronic gateway that provides easy, online access to your medical records. With [employer], you can request a clinic appointment, read your test results, renew a prescription or communicate with your care team.     To access your existing account, please contact your [facility] or call [phone] for assistance.        Care EveryWhere ID     This is your Care EveryWhere ID. This could be used by other organizations to access your [facility] medical records  [id number]        Your Vitals Were     Last Period                   11/24/2017            Blood Pressure from Last 3 Encounters:   11/28/17 137/88   11/15/17 117/87   07/19/17 134/87    Weight from Last 3 Encounters:   11/28/17 69.2 kg (152 lb 8 oz)   11/15/17 68.9 kg (152 lb)   07/19/17 66.8 kg (147 lb 6 oz)              Today, you had the following     No orders found for display         Today's Medication Changes          These changes are accurate as of: 11/28/17  4:04 PM.  If you have any questions, ask your nurse or doctor.               These medicines have changed or have updated prescriptions.        Dose/Directions    busPIRone 15 MG tablet   Commonly known as:  BUSPAR   This may have changed:    - when to take this  - reasons to take this   Used for:  Anxiety        Dose:  15 mg   Take 1 tablet (15 mg) by mouth 3 times daily   Quantity:  270 tablet   Refills:  3       clindamycin 150 MG capsule   Commonly known as:  CLEOCIN   This may have changed:    - when to take this  - reasons to take this  - additional instructions   Used for:  Chronic maxillary sinusitis, Chronic rhinitis, Nasal turbinate hypertrophy, Deviated nasal septum, Uncomplicated asthma, unspecified asthma severity        Open one capsule and place in 1 Litre of  Normal Saline and mix. Irrigate with 20 cc each nostril QID.   Quantity:  10 capsule   Refills:  3       escitalopram 20 MG tablet   Commonly known as:  LEXAPRO   This may have changed:  when to take this   Used for:  Anxiety        Dose:  20 mg   Take 1 tablet (20 mg) by mouth daily   Quantity:  90 tablet   Refills:  PRN       norethindrone-ethinyl estradiol 1-20 MG-MCG per tablet   Commonly known as:  JUNEL FE 1/20   This may have changed:  when to take this   Used for:  Encounter for surveillance of contraceptive pills        Dose:  1 tablet   Take 1 tablet by mouth daily   Quantity:  84 tablet   Refills:  PRN       sodium chloride 0.9 % Soln   This may have changed:    - when to take this  - reasons to take this  - additional instructions   Used for:  Chronic maxillary sinusitis, Chronic rhinitis, Nasal turbinate hypertrophy, Deviated nasal septum, Uncomplicated asthma, unspecified asthma severity        Irrigate 20 cc each nostril QID X 2 month supply   Quantity:  39766 mL   Refills:  3       tamsulosin 0.4 MG capsule   Commonly known as:  FLOMAX   This may have changed:  when to take this   Used for:  Right flank pain, Calculus of kidney        Dose:  0.4 mg   Take 1 capsule (0.4 mg) by mouth daily   Quantity:  30 capsule   Refills:  0                Primary Care Provider Office Phone # Fax #    Janey LEE Aguirre [phone] [address] [facility] [address]        Equal Access to Services     ASTER GONZALEZ AH: Hadii john armenta hadasho Soomaali, waaxda luqadaha, qaybta kaalmada saviegyada, jovany hua adeapoorva fernández . So [facility] [phone].    ATENCIÓN: Si habla español, tiene a case disposición servicios gratuitos de asistencia lingüística. Vargas al [phone].    We comply with applicable federal civil rights laws and [place] laws. We do not discriminate on the basis of race, color, national origin, age, disability, sex, sexual orientation, or gender identity.             Thank you!     Thank you for choosing [facility]  for your care. Our goal is always to provide you with excellent care. Hearing back from our patients is one way we can continue to improve our services. Please take a few minutes to complete the written survey that you may receive in the mail after your visit with us. Thank you!             Your Updated Medication List - Protect others around you: Learn how to safely use, store and throw away your medicines at www.disposemymeds.org.          This list is accurate as of: 11/28/17  4:04 PM.  Always use your most recent med list.                   Brand Name Dispense Instructions for use Diagnosis    Beclomethasone Dipropionate 80 MCG/ACT Aers Nasal Spray    QNASL    8.7 g    Spray 2 sprays into both nostrils daily    Allergic rhinitis caused by mold       busPIRone 15 MG tablet    BUSPAR    270 tablet    Take 1 tablet (15 mg) by mouth 3 times daily    Anxiety       CALCIUM-MAGNESIUM-ZINC PO      Take 1 tablet by mouth every evening        clindamycin 150 MG capsule    CLEOCIN    10 capsule    Open one capsule and place in 1 Litre of Normal Saline and mix. Irrigate with 20 cc each nostril QID.    Chronic maxillary sinusitis, Chronic rhinitis, Nasal turbinate hypertrophy, Deviated nasal septum, Uncomplicated asthma, unspecified asthma severity       escitalopram 20 MG tablet    LEXAPRO    90 tablet    Take 1 tablet (20 mg) by mouth daily    Anxiety       ibuprofen 100 MG Tabs      Take 500 mg by mouth as needed        loratadine 10 MG tablet    CLARITIN     Take 10 mg by mouth as needed Reported on 3/22/2017        norethindrone-ethinyl estradiol 1-20 MG-MCG per tablet    JUNEL FE 1/20    84 tablet    Take 1 tablet by mouth daily    Encounter for surveillance of contraceptive pills       OMEGA-3 FISH OIL PO      Take 4 capsules by mouth every evening        ondansetron 4 MG ODT tab    ZOFRAN ODT    20 tablet    Take 1-2 tablets (4-8 mg) by  mouth 3 times daily (before meals)    Right flank pain, Calculus of kidney       oxyCODONE-acetaminophen 5-325 MG per tablet    PERCOCET    18 tablet    Take 1 tablet by mouth every 4 hours as needed for moderate to severe pain    Right flank pain, Calculus of kidney       sodium chloride 0.9 % Soln     74171 mL    Irrigate 20 cc each nostril QID X 2 month supply    Chronic maxillary sinusitis, Chronic rhinitis, Nasal turbinate hypertrophy, Deviated nasal septum, Uncomplicated asthma, unspecified asthma severity       sodium chloride 0.9% (bottle) 0.9 % irrigation     1000 mL    IRRIGATE 20 ML EACH NOSTRIL 4 TIMES DAILY FOR 2 MONTHS.    Chronic maxillary sinusitis       tamsulosin 0.4 MG capsule    FLOMAX    30 capsule    Take 1 capsule (0.4 mg) by mouth daily    Right flank pain, Calculus of kidney       traZODone 50 MG tablet    DESYREL    180 tablet    Take 2 tablets (100 mg) by mouth nightly as needed for sleep    Primary insomnia       VITAMIN B 12 PO      Take 1 capsule by mouth every evening        VITAMIN D (CHOLECALCIFEROL) PO      Take by mouth every evening        VITAMIN K PO           XIIDRA 5 % Soln   Generic drug:  Lifitegrast      Apply 1 drop to eye 2 times daily        zolpidem 6.25 MG CR tablet    AMBIEN CR    90 tablet    Take 1 tablet (6.25 mg) by mouth nightly as needed for sleep    Primary insomnia

## 2017-11-28 NOTE — ANESTHESIA PREPROCEDURE EVALUATION
Anesthesia Evaluation     . Pt has had prior anesthetic. Type: General and MAC    History of anesthetic complications   - PONV        ROS/MED HX    ENT/Pulmonary:     (+)MEKA risk factors daytime somnolence, allergic rhinitis, , . .   (-) tobacco use and asthma   Neurologic:  - neg neurologic ROS   (+)delerium     Cardiovascular:  - neg cardiovascular ROS   (+) ----. : . . . :. . Previous cardiac testing date:results:date: results:ECG reviewed date:1/9/17 results:SR date: results:          METS/Exercise Tolerance:  >4 METS   Hematologic:  - neg hematologic  ROS       Musculoskeletal:  - neg musculoskeletal ROS       GI/Hepatic: Comment: Occasional GERD, no meds, no symptoms recently. Recent nausea.    (+) GERD       Renal/Genitourinary:  - ROS Renal section negative   (+) Nephrolithiasis ,       Endo:  - neg endo ROS       Psychiatric:     (+) psychiatric history anxiety      Infectious Disease:  - neg infectious disease ROS       Malignancy:      - no malignancy   Other:    (+) C-spine cleared: N/A, H/O Chronic Pain,no other significant disability                    Physical Exam  Normal systems: dental    Airway   Mallampati: II  TM distance: >3 FB  Neck ROM: full    Dental     Cardiovascular   Rhythm and rate: regular and normal      Pulmonary    breath sounds clear to auscultation    Other findings: For further details of assessment, testing, and physical exam please see H and P completed on same date.           PAC Discussion and Assessment    ASA Classification: 2  Case is suitable for: [facility]  Anesthetic techniques and relevant risks discussed: GA  Invasive monitoring and risk discussed: No  Types:   Possibility and Risk of blood transfusion discussed: No  NPO instructions given:   Additional anesthetic preparation and risks discussed:   Needs early admission to pre-op area:   Other:     PAC Resident/NP Anesthesia Assessment:  Iliana Mcfarlane is a 52 year old female scheduled to undergo cystoscopy,  right ureteroscopy, laser lithotripsy, stent placement with Dr. Lauren on 11/30/17. She has the following specific operative considerations:   - RCRI : No serious cardiac risks.    - VTE risk: 0.26%  - MEKA # of risks 1/8 = Low risk  - Risk of PONV score = 3.  If > 2, anti-emetic intervention recommended. If 3 or > anti emetic intervention recommended with two or more meds    Last procedures for cataracts and glaucoma OSH. Reports use of Ketamine and was very ill.      --Right ureteral stones. Above procedure now planned. Oxycodone used sparingly for flank pain. Cr 1.05.   --PONV. Significant history and ongoing nausea. Final decisions regarding prophylaxis by Anesthesia on DOS.   --Generally healthy female with no significant cardiopulmonary history. Nonsmoker. Good activity tolerance. EKG above.   --Anxiety. Lexapro at HS. Buspar prn.       Patient was discussed with Dr Mora.      Reviewed and Signed by PAC Mid-Level Provider/Resident  Mid-Level Provider/Resident: DARRIN Alicea, CNS  Date: 11/281/7  Time: 3:59pm    Attending Anesthesiologist Anesthesia Assessment:  52 year old for cysto, ureteroscopy for ureteral stone. Chart reviewed, patient seen and evaluated; agree with above assessment. Patient has no significant cardiac or pulmonary disease. Apparently patient had minor eye procedure, was given oral ketamine, hated it mostly due to headache and PONV. I have promised her essentially TIVA as she is quite upset over the last event and the significant PONV.     Patient is appropriate for the planned procedure without further workup or medical management change. The final anesthesia plan will be determined by the physician anesthesiologist caring for the patient on the day of surgery.    Reviewed and Signed by PAC Anesthesiologist  Anesthesiologist: elav  Date: 11/28/2017  Time:   Pass/Fail: Pass  Disposition:     PAC Pharmacist Assessment:        Pharmacist:   Date:   Time:      Anesthesia  Plan      History & Physical Review  History and physical reviewed and following examination; no interval change.    ASA Status:  2 .        Plan for General and LMA with Intravenous and Propofol induction. Maintenance will be Balanced.    PONV prophylaxis:  Ondansetron (or other 5HT-3) and Dexamethasone or Solumedrol  Additional equipment: Videolaryngoscope      Postoperative Care  Postoperative pain management:  IV analgesics and Oral pain medications.      Consents  Anesthetic plan, risks, benefits and alternatives discussed with:  Patient.  Use of blood products discussed: Yes.   .                          .

## 2017-11-28 NOTE — H&P
Pre-Operative H & P     CC:  Preoperative exam to assess for increased cardiopulmonary risk while undergoing surgery and anesthesia.    Date of Encounter: 2017  Primary Care Physician:  Janey Aguirre  Iliana Mcfarlane is a 52 year old female who presents for pre-operative H & P in preparation for cystoscopy, right ureteroscopy, laser lithotripsy, stent placement with Dr. Lauren on 17 at [facility]. History is obtained from the patient.     Patient who presented to her PCP with complaints of right flank and abdominal pain. She had also been having nausea and vomiting and some ankle swelling for a few days. She was referred for CT scan which revealed right ureteral stones. She has consulted with Dr. Marie with above procedure now planned.     Ms Mcfarlane presents today with persistent pain in her right flank with radiation and cramping in her lower abdomen. She continues to be nauseated but has not vomited. She is taking Oxycodone sparingly.     Past Medical History  Past Medical History:   Diagnosis Date     Allergic rhinitis late      Allergic rhinitis, cause unspecified      Anxiety      Depression, major      Depressive disorder      Esophageal reflux      Genital herpes      Glaucoma      Hoarseness late      Lumbago      Menarche age 16     Menorrhagia      Nephrolithiasis      PONV (postoperative nausea and vomiting)      Reduced vision        Past Surgical History  Past Surgical History:   Procedure Laterality Date     C NONSPECIFIC PROCEDURE      Upper GI endoscopy     C NONSPECIFIC PROCEDURE      Tonsillectomy     C NONSPECIFIC PROCEDURE  85    Oral surgery, wisdom teeth     C NONSPECIFIC PROCEDURE           C NONSPECIFIC PROCEDURE      Hysteroscopy, ablation of uterine fibroid     C NONSPECIFIC PROCEDURE      miscarriage     CATARACT IOL, RT/LT       ENT SURGERY      tonsillectomy     EYE SURGERY       trabeculectomy     GI SURGERY      dairy intolerence - resolved     HEAD & NECK SURGERY      trabeculectomy, cataract, yag     OPERATIVE HYSTEROSCOPY WITH MORCELLATOR  7/25/2014    Procedure: OPERATIVE HYSTEROSCOPY WITH MORCELLATOR;  Surgeon: Maureen Tavarez MD;  Location: [facility] OR     SURGICAL HISTORY OF -   12/11    right eye trabeculectomy     TONSILLECTOMY  1987       Hx of Blood transfusions/reactions: Denies.      Hx of abnormal bleeding or anti-platelet use: Denies.     Menstrual history: Patient's last menstrual period was 11/24/2017.    Steroid use in the last year: Denies.     Personal or FH with difficulty with Anesthesia:  Recent cataract and glaucoma surgery with use of Ketamine and severe PONV.    Prior to Admission Medications  Current Outpatient Prescriptions   Medication Sig Dispense Refill     ibuprofen 100 MG TABS Take 500 mg by mouth as needed       oxyCODONE-acetaminophen (PERCOCET) 5-325 MG per tablet Take 1 tablet by mouth every 4 hours as needed for moderate to severe pain 18 tablet 0     ondansetron (ZOFRAN ODT) 4 MG ODT tab Take 1-2 tablets (4-8 mg) by mouth 3 times daily (before meals) 20 tablet 1     tamsulosin (FLOMAX) 0.4 MG capsule Take 1 capsule (0.4 mg) by mouth daily (Patient taking differently: Take 0.4 mg by mouth every morning ) 30 capsule 0     escitalopram (LEXAPRO) 20 MG tablet Take 1 tablet (20 mg) by mouth daily (Patient taking differently: Take 20 mg by mouth every evening ) 90 tablet PRN     norethindrone-ethinyl estradiol (JUNEL FE 1/20) 1-20 MG-MCG per tablet Take 1 tablet by mouth daily (Patient taking differently: Take 1 tablet by mouth every evening ) 84 tablet PRN     traZODone (DESYREL) 50 MG tablet Take 2 tablets (100 mg) by mouth nightly as needed for sleep 180 tablet PRN     zolpidem (AMBIEN CR) 6.25 MG CR tablet Take 1 tablet (6.25 mg) by mouth nightly as needed for sleep 90 tablet 1     busPIRone (BUSPAR) 15 MG tablet Take 1 tablet (15 mg) by mouth 3 times daily  (Patient taking differently: Take 15 mg by mouth as needed ) 270 tablet 3     Lifitegrast (XIIDRA) 5 % SOLN Apply 1 drop to eye 2 times daily        clindamycin (CLEOCIN) 150 MG capsule Open one capsule and place in 1 Litre of Normal Saline and mix. Irrigate with 20 cc each nostril QID. (Patient taking differently: as needed Open one capsule and place in 1 Litre of Normal Saline and mix. Irrigate with 20 cc each nostril QID.) 10 capsule 3     sodium chloride 0.9 % SOLN Irrigate 20 cc each nostril QID X 2 month supply (Patient taking differently: as needed Irrigate 20 cc each nostril QID X 2 month supply) 89488 mL 3     Cyanocobalamin (VITAMIN B 12 PO) Take 1 capsule by mouth every evening        Omega-3 Fatty Acids (OMEGA-3 FISH OIL PO) Take 4 capsules by mouth every evening        CALCIUM-MAGNESIUM-ZINC PO Take 1 tablet by mouth every evening        VITAMIN D, CHOLECALCIFEROL, PO Take by mouth every evening        VITAMIN K PO        Beclomethasone Dipropionate (QNASL) 80 MCG/ACT AERS Nasal Spray Spray 2 sprays into both nostrils daily (Patient not taking: Reported on 11/15/2017) 8.7 g 3     sodium chloride 0.9%, bottle, 0.9 % irrigation IRRIGATE 20 ML EACH NOSTRIL 4 TIMES DAILY FOR 2 MONTHS. (Patient not taking: Reported on 11/15/2017) 1000 mL 3     loratadine (CLARITIN) 10 MG tablet Take 10 mg by mouth as needed Reported on 3/22/2017         Allergies  Allergies   Allergen Reactions     No Known Drug Allergies        Social History  Social History     Social History     Marital status:      Spouse name: N/A     Number of children: N/A     Years of education: N/A     Occupational History     Not on file.     Social History Main Topics     Smoking status: Never Smoker     Smokeless tobacco: Never Used     Alcohol use 0.0 oz/week      Comment: minimal     Drug use: No     Sexual activity: Yes     Partners: Male     Birth control/ protection: Pill     Other Topics Concern     Parent/Sibling W/ Cabg, Mi Or  "Angioplasty Before 65f 55m? No     Social History Narrative    Dairy/d 2-3 servings/d.     Caffeine 1-2 servings/d    Exercise 0 x week but chasing kids    Sunscreen used - Yes    Seatbelts used - Yes    Working smoke/CO detectors in the home - Yes    Guns stored in the home - No    Self Breast Exams - No    Self Testicular Exam - NA    Eye Exam up to date - Yes    Dental Exam up to date - Yes    Pap Smear up to date - no    Mammogram up to date - No due to nursing    PSA up to date - NA    Dexa Scan up to date - NA    Flex Sig / Colonoscopy up to date - NA    Immunizations up to date -yes TD 2006    Abuse: Current or Past(Physical, Sexual or Emotional)- No    Do you feel safe in your environment - Yes       Family History  Family History   Problem Relation Age of Onset     Hypertension Mother      Obesity Mother      Hyperlipidemia Mother      DIABETES Paternal Grandmother      CEREBROVASCULAR DISEASE Paternal Grandmother      Alcohol/Drug Paternal Grandfather      CANCER Paternal Grandfather      Substance Abuse Paternal Grandfather      HEART DISEASE Maternal Grandmother      Alzheimer Disease Maternal Grandmother      not until mid-90 yrs     Breast Cancer Maternal Aunt      CANCER Maternal Aunt      brain     CANCER Father      Other Cancer Father      CANCER Maternal Grandfather        Review of Systems  The complete review of systems is negative other than noted in the HPI or here.   Constitutional: Denies fever, chills, weight loss.  HEENT: Wears glasses for vision. Denies swallowing difficulty.  Respiratory: Denies cough or shortness of breath. Denies concern for MEKA.  CV: Denies chest pain or irregular HR. Good exercise tolerance. 3 days of ankle swelling at night that resolved.  GI: Persistent flank pain as above. Denies bowel issues. Frequent nausea. No recent vomiting.  : Denies dysuria.    Temp: 98.4  F (36.9  C) Temp src: Oral BP: 137/88 Pulse: 89   Resp: 16 SpO2: 98 %         152 lbs 8 oz  5' 4\"   " Body mass index is 26.18 kg/(m^2).       Physical Exam  Constitutional: Awake, alert, cooperative, no apparent distress, and appears stated age.  Eyes: Pupils equal, round and reactive to light, extra ocular muscles intact, sclera clear, conjunctiva normal.  HENT: Normocephalic, oral pharynx with moist mucus membranes, good dentition. No goiter appreciated.   Respiratory: Clear to auscultation bilaterally, no crackles or wheezing. No cough or obvious dyspnea.  Cardiovascular: Regular rate and rhythm, normal S1 and S2, and no murmur noted. Carotids, no bruits. No edema. Palpable pulses to radial  DP and PT arteries.   GI: Normal bowel sounds, soft, non-distended, non-tender, no masses palpated. No deep palpation due to pain.  Lymph/Hematologic: No cervical lymphadenopathy and no supraclavicular lymphadenopathy.  Genitourinary:  Deferred.   Skin: Warm and dry.     Musculoskeletal: Full ROM of neck. There is no redness, warmth, or swelling of the joints. Gross motor strength is normal.    Neurologic: Awake, alert, oriented to name, place and time. Cranial nerves II-XII are grossly intact. Gait is normal.   Neuropsychiatric: Calm, cooperative. Normal affect.     Labs: (personally reviewed)  Lab Results   Component Value Date    WBC 5.7 11/28/2017     Lab Results   Component Value Date    RBC 4.00 11/28/2017     Lab Results   Component Value Date    HGB 12.5 11/28/2017     Lab Results   Component Value Date    HCT 38.6 11/28/2017     Lab Results   Component Value Date    MCV 97 11/28/2017     Lab Results   Component Value Date    MCH 31.3 11/28/2017     Lab Results   Component Value Date    MCHC 32.4 11/28/2017     Lab Results   Component Value Date    RDW 12.4 11/28/2017     Lab Results   Component Value Date     11/28/2017     Last Basic Metabolic Panel:  Lab Results   Component Value Date     11/28/2017      Lab Results   Component Value Date    POTASSIUM 3.4 11/28/2017     Lab Results   Component Value  Date    CHLORIDE 103 11/28/2017     Lab Results   Component Value Date    ONESIMO 9.0 11/28/2017     Lab Results   Component Value Date    CO2 27 11/28/2017     Lab Results   Component Value Date    BUN 12 11/28/2017     Lab Results   Component Value Date    CR 1.05 11/28/2017     Lab Results   Component Value Date     11/28/2017     EKG: Personally reviewed 1/9/17 Sinus rhythm    11/24/17 CT A/P    IMPRESSION:   1. 6 mm obstructing ureteral ureteral stone with mild associated  hydroureteronephrosis.  2. Extensive colonic diverticulosis without CT evidence of acute  diverticulitis.  3. Cholelithiasis without CT evidence of acute cholecystitis.  Additional hyperdense material within the gallbladder fundus, could be  sludge within the gallbladder fundus versus focal fundal  adenomyomatosis.  Right upper quadrant ultrasound could be considered  if clinically indicated.    ASSESSMENT and PLAN  Iliana Mcfarlane is a 52 year old female scheduled to undergo cystoscopy, right ureteroscopy, laser lithotripsy, stent placement with Dr. Lauren on 11/30/17. She has the following specific operative considerations:   - RCRI : No serious cardiac risks.    - Anesthesia considerations:  Refer to PAC assessment in anesthesia records  - VTE risk: 0.26%  - MEKA # of risks 1/8 = Low risk  - Risk of PONV score = 3.  If > 2, anti-emetic intervention recommended. If 3 or > anti emetic intervention recommended with two or more meds     --Right ureteral stones. Above procedure now planned. Oxycodone used sparingly for flank pain. Cr 1.05.   --PONV. Significant history and ongoing nausea. Final decisions regarding prophylaxis by Anesthesia on DOS.   --Generally healthy female with no significant cardiopulmonary history. Nonsmoker. Good activity tolerance. EKG above.   --Anxiety. Lexapro at HS. Buspar prn.   Arrival time, NPO, shower and medication instructions provided by nursing staff today. Preparing For Your Surgery handout  given.    Patient was discussed with Dr Mora.    DARRIN Mcleod CNS  Preoperative Assessment Center  [facility]  Phone: [phone]  Fax: [phone]

## 2017-11-28 NOTE — PROGRESS NOTES
Spoke to patient over telephone regarding 6 mm right ureteral stone which she has been trying to pass for last 2 weeks.  Offered outpatient evaluation to discuss treatment options but she is confident she would like treatment.  Reviewed treatment options over telephone including ESWL and ureteroscopy.  She is interested in more definitive procedure and has chosed ureteroscopy.  Will coordinate plans for this treatment this week.  Recent labs, UA, symptoms unremarkable for infection.  Advised motrin, oxycodone, zofran and dramamine for symptoms and ER or clinic evaluation if pain becomes worse or symptoms change.  We discussed the risks and benefits including bleeding, infection, incomplete stone removal, damage to adjacent organs, and need for staged procedures.        Helio Lauren

## 2017-11-29 ENCOUNTER — TELEPHONE (OUTPATIENT)
Dept: UROLOGY | Facility: CLINIC | Age: 52
End: 2017-11-29

## 2017-11-29 LAB
BACTERIA SPEC CULT: NORMAL
Lab: NORMAL
SPECIMEN SOURCE: NORMAL

## 2017-11-29 NOTE — TELEPHONE ENCOUNTER
Left voice mail with patient to call back to confirm time change for surgery with Dr Lauren from 2 pm to 12 pm with a check in time of 10 am.

## 2017-11-30 ENCOUNTER — ANESTHESIA (OUTPATIENT)
Dept: SURGERY | Facility: CLINIC | Age: 52
End: 2017-11-30
Payer: COMMERCIAL

## 2017-11-30 ENCOUNTER — APPOINTMENT (OUTPATIENT)
Dept: GENERAL RADIOLOGY | Facility: CLINIC | Age: 52
End: 2017-11-30
Attending: UROLOGY
Payer: COMMERCIAL

## 2017-11-30 ENCOUNTER — HOSPITAL ENCOUNTER (OUTPATIENT)
Facility: CLINIC | Age: 52
Discharge: HOME | End: 2017-11-30
Attending: UROLOGY | Admitting: UROLOGY
Payer: COMMERCIAL

## 2017-11-30 VITALS
SYSTOLIC BLOOD PRESSURE: 151 MMHG | HEART RATE: 82 BPM | RESPIRATION RATE: 14 BRPM | WEIGHT: 152.78 LBS | BODY MASS INDEX: 26.08 KG/M2 | TEMPERATURE: 98.1 F | OXYGEN SATURATION: 100 % | HEIGHT: 64 IN | DIASTOLIC BLOOD PRESSURE: 98 MMHG

## 2017-11-30 DIAGNOSIS — N20.0 KIDNEY STONE: Primary | ICD-10-CM

## 2017-11-30 LAB
GLUCOSE BLDC GLUCOMTR-MCNC: 86 MG/DL (ref 70–99)
HCG UR QL: NEGATIVE

## 2017-11-30 PROCEDURE — 37000008 ZZH ANESTHESIA TECHNICAL FEE, 1ST 30 MIN: Performed by: UROLOGY

## 2017-11-30 PROCEDURE — 25000128 H RX IP 250 OP 636: Performed by: NURSE ANESTHETIST, CERTIFIED REGISTERED

## 2017-11-30 PROCEDURE — 40000278 XR SURGERY CARM FLUORO LESS THAN 5 MIN: Mod: TC

## 2017-11-30 PROCEDURE — 81025 URINE PREGNANCY TEST: CPT | Performed by: ANESTHESIOLOGY

## 2017-11-30 PROCEDURE — 27210794 ZZH OR GENERAL SUPPLY STERILE: Performed by: UROLOGY

## 2017-11-30 PROCEDURE — C1769 GUIDE WIRE: HCPCS | Performed by: UROLOGY

## 2017-11-30 PROCEDURE — 82365 CALCULUS SPECTROSCOPY: CPT | Performed by: UROLOGY

## 2017-11-30 PROCEDURE — 36000061 ZZH SURGERY LEVEL 3 W FLUORO 1ST 30 MIN - UMMC: Performed by: UROLOGY

## 2017-11-30 PROCEDURE — 82962 GLUCOSE BLOOD TEST: CPT

## 2017-11-30 PROCEDURE — 25500064 ZZH RX 255 OP 636: Performed by: UROLOGY

## 2017-11-30 PROCEDURE — 40000170 ZZH STATISTIC PRE-PROCEDURE ASSESSMENT II: Performed by: UROLOGY

## 2017-11-30 PROCEDURE — 25000128 H RX IP 250 OP 636: Performed by: UROLOGY

## 2017-11-30 PROCEDURE — 36000059 ZZH SURGERY LEVEL 3 EA 15 ADDTL MIN UMMC: Performed by: UROLOGY

## 2017-11-30 PROCEDURE — 25000125 ZZHC RX 250: Performed by: NURSE ANESTHETIST, CERTIFIED REGISTERED

## 2017-11-30 PROCEDURE — 71000027 ZZH RECOVERY PHASE 2 EACH 15 MINS: Performed by: UROLOGY

## 2017-11-30 PROCEDURE — C2617 STENT, NON-COR, TEM W/O DEL: HCPCS | Performed by: UROLOGY

## 2017-11-30 PROCEDURE — 71000014 ZZH RECOVERY PHASE 1 LEVEL 2 FIRST HR: Performed by: UROLOGY

## 2017-11-30 PROCEDURE — C9399 UNCLASSIFIED DRUGS OR BIOLOG: HCPCS | Performed by: NURSE ANESTHETIST, CERTIFIED REGISTERED

## 2017-11-30 PROCEDURE — 25000128 H RX IP 250 OP 636: Performed by: ANESTHESIOLOGY

## 2017-11-30 PROCEDURE — 25000566 ZZH SEVOFLURANE, EA 15 MIN: Performed by: UROLOGY

## 2017-11-30 PROCEDURE — 37000009 ZZH ANESTHESIA TECHNICAL FEE, EACH ADDTL 15 MIN: Performed by: UROLOGY

## 2017-11-30 PROCEDURE — C1894 INTRO/SHEATH, NON-LASER: HCPCS | Performed by: UROLOGY

## 2017-11-30 DEVICE — STENT URETERAL PERCUFLEX PLUS 6FRX24CM M0061752620: Type: IMPLANTABLE DEVICE | Site: URETER | Status: FUNCTIONAL

## 2017-11-30 RX ORDER — SODIUM CHLORIDE, SODIUM LACTATE, POTASSIUM CHLORIDE, CALCIUM CHLORIDE 600; 310; 30; 20 MG/100ML; MG/100ML; MG/100ML; MG/100ML
INJECTION, SOLUTION INTRAVENOUS CONTINUOUS
Status: DISCONTINUED | OUTPATIENT
Start: 2017-11-30 | End: 2017-11-30 | Stop reason: HOSPADM

## 2017-11-30 RX ORDER — FENTANYL CITRATE 50 UG/ML
INJECTION, SOLUTION INTRAMUSCULAR; INTRAVENOUS PRN
Status: DISCONTINUED | OUTPATIENT
Start: 2017-11-30 | End: 2017-11-30

## 2017-11-30 RX ORDER — PHENAZOPYRIDINE HYDROCHLORIDE 200 MG/1
200 TABLET, FILM COATED ORAL 3 TIMES DAILY PRN
Qty: 9 TABLET | Refills: 0 | Status: SHIPPED | OUTPATIENT
Start: 2017-11-30 | End: 2017-12-08

## 2017-11-30 RX ORDER — FENTANYL CITRATE 50 UG/ML
25-50 INJECTION, SOLUTION INTRAMUSCULAR; INTRAVENOUS
Status: DISCONTINUED | OUTPATIENT
Start: 2017-11-30 | End: 2017-11-30 | Stop reason: HOSPADM

## 2017-11-30 RX ORDER — PROPOFOL 10 MG/ML
INJECTION, EMULSION INTRAVENOUS CONTINUOUS PRN
Status: DISCONTINUED | OUTPATIENT
Start: 2017-11-30 | End: 2017-11-30

## 2017-11-30 RX ORDER — LIDOCAINE HYDROCHLORIDE 20 MG/ML
INJECTION, SOLUTION INFILTRATION; PERINEURAL PRN
Status: DISCONTINUED | OUTPATIENT
Start: 2017-11-30 | End: 2017-11-30

## 2017-11-30 RX ORDER — TAMSULOSIN HYDROCHLORIDE 0.4 MG/1
0.4 CAPSULE ORAL DAILY
Qty: 7 CAPSULE | Refills: 0 | Status: SHIPPED | OUTPATIENT
Start: 2017-11-30 | End: 2017-12-08

## 2017-11-30 RX ORDER — OXYBUTYNIN CHLORIDE 5 MG/1
5 TABLET ORAL 2 TIMES DAILY
Qty: 10 TABLET | Refills: 0 | Status: SHIPPED | OUTPATIENT
Start: 2017-11-30 | End: 2017-12-08

## 2017-11-30 RX ORDER — NITROFURANTOIN 25; 75 MG/1; MG/1
100 CAPSULE ORAL 2 TIMES DAILY
Qty: 10 CAPSULE | Refills: 0 | Status: SHIPPED | OUTPATIENT
Start: 2017-11-30 | End: 2017-12-08

## 2017-11-30 RX ORDER — ONDANSETRON 2 MG/ML
INJECTION INTRAMUSCULAR; INTRAVENOUS PRN
Status: DISCONTINUED | OUTPATIENT
Start: 2017-11-30 | End: 2017-11-30

## 2017-11-30 RX ORDER — DEXAMETHASONE SODIUM PHOSPHATE 4 MG/ML
4 INJECTION, SOLUTION INTRA-ARTICULAR; INTRALESIONAL; INTRAMUSCULAR; INTRAVENOUS; SOFT TISSUE
Status: DISCONTINUED | OUTPATIENT
Start: 2017-11-30 | End: 2017-11-30 | Stop reason: HOSPADM

## 2017-11-30 RX ORDER — DEXAMETHASONE SODIUM PHOSPHATE 4 MG/ML
INJECTION, SOLUTION INTRA-ARTICULAR; INTRALESIONAL; INTRAMUSCULAR; INTRAVENOUS; SOFT TISSUE PRN
Status: DISCONTINUED | OUTPATIENT
Start: 2017-11-30 | End: 2017-11-30

## 2017-11-30 RX ORDER — ONDANSETRON 2 MG/ML
4 INJECTION INTRAMUSCULAR; INTRAVENOUS EVERY 30 MIN PRN
Status: DISCONTINUED | OUTPATIENT
Start: 2017-11-30 | End: 2017-11-30 | Stop reason: HOSPADM

## 2017-11-30 RX ORDER — CEFAZOLIN SODIUM 1 G/3ML
1 INJECTION, POWDER, FOR SOLUTION INTRAMUSCULAR; INTRAVENOUS SEE ADMIN INSTRUCTIONS
Status: DISCONTINUED | OUTPATIENT
Start: 2017-11-30 | End: 2017-11-30 | Stop reason: HOSPADM

## 2017-11-30 RX ORDER — PROPOFOL 10 MG/ML
INJECTION, EMULSION INTRAVENOUS PRN
Status: DISCONTINUED | OUTPATIENT
Start: 2017-11-30 | End: 2017-11-30

## 2017-11-30 RX ORDER — MEPERIDINE HYDROCHLORIDE 25 MG/ML
12.5 INJECTION INTRAMUSCULAR; INTRAVENOUS; SUBCUTANEOUS EVERY 5 MIN PRN
Status: DISCONTINUED | OUTPATIENT
Start: 2017-11-30 | End: 2017-11-30 | Stop reason: HOSPADM

## 2017-11-30 RX ORDER — CEFAZOLIN SODIUM 2 G/100ML
2 INJECTION, SOLUTION INTRAVENOUS
Status: COMPLETED | OUTPATIENT
Start: 2017-11-30 | End: 2017-11-30

## 2017-11-30 RX ORDER — OXYCODONE HYDROCHLORIDE 5 MG/1
5 TABLET ORAL EVERY 4 HOURS PRN
Qty: 10 TABLET | Refills: 0 | Status: SHIPPED | OUTPATIENT
Start: 2017-11-30 | End: 2017-12-08

## 2017-11-30 RX ORDER — ONDANSETRON 4 MG/1
4 TABLET, ORALLY DISINTEGRATING ORAL EVERY 30 MIN PRN
Status: DISCONTINUED | OUTPATIENT
Start: 2017-11-30 | End: 2017-11-30 | Stop reason: HOSPADM

## 2017-11-30 RX ADMIN — PHENYLEPHRINE HYDROCHLORIDE 100 MCG: 10 INJECTION, SOLUTION INTRAMUSCULAR; INTRAVENOUS; SUBCUTANEOUS at 13:59

## 2017-11-30 RX ADMIN — PROPOFOL 50 MCG/KG/MIN: 10 INJECTION, EMULSION INTRAVENOUS at 13:14

## 2017-11-30 RX ADMIN — FENTANYL CITRATE 100 MCG: 50 INJECTION, SOLUTION INTRAMUSCULAR; INTRAVENOUS at 13:07

## 2017-11-30 RX ADMIN — Medication 40 MG: at 13:07

## 2017-11-30 RX ADMIN — CEFAZOLIN SODIUM 2 G: 2 INJECTION, SOLUTION INTRAVENOUS at 13:11

## 2017-11-30 RX ADMIN — LIDOCAINE HYDROCHLORIDE 80 MG: 20 INJECTION, SOLUTION INFILTRATION; PERINEURAL at 13:07

## 2017-11-30 RX ADMIN — PROPOFOL 150 MG: 10 INJECTION, EMULSION INTRAVENOUS at 13:07

## 2017-11-30 RX ADMIN — ONDANSETRON 4 MG: 2 INJECTION INTRAMUSCULAR; INTRAVENOUS at 14:01

## 2017-11-30 RX ADMIN — FENTANYL CITRATE 50 MCG: 50 INJECTION, SOLUTION INTRAMUSCULAR; INTRAVENOUS at 12:55

## 2017-11-30 RX ADMIN — SUGAMMADEX 180 MG: 100 INJECTION, SOLUTION INTRAVENOUS at 14:04

## 2017-11-30 RX ADMIN — ONDANSETRON 4 MG: 2 INJECTION INTRAMUSCULAR; INTRAVENOUS at 14:34

## 2017-11-30 RX ADMIN — PHENYLEPHRINE HYDROCHLORIDE 100 MCG: 10 INJECTION, SOLUTION INTRAMUSCULAR; INTRAVENOUS; SUBCUTANEOUS at 13:37

## 2017-11-30 RX ADMIN — MIDAZOLAM HYDROCHLORIDE 2 MG: 1 INJECTION, SOLUTION INTRAMUSCULAR; INTRAVENOUS at 12:47

## 2017-11-30 RX ADMIN — SODIUM CHLORIDE, POTASSIUM CHLORIDE, SODIUM LACTATE AND CALCIUM CHLORIDE: 600; 310; 30; 20 INJECTION, SOLUTION INTRAVENOUS at 12:47

## 2017-11-30 RX ADMIN — DEXAMETHASONE SODIUM PHOSPHATE 4 MG: 4 INJECTION, SOLUTION INTRAMUSCULAR; INTRAVENOUS at 13:22

## 2017-11-30 NOTE — IP AVS SNAPSHOT
MRN:[id number]                      After Visit Summary   11/30/2017    Iliana Mcfarlane    MRN: [id number]           Thank you!     Thank you for choosing [facility] for your care. Our goal is always to provide you with excellent care. Hearing back from our patients is one way we can continue to improve our services. Please take a few minutes to complete the written survey that you may receive in the mail after you visit with us. Thank you!        Patient Information     Date Of Birth          1965        About your hospital stay     You were admitted on:  November 30, 2017 You last received care in the:   PACU    You were discharged on:  November 30, 2017       Who to Call     For medical emergencies, please call 911.  For non-urgent questions about your medical care, please call your primary care provider or clinic, [phone]  For questions related to your surgery, please call your surgery clinic        Attending Provider     Provider Helio Perez MD Urology       Primary Care Provider Office Phone # Fax #    Janey Aguirre -[phone]      After Care Instructions     Discharge Instructions       Activity  - Do not strain with bowel movements.  - Do not drive until you can press the brake pedal quickly and fully without pain.   - Do not operate a motor vehicle while taking narcotic pain medications.     Stents  - You have right  ureteral stent in place. Stents can cause some discomfort, including some blood in your urine (which is normal), the feeling or urge to urinate frequently, burning with urination and pressure/discomfort in your back while voiding. Stay well hydrated to keep your urine as clear as possible.  - remove the stent after 5 days, with your last pill of antibiotics     Medications  - Flomax - to decrease stent discomfort   - Transition from narcotic pain medications to tylenol (acetaminophen) as you are able.  Wean yourself off  all pain medications as you are able.  - Some pain medications contain both tylenol (acetaminophen) and a narcotic (Norco, vicodin, percocet), do not take more than 4,000mg of Tylenol (acetaminophen) from all sources in any 24 hour period.  - Narcotics can make you constipated.  Take over the counter fiber (metamucil or benefiber) and stool softeners (miralax, docusate or senna) while taking narcotic pain medications, but stop if you develop diarrhea.  - No driving or operating machinery while taking narcotic pain medications                  Further instructions from your care team       Discharge Instructions: Following a Cystoscopy/Stent and/or Ureteroscopy    Drainage:    Urine may be slightly bloody but should taper off in a few days.    You may have some frequency and urgency for a few days.    Comfort:    A burning sensation when urinating is common. Drinking extra fluids helps decrease this burning feeling and also helps to clear the bloody urine.    Mild abdominal cramps may occur for a few days.    Baths:    Daily tub baths aide in passing urine.    Frequent use of bubble bath is discouraged since it encourages urinary tract infections.    Report to your doctor at once if you experience:    Inability to pass your urine    Continuous or heavy bleeding    Severe Pain    Elevated temperature > than 101.5 for 24 hours    Home Activity:    The day of surgery spend a quiet evening at home.    Increase activity as tolerated.    Rev. 5/12  Same-Day Surgery   Adult Discharge Orders & Instructions     For 24 hours after surgery:  1. Get plenty of rest.  A responsible adult must stay with you for at least 24 hours after you leave the hospital.   2. Pain medication can slow your reflexes. Do not drive or use heavy equipment.  If you have weakness or tingling, don't drive or use heavy equipment until this feeling goes away.  3. Mixing alcohol and pain medication can cause dizziness and slow your breathing. It can even  be fatal. Do not drink alcohol while taking pain medication.  4. Avoid strenuous or risky activities.  Ask for help when climbing stairs.   5. You may feel lightheaded.  If so, sit for a few minutes before standing.  Have someone help you get up.   6. If you have nausea (feel sick to your stomach), drink only clear liquids such as apple juice, ginger ale, broth or 7-Up.  Rest may also help.  Be sure to drink enough fluids.  Move to a regular diet as you feel able. Take pain medications with a small amount of solid food, such as toast or crackers, to avoid nausea.   7. A slight fever is normal. Call the doctor if your fever is over 100 F (37.7 C) (taken under the tongue) or lasts longer than 24 hours.  8. You may have a dry mouth, muscle aches, trouble sleeping or a sore throat.  These symptoms should go away after 24 hours.  9. Do not make important or legal decisions.   Pain Management:      1. Take pain medication (if prescribed) for pain as directed by your physician.        2. WARNING: If the pain medication you have been prescribed contains Tylenol  (acetaminophen), DO NOT take additional doses of Tylenol (acetaminophen).     Call your doctor for any of the followin.  Signs of infection (fever, growing tenderness at the surgery site, severe pain, a large amount of drainage or bleeding, foul-smelling drainage, redness, swelling).    2.  It has been over 8 to 10 hours since surgery and you are still not able to urinate (pee).    3.  Headache for over 24 hours.    4.  Numbness, tingling or weakness the day after surgery (if you had spinal anesthesia).  To contact a doctor, call :      [phone] and ask for the Resident On Call for:         Urology, Dr. Lauren (answered 24 hours a day)      Emergency Department:  [facility]: [phone]  [facility]: [phone]               Rev. 10/2014   If you use hormonal birth control (such as the pill, patch, ring or  "implants):  You will need a second form of birth control for 7 days (condoms, a diaphragm or contraceptive foam).  While in the surgery center, you received a medicine called Sugammadex.  Hormonal birth control (such as the pill, patch, ring or implants) will not work as well for a week after taking this medicine.        Additional Information     If you use hormonal birth control (such as the pill, patch, ring or implants): You'll need a second form of birth control for 7 days (condoms, a diaphragm or contraceptive foam). While in the hospital, you received a medicine called Bridion. Your normal birth control will not work as well for a week after taking this medicine.          Pending Results     Date and Time Order Name Status Description    11/30/2017 1359 Stone analysis In process             Admission Information     Date & Time Provider Department Dept. Phone    11/30/2017 Helio Lauren MD  PACU [phone]      Your Vitals Were     Blood Pressure Pulse Temperature Respirations Height Weight    157/104 82 98.1  F (36.7  C) (Oral) 10 1.626 m (5' 4\") 69.3 kg (152 lb 12.5 oz)    Last Period Pulse Oximetry BMI (Body Mass Index)             11/24/2017 100% 26.22 kg/m2         [facility] [employer] Information     [facility] gives you secure access to your electronic health record. If you see a primary care provider, you can also send messages to your care team and make appointments. If you have questions, please call your primary care clinic.  If you do not have a primary care provider, please call [phone] and they will assist you.        Care EveryWhere ID     This is your Care EveryWhere ID. This could be used by other organizations to access your [facility] medical records  [id number]        Equal Access to Services     JOHN GONZALEZ : rosette Peck, jovany aburto. So [facility] [phone].    ATENCIÓN: Si habla español, tiene a case " disposición servicios gratuitos de asistencia lingüística. Vargas powell [phone].    We comply with applicable federal civil rights laws and [place] laws. We do not discriminate on the basis of race, color, national origin, age, disability, sex, sexual orientation, or gender identity.               Review of your medicines      START taking        Dose / Directions    nitroFURantoin (macrocrystal-monohydrate) 100 MG capsule   Commonly known as:  MACROBID   Used for:  Kidney stone        Dose:  100 mg   Take 1 capsule (100 mg) by mouth 2 times daily   Quantity:  10 capsule   Refills:  0       oxybutynin 5 MG tablet   Commonly known as:  DITROPAN   Used for:  Kidney stone        Dose:  5 mg   Take 1 tablet (5 mg) by mouth 2 times daily   Quantity:  10 tablet   Refills:  0       oxyCODONE IR 5 MG tablet   Commonly known as:  ROXICODONE   Used for:  Kidney stone        Dose:  5 mg   Take 1 tablet (5 mg) by mouth every 4 hours as needed for pain   Quantity:  10 tablet   Refills:  0       phenazopyridine 200 MG tablet   Commonly known as:  PYRIDIUM   Used for:  Kidney stone        Dose:  200 mg   Take 1 tablet (200 mg) by mouth 3 times daily as needed for irritation   Quantity:  9 tablet   Refills:  0         CONTINUE these medicines which may have CHANGED, or have new prescriptions. If we are uncertain of the size of tablets/capsules you have at home, strength may be listed as something that might have changed.        Dose / Directions    busPIRone 15 MG tablet   Commonly known as:  BUSPAR   This may have changed:    - when to take this  - reasons to take this   Used for:  Anxiety        Dose:  15 mg   Take 1 tablet (15 mg) by mouth 3 times daily   Quantity:  270 tablet   Refills:  3       clindamycin 150 MG capsule   Commonly known as:  CLEOCIN   This may have changed:    - when to take this  - reasons to take this  - additional instructions   Used for:  Chronic maxillary sinusitis, Chronic rhinitis, Nasal turbinate  hypertrophy, Deviated nasal septum, Uncomplicated asthma, unspecified asthma severity        Open one capsule and place in 1 Litre of Normal Saline and mix. Irrigate with 20 cc each nostril QID.   Quantity:  10 capsule   Refills:  3       escitalopram 20 MG tablet   Commonly known as:  LEXAPRO   This may have changed:  when to take this   Used for:  Anxiety        Dose:  20 mg   Take 1 tablet (20 mg) by mouth daily   Quantity:  90 tablet   Refills:  PRN       norethindrone-ethinyl estradiol 1-20 MG-MCG per tablet   Commonly known as:  JUNEL FE 1/20   This may have changed:  when to take this   Used for:  Encounter for surveillance of contraceptive pills        Dose:  1 tablet   Take 1 tablet by mouth daily   Quantity:  84 tablet   Refills:  PRN       sodium chloride 0.9 % Soln   This may have changed:    - when to take this  - reasons to take this  - additional instructions   Used for:  Chronic maxillary sinusitis, Chronic rhinitis, Nasal turbinate hypertrophy, Deviated nasal septum, Uncomplicated asthma, unspecified asthma severity        Irrigate 20 cc each nostril QID X 2 month supply   Quantity:  47979 mL   Refills:  3       * tamsulosin 0.4 MG capsule   Commonly known as:  FLOMAX   This may have changed:  when to take this   Used for:  Right flank pain, Calculus of kidney        Dose:  0.4 mg   Take 1 capsule (0.4 mg) by mouth daily   Quantity:  30 capsule   Refills:  0       * tamsulosin 0.4 MG capsule   Commonly known as:  FLOMAX   This may have changed:  You were already taking a medication with the same name, and this prescription was added. Make sure you understand how and when to take each.   Used for:  Kidney stone        Dose:  0.4 mg   Take 1 capsule (0.4 mg) by mouth daily   Quantity:  7 capsule   Refills:  0       * Notice:  This list has 2 medication(s) that are the same as other medications prescribed for you. Read the directions carefully, and ask your doctor or other care provider to review them  with you.      CONTINUE these medicines which have NOT CHANGED        Dose / Directions    Beclomethasone Dipropionate 80 MCG/ACT Aers Nasal Spray   Commonly known as:  QNASL   Used for:  Allergic rhinitis caused by mold        Dose:  2 spray   Spray 2 sprays into both nostrils daily   Quantity:  8.7 g   Refills:  3       CALCIUM-MAGNESIUM-ZINC PO        Dose:  1 tablet   Take 1 tablet by mouth every evening   Refills:  0       ibuprofen 100 MG Tabs        Dose:  500 mg   Take 500 mg by mouth as needed   Refills:  0       loratadine 10 MG tablet   Commonly known as:  CLARITIN        Dose:  10 mg   Take 10 mg by mouth as needed Reported on 3/22/2017   Refills:  0       OMEGA-3 FISH OIL PO        Dose:  4 capsule   Take 4 capsules by mouth every evening   Refills:  0       ondansetron 4 MG ODT tab   Commonly known as:  ZOFRAN ODT   Used for:  Right flank pain, Calculus of kidney        Dose:  4-8 mg   Take 1-2 tablets (4-8 mg) by mouth 3 times daily (before meals)   Quantity:  20 tablet   Refills:  1       oxyCODONE-acetaminophen 5-325 MG per tablet   Commonly known as:  PERCOCET   Used for:  Right flank pain, Calculus of kidney        Dose:  1 tablet   Take 1 tablet by mouth every 4 hours as needed for moderate to severe pain   Quantity:  18 tablet   Refills:  0       sodium chloride 0.9% (bottle) 0.9 % irrigation   Used for:  Chronic maxillary sinusitis        IRRIGATE 20 ML EACH NOSTRIL 4 TIMES DAILY FOR 2 MONTHS.   Quantity:  1000 mL   Refills:  3       traZODone 50 MG tablet   Commonly known as:  DESYREL   Used for:  Primary insomnia        Dose:  100 mg   Take 2 tablets (100 mg) by mouth nightly as needed for sleep   Quantity:  180 tablet   Refills:  PRN       VITAMIN B 12 PO        Dose:  1 capsule   Take 1 capsule by mouth every evening   Refills:  0       VITAMIN D (CHOLECALCIFEROL) PO        Take by mouth every evening   Refills:  0       VITAMIN K PO        Refills:  0       XIIDRA 5 % Soln   Generic drug:   Lifitegrast        Dose:  1 drop   Apply 1 drop to eye 2 times daily   Refills:  0       zolpidem 6.25 MG CR tablet   Commonly known as:  AMBIEN CR   Used for:  Primary insomnia        Dose:  6.25 mg   Take 1 tablet (6.25 mg) by mouth nightly as needed for sleep   Quantity:  90 tablet   Refills:  1            Where to get your medicines      These medications were sent to [facility] [place], [place] - [address]  [address] [address]     Phone:  [phone]     nitroFURantoin (macrocrystal-monohydrate) 100 MG capsule    oxybutynin 5 MG tablet    phenazopyridine 200 MG tablet    tamsulosin 0.4 MG capsule         Some of these will need a paper prescription and others can be bought over the counter. Ask your nurse if you have questions.     Bring a paper prescription for each of these medications     oxyCODONE IR 5 MG tablet                Protect others around you: Learn how to safely use, store and throw away your medicines at www.disposemymeds.org.             Medication List: This is a list of all your medications and when to take them. Check marks below indicate your daily home schedule. Keep this list as a reference.      Medications           Morning Afternoon Evening Bedtime As Needed    Beclomethasone Dipropionate 80 MCG/ACT Aers Nasal Spray   Commonly known as:  QNASL   Spray 2 sprays into both nostrils daily                                busPIRone 15 MG tablet   Commonly known as:  BUSPAR   Take 1 tablet (15 mg) by mouth 3 times daily                                CALCIUM-MAGNESIUM-ZINC PO   Take 1 tablet by mouth every evening                                clindamycin 150 MG capsule   Commonly known as:  CLEOCIN   Open one capsule and place in 1 Litre of Normal Saline and mix. Irrigate with 20 cc each nostril QID.                                escitalopram 20 MG tablet   Commonly known as:  LEXAPRO   Take 1 tablet (20 mg) by mouth daily                                 ibuprofen 100 MG Tabs   Take 500 mg by mouth as needed                                loratadine 10 MG tablet   Commonly known as:  CLARITIN   Take 10 mg by mouth as needed Reported on 3/22/2017                                nitroFURantoin (macrocrystal-monohydrate) 100 MG capsule   Commonly known as:  MACROBID   Take 1 capsule (100 mg) by mouth 2 times daily                                norethindrone-ethinyl estradiol 1-20 MG-MCG per tablet   Commonly known as:  JUNEL FE 1/20   Take 1 tablet by mouth daily                                OMEGA-3 FISH OIL PO   Take 4 capsules by mouth every evening                                ondansetron 4 MG ODT tab   Commonly known as:  ZOFRAN ODT   Take 1-2 tablets (4-8 mg) by mouth 3 times daily (before meals)                                oxybutynin 5 MG tablet   Commonly known as:  DITROPAN   Take 1 tablet (5 mg) by mouth 2 times daily                                oxyCODONE IR 5 MG tablet   Commonly known as:  ROXICODONE   Take 1 tablet (5 mg) by mouth every 4 hours as needed for pain                                oxyCODONE-acetaminophen 5-325 MG per tablet   Commonly known as:  PERCOCET   Take 1 tablet by mouth every 4 hours as needed for moderate to severe pain                                phenazopyridine 200 MG tablet   Commonly known as:  PYRIDIUM   Take 1 tablet (200 mg) by mouth 3 times daily as needed for irritation                                sodium chloride 0.9 % Soln   Irrigate 20 cc each nostril QID X 2 month supply                                sodium chloride 0.9% (bottle) 0.9 % irrigation   IRRIGATE 20 ML EACH NOSTRIL 4 TIMES DAILY FOR 2 MONTHS.                                * tamsulosin 0.4 MG capsule   Commonly known as:  FLOMAX   Take 1 capsule (0.4 mg) by mouth daily                                * tamsulosin 0.4 MG capsule   Commonly known as:  FLOMAX   Take 1 capsule (0.4 mg) by mouth daily                                 traZODone 50 MG tablet   Commonly known as:  DESYREL   Take 2 tablets (100 mg) by mouth nightly as needed for sleep                                VITAMIN B 12 PO   Take 1 capsule by mouth every evening                                VITAMIN D (CHOLECALCIFEROL) PO   Take by mouth every evening                                VITAMIN K PO                                XIIDRA 5 % Soln   Apply 1 drop to eye 2 times daily   Generic drug:  Lifitegrast                                zolpidem 6.25 MG CR tablet   Commonly known as:  AMBIEN CR   Take 1 tablet (6.25 mg) by mouth nightly as needed for sleep                                * Notice:  This list has 2 medication(s) that are the same as other medications prescribed for you. Read the directions carefully, and ask your doctor or other care provider to review them with you.

## 2017-11-30 NOTE — DISCHARGE INSTRUCTIONS
Discharge Instructions: Following a Cystoscopy/Stent and/or Ureteroscopy    Drainage:    Urine may be slightly bloody but should taper off in a few days.    You may have some frequency and urgency for a few days.    Comfort:    A burning sensation when urinating is common. Drinking extra fluids helps decrease this burning feeling and also helps to clear the bloody urine.    Mild abdominal cramps may occur for a few days.    Baths:    Daily tub baths aide in passing urine.    Frequent use of bubble bath is discouraged since it encourages urinary tract infections.    Report to your doctor at once if you experience:    Inability to pass your urine    Continuous or heavy bleeding    Severe Pain    Elevated temperature > than 101.5 for 24 hours    Home Activity:    The day of surgery spend a quiet evening at home.    Increase activity as tolerated.    Rev. 5/12  Same-Day Surgery   Adult Discharge Orders & Instructions     For 24 hours after surgery:  1. Get plenty of rest.  A responsible adult must stay with you for at least 24 hours after you leave the hospital.   2. Pain medication can slow your reflexes. Do not drive or use heavy equipment.  If you have weakness or tingling, don't drive or use heavy equipment until this feeling goes away.  3. Mixing alcohol and pain medication can cause dizziness and slow your breathing. It can even be fatal. Do not drink alcohol while taking pain medication.  4. Avoid strenuous or risky activities.  Ask for help when climbing stairs.   5. You may feel lightheaded.  If so, sit for a few minutes before standing.  Have someone help you get up.   6. If you have nausea (feel sick to your stomach), drink only clear liquids such as apple juice, ginger ale, broth or 7-Up.  Rest may also help.  Be sure to drink enough fluids.  Move to a regular diet as you feel able. Take pain medications with a small amount of solid food, such as toast or crackers, to avoid nausea.   7. A slight fever is  normal. Call the doctor if your fever is over 100 F (37.7 C) (taken under the tongue) or lasts longer than 24 hours.  8. You may have a dry mouth, muscle aches, trouble sleeping or a sore throat.  These symptoms should go away after 24 hours.  9. Do not make important or legal decisions.   Pain Management:      1. Take pain medication (if prescribed) for pain as directed by your physician.        2. WARNING: If the pain medication you have been prescribed contains Tylenol  (acetaminophen), DO NOT take additional doses of Tylenol (acetaminophen).     Call your doctor for any of the followin.  Signs of infection (fever, growing tenderness at the surgery site, severe pain, a large amount of drainage or bleeding, foul-smelling drainage, redness, swelling).    2.  It has been over 8 to 10 hours since surgery and you are still not able to urinate (pee).    3.  Headache for over 24 hours.    4.  Numbness, tingling or weakness the day after surgery (if you had spinal anesthesia).  To contact a doctor, call :      [phone] and ask for the Resident On Call for:         Urology, Dr. Lauren (answered 24 hours a day)      Emergency Department:  [facility]: [phone]  [facility] [facility]: [phone]               Rev. 10/2014   If you use hormonal birth control (such as the pill, patch, ring or implants):  You will need a second form of birth control for 7 days (condoms, a diaphragm or contraceptive foam).  While in the surgery center, you received a medicine called Sugammadex.  Hormonal birth control (such as the pill, patch, ring or implants) will not work as well for a week after taking this medicine.

## 2017-11-30 NOTE — ANESTHESIA POSTPROCEDURE EVALUATION
Patient: Iliana Mcfarlane    Procedure(s):  Cystoscopy, Right Ureteroscopy with Holmium Laser Lithotripsy, Right Stent Placement - Wound Class: II-Clean Contaminated    Diagnosis:Right Ureteral Stones  Diagnosis Additional Information: No value filed.    Anesthesia Type:  General, LMA    Note:  Anesthesia Post Evaluation    Patient location during evaluation: PACU  Patient participation: Able to fully participate in evaluation  Level of consciousness: awake and alert  Pain management: adequate  Airway patency: patent  Cardiovascular status: acceptable  Respiratory status: acceptable  Hydration status: acceptable  PONV: none     Anesthetic complications: None          Last vitals:  Vitals:    11/30/17 1417 11/30/17 1430 11/30/17 1445   BP: 131/84 (!) 138/95 (!) 157/104   Pulse:      Resp: 16 12 10   Temp: 36.3  C (97.3  F)  36.7  C (98.1  F)   SpO2: 100% 100% 100%         Electronically Signed By: Dougie Cotto MD, MD  November 30, 2017  2:47 PM

## 2017-11-30 NOTE — ANESTHESIA CARE TRANSFER NOTE
Patient: Iliana Mcfarlane    Procedure(s):  Cystoscopy, Right Ureteroscopy with Holmium Laser Lithotripsy, Right Stent Placement - Wound Class: II-Clean Contaminated    Diagnosis: Right Ureteral Stones  Diagnosis Additional Information: No value filed.    Anesthesia Type:   General, LMA     Note:  Airway :Face Mask  Patient transferred to:PACU  Comments: Report to RN, vss, IV and airway patent, sitting up, states comfort, some nausea reportedHandoff Report: Identifed the Patient, Identified the Reponsible Provider, Reviewed the pertinent medical history, Discussed the surgical course, Reviewed Intra-OP anesthesia mangement and issues during anesthesia, Set expectations for post-procedure period and Allowed opportunity for questions and acknowledgement of understanding      Vitals: (Last set prior to Anesthesia Care Transfer)    CRNA VITALS  11/30/2017 1345 - 11/30/2017 1420      11/30/2017             Pulse: 104    SpO2: 100 %    Resp Rate (observed): (!)  1                Electronically Signed By: DARRIN Adams CRNA  November 30, 2017  2:20 PM

## 2017-11-30 NOTE — PROGRESS NOTES
"         UROLOGY FOLLOW-UP NOTE          Chief Complaint:   Today I had the pleasure of seeing Ms. Iliana Mcfarlane in follow-up for a chief complaint of right ureteral stone         Interval Update    Iliana Mcfarlane is a very pleasant 52 year old female     Brief  History: 2 weeks of right renal colic with known obstructng ureteral stone.  Has elected definitive management with ureteroscopy.  No recent signs of infection.  Urine culture with <10K bacteria, no hx of UTI.  Reports ongoing flank discomfort, has been straining urine and has not seen stone pass.         Physical Exam:   Patient is a 52 year old  female   Vitals: Blood pressure 127/85, pulse 82, temperature 97.7  F (36.5  C), temperature source Oral, resp. rate 14, height 1.626 m (5' 4\"), weight 69.3 kg (152 lb 12.5 oz), last menstrual period 11/24/2017, SpO2 98 %, not currently breastfeeding.  General Appearance Adult: Alert, no acute distress, oriented  HENT: throat/mouth:normal, good dentition  Neck: No adenopathy,masses or thyromegaly  Lungs: no respiratory distress, or pursed lip breathing  Heart: No obvious jugular venous distension present  Abdomen: soft, nontender, no organomegaly or masses, Body mass index is 26.22 kg/(m^2).  Lymphatics: No cervical or supraclavicular adenopathy  Musculoskeltal: extremities normal, no peripheral edema  Skin: no suspicious lesions or rashes  Neuro: Alert, oriented, speech and mentation normal  Psych: affect and mood normal  Gait: Normal      Labs and Pathology:    I personally reviewed all applicable laboratory data and went over findings with patient  Significant for:    CBC RESULTS:  Recent Labs   Lab Test  11/28/17   1631  01/19/17   2112  07/20/16   1253  03/01/16   0914   WBC  5.7  6.4  5.5  6.6   HGB  12.5  12.9  12.8  13.0   PLT  325  247  239  257        BMP RESULTS:  Recent Labs   Lab Test  11/28/17   1631  01/19/17   2112  03/01/16   0914  07/10/14   0749   NA  137  139  138  141   POTASSIUM  3.4  " 3.4  4.1  4.1   CHLORIDE  103  104  108  106   CO2  27  29  23  24   ANIONGAP  7  6  7  11   GLC  103*  94  87  92   BUN  12  14  15  8   CR  1.05*  0.89  0.79  0.74   GFRESTIMATED  55*  67  77  84   GFRESTBLACK  67  81  >90   GFR Calc    >90   NOESIMO  9.0  8.9  8.7  9.0       UA RESULTS:   Recent Labs   Lab Test  11/28/17   1646  11/15/17   1131  02/02/15   1259   SG  1.008  1.025  1.020   URINEPH  6.0  6.5  7.0   NITRITE  Negative  Negative  Negative   RBCU  36*  25-50*  2-5*   WBCU  3*  O - 2  10-25*           Imaging:    I personally reviewed all applicable imaging and went over findings with patient.  Significant for   Results for orders placed or performed in visit on 11/24/17   CT Abdomen Pelvis w/o Contrast    Narrative    EXAMINATION: CT ABDOMEN PELVIS W/O CONTRAST, 11/24/2017 8:42 AM    TECHNIQUE:  Helical CT images from the lung bases through the  symphysis pubis were obtained  without IV contrast.    COMPARISON: Ultrasound abdomen on 6/22/2009    HISTORY: abdominal pain, history of kidney stones, probably kidney  stone at this time.; Right flank pain    FINDINGS:  Lung bases: are clear.    Abdomen/pelvis: There is a 5 x 3 x 6 mm cm obstructing midright  ureteric stone causing mild/moderate upstream hydroureteronephrosis  (series 3 image 191 and series 4 image 31). No additional radiodense  kidney or ureteric stones identified. The left kidney is unremarkable.  No stones in the urinary bladder.    There is tiny gallstone. Focal hypodense area in the gallbladder  fundus, could be due to adenomyomatosis versus sludge within the  fundus with associated mucosal fold. The unenhanced liver, spleen,  adrenal glands and pancreas are unremarkable. Small presumed accessory  spleens/splenules.    Extensive colonic/sigmoid diverticulosis without CT evidence of acute  diverticular disease. The appendix is normal. No abnormally dilated  bowel loops. No significant free fluid in the abdomen and pelvis.  No  free peritoneal portal venous gas. No significant lymphadenopathy in  the abdomen and pelvis. Normal appearance of the uterus for age. No  adnexal mass.    Bones: No worrisome osseous lesion.      Impression    IMPRESSION:   1. 6 mm obstructing ureteral ureteral stone with mild associated  hydroureteronephrosis.  2. Extensive colonic diverticulosis without CT evidence of acute  diverticulitis.  3. Cholelithiasis without CT evidence of acute cholecystitis.  Additional hyperdense material within the gallbladder fundus, could be  sludge within the gallbladder fundus versus focal fundal  adenomyomatosis.  Right upper quadrant ultrasound could be considered  if clinically indicated.    Findings were communicated to the ordering provider MICKEY RYAN over the telephone by Dr. Carballo at approximately at 9:00  AM on 2017.    I have personally reviewed the examination and initial interpretation  and I agree with the findings.    DINH CARBALLO MD              Past Medical History:     Past Medical History:   Diagnosis Date     Allergic rhinitis late      Allergic rhinitis, cause unspecified      Anxiety      Depression, major      Depressive disorder      Esophageal reflux      Genital herpes      Glaucoma      Hoarseness late      Lumbago      Menarche age 16     Menorrhagia      Nephrolithiasis      PONV (postoperative nausea and vomiting)      Reduced vision             Past Surgical History:     Past Surgical History:   Procedure Laterality Date     C NONSPECIFIC PROCEDURE      Upper GI endoscopy     C NONSPECIFIC PROCEDURE      Tonsillectomy     C NONSPECIFIC PROCEDURE  85    Oral surgery, wisdom teeth     C NONSPECIFIC PROCEDURE           C NONSPECIFIC PROCEDURE      Hysteroscopy, ablation of uterine fibroid     C NONSPECIFIC PROCEDURE      miscarriage     CATARACT IOL, RT/LT       ENT SURGERY      tonsillectomy     EYE SURGERY      trabeculectomy     GI SURGERY       dairy intolerence - resolved     HEAD & NECK SURGERY      trabeculectomy, cataract, yag     OPERATIVE HYSTEROSCOPY WITH MORCELLATOR  7/25/2014    Procedure: OPERATIVE HYSTEROSCOPY WITH MORCELLATOR;  Surgeon: Maureen Tavarez MD;  Location: [facility]     SURGICAL HISTORY OF -   12/11    right eye trabeculectomy     TONSILLECTOMY  1987            Medications     Current Facility-Administered Medications   Medication     ceFAZolin (ANCEF) intermittent infusion 2 g in 100 mL dextrose PRE-MIX     ceFAZolin (ANCEF) 1 g vial to attach to  ml bag for ADULT or 50 ml bag for PEDS     lactated ringers infusion            Family History:     Family History   Problem Relation Age of Onset     Hypertension Mother      Obesity Mother      Hyperlipidemia Mother      DIABETES Paternal Grandmother      CEREBROVASCULAR DISEASE Paternal Grandmother      Alcohol/Drug Paternal Grandfather      CANCER Paternal Grandfather      Substance Abuse Paternal Grandfather      HEART DISEASE Maternal Grandmother      Alzheimer Disease Maternal Grandmother      not until mid-90 yrs     Breast Cancer Maternal Aunt      CANCER Maternal Aunt      brain     CANCER Father      Other Cancer Father      CANCER Maternal Grandfather             Social History:     Social History     Social History     Marital status:      Spouse name: N/A     Number of children: N/A     Years of education: N/A     Occupational History     Not on file.     Social History Main Topics     Smoking status: Never Smoker     Smokeless tobacco: Never Used     Alcohol use 0.0 oz/week      Comment: minimal     Drug use: No     Sexual activity: Yes     Partners: Male     Birth control/ protection: Pill     Other Topics Concern     Parent/Sibling W/ Cabg, Mi Or Angioplasty Before 65f 55m? No     Social History Narrative    Dairy/d 2-3 servings/d.     Caffeine 1-2 servings/d    Exercise 0 x week but chasing kids    Sunscreen used - Yes    Seatbelts used - Yes    Working  smoke/CO detectors in the home - Yes    Guns stored in the home - No    Self Breast Exams - No    Self Testicular Exam - NA    Eye Exam up to date - Yes    Dental Exam up to date - Yes    Pap Smear up to date - no    Mammogram up to date - No due to nursing    PSA up to date - NA    Dexa Scan up to date - NA    Flex Sig / Colonoscopy up to date - NA    Immunizations up to date -yes TD 2006    Abuse: Current or Past(Physical, Sexual or Emotional)- No    Do you feel safe in your environment - Yes            Allergies:   No known drug allergies         Review of Systems:  From intake questionnaire   Negative 14 system review except as noted on HPI, nurse's note.           Assessment/Plan     We discussed the nature of ureteral stones and obstruction.  We discussed spontaneous stone passage, medical expulsive therapy as well as stone removal.  The patient has ultimately decided to proceed with ureteroscopy, laser lithotripsy, stent.  We discussed the risks of bleeding, infection, incomplete stone removal, damage to adjacent organs, and need for staged procedures.      IHelio saw and evaluated this patient and agree with the plan as stated above

## 2017-11-30 NOTE — IP AVS SNAPSHOT
[facility]    [address] [address]    Phone:  [phone]                                       After Visit Summary   11/30/2017    Iliana Mcfarlane    MRN: [id number]           After Visit Summary Signature Page     I have received my discharge instructions, and my questions have been answered. I have discussed any challenges I see with this plan with the nurse or doctor.    ..........................................................................................................................................  Patient/Patient Representative Signature      ..........................................................................................................................................  Patient Representative Print Name and Relationship to Patient    ..................................................               ................................................  Date                                            Time    ..........................................................................................................................................  Reviewed by Signature/Title    ...................................................              ..............................................  Date                                                            Time

## 2017-11-30 NOTE — BRIEF OP NOTE
[facility], [place]    Brief Operative Note    Pre-operative diagnosis: Right Ureteral Stones  Post-operative diagnosis Same as above   Procedure: Procedure(s):  Cystoscopy, Right Ureteroscopy with Holmium Laser Lithotripsy, Right Stent Placement - Wound Class: II-Clean Contaminated  Surgeon: Surgeon(s) and Role:     * Helio Lauren MD - Primary     * Kaylin Russell MD - Resident - Assisting  Anesthesia: General   Estimated blood loss: Minimal  Drains:  6 x 24 cm stent   Specimens:   ID Type Source Tests Collected by Time Destination   1 : Right Ureteral Stone Calculus/Stone Ureter, Right STONE ANALYSIS Helio Lauren MD 11/30/2017  1:56 PM      Findings:   Stone free at end of procedure   Complications: None.  Implants: None.

## 2017-12-01 NOTE — OP NOTE
OPERATIVE REPORT    PREOPERATIVE DIAGNOSIS:  Right Ureteral Stone    POSTOPERATIVE DIAGNOSIS:  Same    PROCEDURES PERFORMED:   1. Cystourethroscopy  2. Right ureteroscopy  3. Right retrograde pyelogram with interpretation of intraoperative fluoroscopic imaging  4. Holmium laser lithotripsy with basket stone extraction  5. Right ureteral stent placement    STAFF SURGEON: Helio Lauren, present and participatory for the entire case.   RESIDENT(S): Kaylin Russell MD  ANESTHESIA: General    ESTIMATED BLOOD LOSS: <5 cc  DRAINS/TUBES: 6 Argentine x 24 cm double-J ureteral stent    IV FLUIDS: Please see dictated anesthesia record  COMPLICATIONS: None.   SPECIMEN: Right ureteral stone for analysis  SIGNIFICANT FINDINGS: Right ureteral stone    BRIEF OPERATIVE INDICATIONS: \ Iliana Mcfarlane is a(n) 52 year old female who presented with an obstructing 6 mm right ureteral ston.  After a discussion of all risks, benefits, and alternatives, the patient elected to proceed with definitive stone management with a ureteroscopic approach.    After induction of general anesthesia the patient was repositioned in dorsal lithotomy and prepped and draped in the standard sterile fashion.  A time out was performed confirming the appropriate patient identity and planned procedure.  The patient received culture directed antibiotics and had bilateral sequential compression devices for DVT propylaxis.    A 22-Argentine rigid cystoscope was inserted into a well-lubricated urethra. The bladder was anatomically normal, there were no tumors or stones.  We advanced a sensor wire up the right ureter through the orifice under fluoroscopic guidance to the kidney.  Next, a semi-rigid ureteroscope was introduced and advanced adjacent to the wire up to the mid ureter with no evidence of stone.  On fluoroscopy the stone appeared just slightly higher.  We next used the flexible scope to confirm it was in the mid ureter, we were able to push it back up to the  kidney.  At this point we placed an 11/13 36 cm ureteral access sheath to the mid ureter, it was slightly tight above this point.  We used this to help advance the ureteroscope to the kidney where we positioned the stone in a dependent upper pole calyx and fragmented it with a 200 micron holmium laser at a setting of 0.6J and 6 Hz.  WE then used a tipless 1.5F stone basket to remove all fragments > 2mm.  Retrograde pyelography was performed to confirm no extravasation or missed calyces.  No other stones were identified on endoscopy.  Pullback ureteroscopy was performed and showed no retained stone fragments or significant ureteral injury.  A 6 x 24 double J stent was then placed with a full curl in the kidney and bladder.  A string was left to facilitate removal in 7 days. The bladder was drained.    The patient tolerated the procedure well and there were no apparent complications. The patient  was transported to the postanesthesia care unit in stable condition.     POSTOP PLAN:  -REmove stent via string in 7 days    Helio Lauren

## 2017-12-03 LAB
APPEARANCE STONE: NORMAL
COMPN STONE: NORMAL
NUMBER STONE: 4
SIZE STONE: NORMAL MM
WT STONE: 41 MG

## 2017-12-05 ENCOUNTER — TELEPHONE (OUTPATIENT)
Dept: UROLOGY | Facility: CLINIC | Age: 52
End: 2017-12-05

## 2017-12-05 NOTE — TELEPHONE ENCOUNTER
Patient called about getting help with stent removal. She was able to remove stent without any problems.  Having nausea and vomiting on and off  Has both zofran and other medications to help hopefully she will not have any more pain or spasms. Edna Savaeg LPN Staff Nurse

## 2017-12-06 ENCOUNTER — MYC MEDICAL ADVICE (OUTPATIENT)
Dept: FAMILY MEDICINE | Facility: CLINIC | Age: 52
End: 2017-12-06

## 2017-12-07 NOTE — TELEPHONE ENCOUNTER
The patient called to schedule an appointment but there are no available appointments today or Friday.  What do you recommend the patient do about getting an appointment either today or Friday.  Please follow up with the patient at the number provided below.

## 2017-12-08 ENCOUNTER — OFFICE VISIT (OUTPATIENT)
Dept: FAMILY MEDICINE | Facility: CLINIC | Age: 52
End: 2017-12-08
Payer: COMMERCIAL

## 2017-12-08 VITALS
HEART RATE: 70 BPM | WEIGHT: 149 LBS | SYSTOLIC BLOOD PRESSURE: 100 MMHG | RESPIRATION RATE: 16 BRPM | TEMPERATURE: 97.9 F | BODY MASS INDEX: 25.58 KG/M2 | DIASTOLIC BLOOD PRESSURE: 72 MMHG

## 2017-12-08 DIAGNOSIS — R30.0 DYSURIA: Primary | ICD-10-CM

## 2017-12-08 DIAGNOSIS — J02.9 SORE THROAT: ICD-10-CM

## 2017-12-08 DIAGNOSIS — M79.10 MYALGIA: ICD-10-CM

## 2017-12-08 DIAGNOSIS — R94.4 ABNORMAL RENAL FUNCTION TEST: ICD-10-CM

## 2017-12-08 LAB
ALBUMIN UR-MCNC: 30 MG/DL
ANION GAP SERPL CALCULATED.3IONS-SCNC: 8 MMOL/L (ref 3–14)
APPEARANCE UR: CLEAR
BACTERIA #/AREA URNS HPF: ABNORMAL /HPF
BILIRUB UR QL STRIP: NEGATIVE
BUN SERPL-MCNC: 16 MG/DL (ref 7–30)
CALCIUM SERPL-MCNC: 9.1 MG/DL (ref 8.5–10.1)
CHLORIDE SERPL-SCNC: 106 MMOL/L (ref 94–109)
CO2 SERPL-SCNC: 26 MMOL/L (ref 20–32)
COLOR UR AUTO: YELLOW
CREAT SERPL-MCNC: 1.04 MG/DL (ref 0.52–1.04)
DEPRECATED S PYO AG THROAT QL EIA: NORMAL
ERYTHROCYTE [DISTWIDTH] IN BLOOD BY AUTOMATED COUNT: 12.3 % (ref 10–15)
GFR SERPL CREATININE-BSD FRML MDRD: 56 ML/MIN/1.7M2
GLUCOSE SERPL-MCNC: 108 MG/DL (ref 70–99)
GLUCOSE UR STRIP-MCNC: NEGATIVE MG/DL
HCT VFR BLD AUTO: 39.7 % (ref 35–47)
HGB BLD-MCNC: 12.7 G/DL (ref 11.7–15.7)
HGB UR QL STRIP: ABNORMAL
KETONES UR STRIP-MCNC: NEGATIVE MG/DL
LEUKOCYTE ESTERASE UR QL STRIP: NEGATIVE
MCH RBC QN AUTO: 31.4 PG (ref 26.5–33)
MCHC RBC AUTO-ENTMCNC: 32 G/DL (ref 31.5–36.5)
MCV RBC AUTO: 98 FL (ref 78–100)
MUCOUS THREADS #/AREA URNS LPF: PRESENT /LPF
NITRATE UR QL: NEGATIVE
NON-SQ EPI CELLS #/AREA URNS LPF: ABNORMAL /LPF
PH UR STRIP: 5.5 PH (ref 5–7)
PLATELET # BLD AUTO: 288 10E9/L (ref 150–450)
POTASSIUM SERPL-SCNC: 3.7 MMOL/L (ref 3.4–5.3)
RBC # BLD AUTO: 4.04 10E12/L (ref 3.8–5.2)
RBC #/AREA URNS AUTO: ABNORMAL /HPF
SODIUM SERPL-SCNC: 140 MMOL/L (ref 133–144)
SOURCE: ABNORMAL
SP GR UR STRIP: 1.02 (ref 1–1.03)
SPECIMEN SOURCE: NORMAL
UROBILINOGEN UR STRIP-ACNC: 0.2 EU/DL (ref 0.2–1)
WBC # BLD AUTO: 6.3 10E9/L (ref 4–11)
WBC #/AREA URNS AUTO: ABNORMAL /HPF

## 2017-12-08 PROCEDURE — 87880 STREP A ASSAY W/OPTIC: CPT | Performed by: FAMILY MEDICINE

## 2017-12-08 PROCEDURE — 85027 COMPLETE CBC AUTOMATED: CPT | Performed by: FAMILY MEDICINE

## 2017-12-08 PROCEDURE — 99214 OFFICE O/P EST MOD 30 MIN: CPT | Performed by: FAMILY MEDICINE

## 2017-12-08 PROCEDURE — 80048 BASIC METABOLIC PNL TOTAL CA: CPT | Performed by: FAMILY MEDICINE

## 2017-12-08 PROCEDURE — 81001 URINALYSIS AUTO W/SCOPE: CPT | Performed by: FAMILY MEDICINE

## 2017-12-08 PROCEDURE — 87086 URINE CULTURE/COLONY COUNT: CPT | Performed by: FAMILY MEDICINE

## 2017-12-08 PROCEDURE — 87081 CULTURE SCREEN ONLY: CPT | Performed by: FAMILY MEDICINE

## 2017-12-08 PROCEDURE — 36415 COLL VENOUS BLD VENIPUNCTURE: CPT | Performed by: FAMILY MEDICINE

## 2017-12-08 NOTE — MR AVS SNAPSHOT
After Visit Summary   12/8/2017    Iliana Mcfarlane    MRN: [id number]           Patient Information     Date Of Birth          1965        Visit Information        Provider Department      12/8/2017 8:20 AM Brenton Rae MD [facility]        Today's Diagnoses     Dysuria    -  1    Myalgia        Abnormal renal function test        Sore throat           Follow-ups after your visit        Who to contact     If you have questions or need follow up information about today's clinic visit or your schedule please contact [facility] directly at [phone].  Normal or non-critical lab and imaging results will be communicated to you by [employer], letter or phone within 4 business days after the clinic has received the results. If you do not hear from us within 7 days, please contact the clinic through [employer] or phone. If you have a critical or abnormal lab result, we will notify you by phone as soon as possible.  Submit refill requests through [employer] or call your pharmacy and they will forward the refill request to us. Please allow 3 business days for your refill to be completed.          Additional Information About Your Visit        MyChart Information     [employer] gives you secure access to your electronic health record. If you see a primary care provider, you can also send messages to your care team and make appointments. If you have questions, please call your primary care clinic.  If you do not have a primary care provider, please call [phone] and they will assist you.        Care EveryWhere ID     This is your Care EveryWhere ID. This could be used by other organizations to access your [place] medical records  [id number]        Your Vitals Were     Pulse Temperature Respirations Last Period BMI (Body Mass Index)       70 97.9  F (36.6  C) (Oral) 16 11/24/2017 25.58 kg/m2        Blood Pressure from Last 3 Encounters:   12/08/17 100/72   11/30/17  (!) 151/98   11/28/17 137/88    Weight from Last 3 Encounters:   12/08/17 149 lb (67.6 kg)   11/30/17 152 lb 12.5 oz (69.3 kg)   11/28/17 152 lb 8 oz (69.2 kg)              We Performed the Following     Basic metabolic panel     Beta strep group A culture     CBC with platelets     Strep, Rapid Screen     UA reflex to Microscopic and Culture     Urine Culture Aerobic Bacterial     Urine Microscopic          Today's Medication Changes          These changes are accurate as of: 12/8/17 11:59 PM.  If you have any questions, ask your nurse or doctor.               Start taking these medicines.        Dose/Directions    amoxicillin-clavulanate 875-125 MG per tablet   Commonly known as:  AUGMENTIN   Used for:  Dysuria   Started by:  Brenton Rae MD        Dose:  1 tablet   Take 1 tablet by mouth 2 times daily   Quantity:  14 tablet   Refills:  0         These medicines have changed or have updated prescriptions.        Dose/Directions    busPIRone 15 MG tablet   Commonly known as:  BUSPAR   This may have changed:    - when to take this  - reasons to take this   Used for:  Anxiety        Dose:  15 mg   Take 1 tablet (15 mg) by mouth 3 times daily   Quantity:  270 tablet   Refills:  3       clindamycin 150 MG capsule   Commonly known as:  CLEOCIN   This may have changed:    - when to take this  - reasons to take this  - additional instructions   Used for:  Chronic maxillary sinusitis, Chronic rhinitis, Nasal turbinate hypertrophy, Deviated nasal septum, Uncomplicated asthma, unspecified asthma severity        Open one capsule and place in 1 Litre of Normal Saline and mix. Irrigate with 20 cc each nostril QID.   Quantity:  10 capsule   Refills:  3       escitalopram 20 MG tablet   Commonly known as:  LEXAPRO   This may have changed:  when to take this   Used for:  Anxiety        Dose:  20 mg   Take 1 tablet (20 mg) by mouth daily   Quantity:  90 tablet   Refills:  PRN       norethindrone-ethinyl estradiol 1-20 MG-MCG  per tablet   Commonly known as:  JUNEL FE 1/20   This may have changed:  when to take this   Used for:  Encounter for surveillance of contraceptive pills        Dose:  1 tablet   Take 1 tablet by mouth daily   Quantity:  84 tablet   Refills:  PRN       sodium chloride 0.9 % Soln   This may have changed:    - when to take this  - reasons to take this  - additional instructions   Used for:  Chronic maxillary sinusitis, Chronic rhinitis, Nasal turbinate hypertrophy, Deviated nasal septum, Uncomplicated asthma, unspecified asthma severity        Irrigate 20 cc each nostril QID X 2 month supply   Quantity:  75621 mL   Refills:  3       tamsulosin 0.4 MG capsule   Commonly known as:  FLOMAX   This may have changed:    - when to take this  - Another medication with the same name was removed. Continue taking this medication, and follow the directions you see here.   Used for:  Right flank pain, Calculus of kidney   Changed by:  Nisreen Tabares APRN CNP        Dose:  0.4 mg   Take 1 capsule (0.4 mg) by mouth daily   Quantity:  30 capsule   Refills:  0         Stop taking these medicines if you haven't already. Please contact your care team if you have questions.     nitroFURantoin (macrocrystal-monohydrate) 100 MG capsule   Commonly known as:  MACROBID   Stopped by:  Brenton Rae MD           oxybutynin 5 MG tablet   Commonly known as:  DITROPAN   Stopped by:  Brenton Rae MD           oxyCODONE IR 5 MG tablet   Commonly known as:  ROXICODONE   Stopped by:  Brenton Rae MD           oxyCODONE-acetaminophen 5-325 MG per tablet   Commonly known as:  PERCOCET   Stopped by:  Brenton Rae MD           phenazopyridine 200 MG tablet   Commonly known as:  PYRIDIUM   Stopped by:  Brenton Rae MD                Where to get your medicines      These medications were sent to [id number] - [place] [address] [place] [address] [address] [facility]  [address]     Phone:  [phone]     amoxicillin-clavulanate 875-125 MG per tablet                Primary Care Provider Office Phone # Fax #    Janey HOWARD CHU Aguirre [phone] [address] [facility] [address]        Equal Access to Services     JOHN GONZALEZ : Hadii aad ku hadasho Soomaali, waaxda luqadaha, qaybta kaalmada adeegyada, waxay idiin hayaan adeeg kharash laJenaan . So [facility] [phone].    ATENCIÓN: Si habla español, tiene a case disposición servicios gratuitos de asistencia lingüística. [facility] [phone].    We comply with applicable federal civil rights laws and [place] laws. We do not discriminate on the basis of race, color, national origin, age, disability, sex, sexual orientation, or gender identity.            Thank you!     Thank you for choosing [facility]  for your care. Our goal is always to provide you with excellent care. Hearing back from our patients is one way we can continue to improve our services. Please take a few minutes to complete the written survey that you may receive in the mail after your visit with us. Thank you!             Your Updated Medication List - Protect others around you: Learn how to safely use, store and throw away your medicines at www.disposemymeds.org.          This list is accurate as of: 12/8/17 11:59 PM.  Always use your most recent med list.                   Brand Name Dispense Instructions for use Diagnosis    amoxicillin-clavulanate 875-125 MG per tablet    AUGMENTIN    14 tablet    Take 1 tablet by mouth 2 times daily    Dysuria       Beclomethasone Dipropionate 80 MCG/ACT Aers Nasal Spray    QNASL    8.7 g    Spray 2 sprays into both nostrils daily    Allergic rhinitis caused by mold       busPIRone 15 MG tablet    BUSPAR    270 tablet    Take 1 tablet (15 mg) by mouth 3 times daily    Anxiety       CALCIUM-MAGNESIUM-ZINC PO      Take 1 tablet by mouth every evening        clindamycin 150 MG capsule     CLEOCIN    10 capsule    Open one capsule and place in 1 Litre of Normal Saline and mix. Irrigate with 20 cc each nostril QID.    Chronic maxillary sinusitis, Chronic rhinitis, Nasal turbinate hypertrophy, Deviated nasal septum, Uncomplicated asthma, unspecified asthma severity       escitalopram 20 MG tablet    LEXAPRO    90 tablet    Take 1 tablet (20 mg) by mouth daily    Anxiety       ibuprofen 100 MG Tabs      Take 500 mg by mouth as needed        loratadine 10 MG tablet    CLARITIN     Take 10 mg by mouth as needed Reported on 3/22/2017        norethindrone-ethinyl estradiol 1-20 MG-MCG per tablet    JUNEL FE 1/20    84 tablet    Take 1 tablet by mouth daily    Encounter for surveillance of contraceptive pills       OMEGA-3 FISH OIL PO      Take 4 capsules by mouth every evening        ondansetron 4 MG ODT tab    ZOFRAN ODT    20 tablet    Take 1-2 tablets (4-8 mg) by mouth 3 times daily (before meals)    Right flank pain, Calculus of kidney       sodium chloride 0.9 % Soln     84371 mL    Irrigate 20 cc each nostril QID X 2 month supply    Chronic maxillary sinusitis, Chronic rhinitis, Nasal turbinate hypertrophy, Deviated nasal septum, Uncomplicated asthma, unspecified asthma severity       sodium chloride 0.9% (bottle) 0.9 % irrigation     1000 mL    IRRIGATE 20 ML EACH NOSTRIL 4 TIMES DAILY FOR 2 MONTHS.    Chronic maxillary sinusitis       tamsulosin 0.4 MG capsule    FLOMAX    30 capsule    Take 1 capsule (0.4 mg) by mouth daily    Right flank pain, Calculus of kidney       traZODone 50 MG tablet    DESYREL    180 tablet    Take 2 tablets (100 mg) by mouth nightly as needed for sleep    Primary insomnia       VITAMIN B 12 PO      Take 1 capsule by mouth every evening        VITAMIN D (CHOLECALCIFEROL) PO      Take by mouth every evening        VITAMIN K PO           XIIDRA 5 % Soln   Generic drug:  Lifitegrast      Apply 1 drop to eye 2 times daily        zolpidem 6.25 MG CR tablet    AMBIEN CR    90  tablet    Take 1 tablet (6.25 mg) by mouth nightly as needed for sleep    Primary insomnia

## 2017-12-08 NOTE — NURSING NOTE
"Chief Complaint   Patient presents with     Hospital F/U     surgery F/U-kidney stone removal        Initial /72 (BP Location: Right arm, Patient Position: Sitting, Cuff Size: Adult Regular)  Pulse 70  Temp 97.9  F (36.6  C) (Oral)  Resp 16  Wt 149 lb (67.6 kg)  LMP 11/24/2017  BMI 25.58 kg/m2 Estimated body mass index is 25.58 kg/(m^2) as calculated from the following:    Height as of 11/30/17: 5' 4\" (1.626 m).    Weight as of this encounter: 149 lb (67.6 kg).  Medication Reconciliation: complete       Miguel Monte MA      "

## 2017-12-09 LAB
BACTERIA SPEC CULT: NO GROWTH
BACTERIA SPEC CULT: NORMAL
SPECIMEN SOURCE: NORMAL
SPECIMEN SOURCE: NORMAL

## 2018-02-22 ENCOUNTER — OFFICE VISIT (OUTPATIENT)
Dept: FAMILY MEDICINE | Facility: CLINIC | Age: 53
End: 2018-02-22
Payer: COMMERCIAL

## 2018-02-22 VITALS
SYSTOLIC BLOOD PRESSURE: 117 MMHG | HEIGHT: 64 IN | DIASTOLIC BLOOD PRESSURE: 85 MMHG | OXYGEN SATURATION: 97 % | TEMPERATURE: 97.9 F | BODY MASS INDEX: 24.41 KG/M2 | HEART RATE: 86 BPM | WEIGHT: 143 LBS

## 2018-02-22 DIAGNOSIS — R10.30 LOWER ABDOMINAL PAIN: ICD-10-CM

## 2018-02-22 DIAGNOSIS — F41.9 ANXIETY: ICD-10-CM

## 2018-02-22 DIAGNOSIS — R11.0 NAUSEA: Primary | ICD-10-CM

## 2018-02-22 DIAGNOSIS — K80.20 CALCULUS OF GALLBLADDER WITHOUT CHOLECYSTITIS WITHOUT OBSTRUCTION: ICD-10-CM

## 2018-02-22 LAB
ALBUMIN SERPL-MCNC: 3.9 G/DL (ref 3.4–5)
ALBUMIN UR-MCNC: ABNORMAL MG/DL
ALP SERPL-CCNC: 76 U/L (ref 40–150)
ALT SERPL W P-5'-P-CCNC: 50 U/L (ref 0–50)
AMYLASE SERPL-CCNC: 62 U/L (ref 30–110)
ANION GAP SERPL CALCULATED.3IONS-SCNC: 9 MMOL/L (ref 3–14)
APPEARANCE UR: CLEAR
AST SERPL W P-5'-P-CCNC: 45 U/L (ref 0–45)
BACTERIA #/AREA URNS HPF: ABNORMAL /HPF
BASOPHILS # BLD AUTO: 0 10E9/L (ref 0–0.2)
BASOPHILS NFR BLD AUTO: 0.2 %
BILIRUB SERPL-MCNC: 0.3 MG/DL (ref 0.2–1.3)
BILIRUB UR QL STRIP: NEGATIVE
BUN SERPL-MCNC: 11 MG/DL (ref 7–30)
CALCIUM SERPL-MCNC: 9 MG/DL (ref 8.5–10.1)
CHLORIDE SERPL-SCNC: 104 MMOL/L (ref 94–109)
CO2 SERPL-SCNC: 25 MMOL/L (ref 20–32)
COLOR UR AUTO: YELLOW
CREAT SERPL-MCNC: 0.74 MG/DL (ref 0.52–1.04)
DIFFERENTIAL METHOD BLD: NORMAL
EOSINOPHIL # BLD AUTO: 0.1 10E9/L (ref 0–0.7)
EOSINOPHIL NFR BLD AUTO: 0.8 %
ERYTHROCYTE [DISTWIDTH] IN BLOOD BY AUTOMATED COUNT: 12.4 % (ref 10–15)
GFR SERPL CREATININE-BSD FRML MDRD: 83 ML/MIN/1.7M2
GLUCOSE SERPL-MCNC: 107 MG/DL (ref 70–99)
GLUCOSE UR STRIP-MCNC: NEGATIVE MG/DL
HCT VFR BLD AUTO: 41.3 % (ref 35–47)
HGB BLD-MCNC: 13.8 G/DL (ref 11.7–15.7)
HGB UR QL STRIP: ABNORMAL
KETONES UR STRIP-MCNC: NEGATIVE MG/DL
LEUKOCYTE ESTERASE UR QL STRIP: ABNORMAL
LIPASE SERPL-CCNC: 245 U/L (ref 73–393)
LYMPHOCYTES # BLD AUTO: 2.2 10E9/L (ref 0.8–5.3)
LYMPHOCYTES NFR BLD AUTO: 33.4 %
MCH RBC QN AUTO: 31.9 PG (ref 26.5–33)
MCHC RBC AUTO-ENTMCNC: 33.4 G/DL (ref 31.5–36.5)
MCV RBC AUTO: 95 FL (ref 78–100)
MONOCYTES # BLD AUTO: 0.4 10E9/L (ref 0–1.3)
MONOCYTES NFR BLD AUTO: 6.4 %
MUCOUS THREADS #/AREA URNS LPF: PRESENT /LPF
NEUTROPHILS # BLD AUTO: 3.9 10E9/L (ref 1.6–8.3)
NEUTROPHILS NFR BLD AUTO: 59.2 %
NITRATE UR QL: NEGATIVE
NON-SQ EPI CELLS #/AREA URNS LPF: ABNORMAL /LPF
PH UR STRIP: 5.5 PH (ref 5–7)
PLATELET # BLD AUTO: 294 10E9/L (ref 150–450)
POTASSIUM SERPL-SCNC: 3.8 MMOL/L (ref 3.4–5.3)
PROT SERPL-MCNC: 7.7 G/DL (ref 6.8–8.8)
RBC # BLD AUTO: 4.33 10E12/L (ref 3.8–5.2)
RBC #/AREA URNS AUTO: ABNORMAL /HPF
SODIUM SERPL-SCNC: 138 MMOL/L (ref 133–144)
SOURCE: ABNORMAL
SP GR UR STRIP: >1.03 (ref 1–1.03)
UROBILINOGEN UR STRIP-ACNC: 0.2 EU/DL (ref 0.2–1)
WBC # BLD AUTO: 6.6 10E9/L (ref 4–11)
WBC #/AREA URNS AUTO: ABNORMAL /HPF

## 2018-02-22 PROCEDURE — 99214 OFFICE O/P EST MOD 30 MIN: CPT | Performed by: NURSE PRACTITIONER

## 2018-02-22 PROCEDURE — 83690 ASSAY OF LIPASE: CPT | Performed by: NURSE PRACTITIONER

## 2018-02-22 PROCEDURE — 36415 COLL VENOUS BLD VENIPUNCTURE: CPT | Performed by: NURSE PRACTITIONER

## 2018-02-22 PROCEDURE — 81001 URINALYSIS AUTO W/SCOPE: CPT | Performed by: NURSE PRACTITIONER

## 2018-02-22 PROCEDURE — 80053 COMPREHEN METABOLIC PANEL: CPT | Performed by: NURSE PRACTITIONER

## 2018-02-22 PROCEDURE — 85025 COMPLETE CBC W/AUTO DIFF WBC: CPT | Performed by: NURSE PRACTITIONER

## 2018-02-22 PROCEDURE — 82150 ASSAY OF AMYLASE: CPT | Performed by: NURSE PRACTITIONER

## 2018-02-22 RX ORDER — OMEPRAZOLE 40 MG/1
40 CAPSULE, DELAYED RELEASE ORAL DAILY
Qty: 30 CAPSULE | Refills: 1 | Status: SHIPPED | OUTPATIENT
Start: 2018-02-22 | End: 2018-03-20

## 2018-02-22 NOTE — PROGRESS NOTES
"  SUBJECTIVE:   Iliana Mcfarlane is a 53 year old female who presents to clinic today for the following health issues:      Digestive problems since she had kidney stones in November.  \"Having a hard time keeping food down\".   She has nausea on a daily basis.  She has occasional vomiting, acid reflux.   Her appetite is decreased.  She has lost 9# since November.  Some bilateral low pelvic pain.  Her periods are irregular and can last as long as 9 days and can be heavy.  Alternating loose stools and diarrhea.  No melena or hematochezia.  No dysuria; frequency is an ongoing issue, no hematuria.   She is trying Benadryl which is helping her stomach a bit.  She has tried OTC antiacids as needed.    Her CT scan from November showed cholelithiasis, no cholecystitis.       Anxiety  Her anxiety has been worse.  She has a lot of stress going on in her life.  She is currently on Lexapro 20 mg daily and Buspar 15 mg TID.            Problem list and histories reviewed & adjusted, as indicated.  Additional history: as documented        Reviewed and updated as needed this visit by clinical staff  Allergies       Reviewed and updated as needed this visit by Provider         ROS:  CONSTITUTIONAL: NEGATIVE for fever, chills, change in weight  ENT/MOUTH: NEGATIVE for ear, mouth and throat problems  RESP: NEGATIVE for significant cough or SOB  CV: NEGATIVE for chest pain, palpitations or peripheral edema  GI: see HPI  : see HPI  MUSCULOSKELETAL: NEGATIVE for significant arthralgias or myalgia  NEURO: NEGATIVE for weakness, dizziness or paresthesias  ENDOCRINE: NEGATIVE for temperature intolerance, skin/hair changes  PSYCHIATRIC: see HPI    OBJECTIVE:     /85  Pulse 86  Temp 97.9  F (36.6  C) (Oral)  Ht 5' 4\" (1.626 m)  Wt 143 lb (64.9 kg)  SpO2 97%  Breastfeeding? No  BMI 24.55 kg/m2  Body mass index is 24.55 kg/(m^2).  GENERAL: healthy, alert and no distress  RESP: lungs clear to auscultation - no rales, rhonchi or " wheezes  CV: regular rate and rhythm, normal S1 S2, no S3 or S4, no murmur, click or rub, no peripheral edema and peripheral pulses strong  ABDOMEN: soft, mild lower abdominal tenderness, no hepatosplenomegaly, no masses and bowel sounds normal  MS: no gross musculoskeletal defects noted, no edema  SKIN: no suspicious lesions or rashes  PSYCH: mentation appears normal, affect anxious        ASSESSMENT/PLAN:             1. Nausea  Will start Omeprazole daily, obtain RUQ US to better evaluate findings on CT.  Consider referral to surgeon vs referral to GI.  Will rule out pancreatitis, h pylori, check LFTs.  If symptoms are improving on Omeprazole, will follow up in one month.   - MA SCREENING DIGITAL BILAT - Future  (s+30); Future  - Comprehensive metabolic panel  - CBC with platelets differential  - omeprazole (PRILOSEC) 40 MG capsule; Take 1 capsule (40 mg) by mouth daily Take 30-60 minutes before a meal.  Dispense: 30 capsule; Refill: 1  - Lipase   - Amylase  - H Pylori antigen, stool; Future  - US Abdomen Limited; Future  - US Pelvic Complete w Transvaginal; Future    2. Lower abdominal pain  She does have a history of uterine fibroids.  Will obtain a pelvic US to evaluate pelvic pain.   - MA SCREENING DIGITAL BILAT - Future  (s+30); Future  - Comprehensive metabolic panel  - CBC with platelets differential  - omeprazole (PRILOSEC) 40 MG capsule; Take 1 capsule (40 mg) by mouth daily Take 30-60 minutes before a meal.  Dispense: 30 capsule; Refill: 1  - Lipase  - Amylase  - H Pylori antigen, stool; Future  - US Abdomen Limited; Future  - US Pelvic Complete w Transvaginal; Future  - *UA reflex to Microscopic    3. Calculus of gallbladder without cholecystitis without obstruction  See above.   - MA SCREENING DIGITAL BILAT - Future  (s+30); Future  - Comprehensive metabolic panel  - CBC with platelets differential  - omeprazole (PRILOSEC) 40 MG capsule; Take 1 capsule (40 mg) by mouth daily Take 30-60 minutes before a  meal.  Dispense: 30 capsule; Refill: 1  - Lipase  - Amylase  - H Pylori antigen, stool; Future  - US Abdomen Limited; Future  - US Pelvic Complete w Transvaginal; Future    4. Anxiety  Discussed options such as increasing Buspar, changing Lexapro.  She prefers to first evaluate her GI symptoms and see if the anxiety improves.  Follow up in 3-4 weeks or sooner if needed.           Janey Aguirre NP  [facility]

## 2018-02-22 NOTE — MR AVS SNAPSHOT
"              After Visit Summary   2/22/2018    Iliana Mcfarlane    MRN: [id number]           Patient Information     Date Of Birth          1965        Visit Information        Provider Department      2/22/2018 8:00 AM Janey Aguirre NP [facility]        Today's Diagnoses     Nausea    -  1    Lower abdominal pain        Calculus of gallbladder without cholecystitis without obstruction        Anxiety           Follow-ups after your visit        Your next 10 appointments already scheduled     Mar 12, 2018  4:10 PM CDT   (Arrive by 3:55 PM)   MA SCREENING DIGITAL BILATERAL with [id number]   [facility] ([facility])    [address] [place] [address], [address]  [facility] [address] [phone]           Do not use any powder, lotion or deodorant under your arms or on your breast. If you do, we will ask you to remove it before your exam.  Wear comfortable, two-piece clothing.  If you have any allergies, tell your care team.  Bring any previous mammograms from other facilities or have them mailed to the breast center. Three-dimensional (3D) mammograms are available at [place] locations in [facility], [facility], [place], [place], and [place]. [facility] locations include [facility] in [place]. Benefits of 3D mammograms include: - Improved rate of cancer detection - Decreases your chance of having to go back for more tests, which means fewer: - \"False-positive\" results (This means that there is an abnormal area but it isn't cancer.) - Invasive testing procedures, such as a biopsy or surgery - Can provide clearer images of the breast if you have dense breast tissue. 3D mammography is an optional exam that anyone can have with a 2D mammogram. It doesn't replace or take the place of a 2D mammogram. 2D mammograms remain an effective screening test for all women.  Not all insurance companies cover " "the cost of a 3D mammogram. Check with your insurance.              Future tests that were ordered for you today     Open Future Orders        Priority Expected Expires Ordered    H Pylori antigen, stool Routine  3/24/2018 2/22/2018    US Abdomen Limited Routine  2/22/2019 2/22/2018    US Pelvic Complete w Transvaginal Routine  2/22/2019 2/22/2018    MA SCREENING DIGITAL BILAT - Future  (s+30) Routine  2/22/2019 2/22/2018            Who to contact     If you have questions or need follow up information about today's clinic visit or your schedule please contact [facility] directly at [phone].  Normal or non-critical lab and imaging results will be communicated to you by QualiSystemshart, letter or phone within 4 business days after the clinic has received the results. If you do not hear from us within 7 days, please contact the clinic through Georamat or phone. If you have a critical or abnormal lab result, we will notify you by phone as soon as possible.  Submit refill requests through Airware or call your pharmacy and they will forward the refill request to us. Please allow 3 business days for your refill to be completed.          Additional Information About Your Visit        QualiSystemsharPubGame Information     Airware gives you secure access to your electronic health record. If you see a primary care provider, you can also send messages to your care team and make appointments. If you have questions, please call your primary care clinic.  If you do not have a primary care provider, please call [phone] and they will assist you.        Care EveryWhere ID     This is your Care EveryWhere ID. This could be used by other organizations to access your [place] medical records  [id number]        Your Vitals Were     Pulse Temperature Height Pulse Oximetry Breastfeeding? BMI (Body Mass Index)    86 97.9  F (36.6  C) (Oral) 5' 4\" (1.626 m) 97% No 24.55 kg/m2       Blood Pressure from Last 3 Encounters:   02/22/18 " 117/85   12/08/17 100/72   11/30/17 (!) 151/98    Weight from Last 3 Encounters:   02/22/18 143 lb (64.9 kg)   12/08/17 149 lb (67.6 kg)   11/30/17 152 lb 12.5 oz (69.3 kg)              We Performed the Following     *UA reflex to Microscopic     Amylase     CBC with platelets differential     Comprehensive metabolic panel     Lipase          Today's Medication Changes          These changes are accurate as of 2/22/18  8:56 AM.  If you have any questions, ask your nurse or doctor.               Start taking these medicines.        Dose/Directions    omeprazole 40 MG capsule   Commonly known as:  priLOSEC   Used for:  Nausea, Lower abdominal pain, Calculus of gallbladder without cholecystitis without obstruction   Started by:  Janey Aguirre NP        Dose:  40 mg   Take 1 capsule (40 mg) by mouth daily Take 30-60 minutes before a meal.   Quantity:  30 capsule   Refills:  1         These medicines have changed or have updated prescriptions.        Dose/Directions    busPIRone 15 MG tablet   Commonly known as:  BUSPAR   This may have changed:    - when to take this  - reasons to take this   Used for:  Anxiety        Dose:  15 mg   Take 1 tablet (15 mg) by mouth 3 times daily   Quantity:  270 tablet   Refills:  3       clindamycin 150 MG capsule   Commonly known as:  CLEOCIN   This may have changed:    - when to take this  - reasons to take this  - additional instructions   Used for:  Chronic maxillary sinusitis, Chronic rhinitis, Nasal turbinate hypertrophy, Deviated nasal septum, Uncomplicated asthma, unspecified asthma severity        Open one capsule and place in 1 Litre of Normal Saline and mix. Irrigate with 20 cc each nostril QID.   Quantity:  10 capsule   Refills:  3       escitalopram 20 MG tablet   Commonly known as:  LEXAPRO   This may have changed:  when to take this   Used for:  Anxiety        Dose:  20 mg   Take 1 tablet (20 mg) by mouth daily   Quantity:  90 tablet   Refills:  PRN       sodium  chloride 0.9 % Soln   This may have changed:    - when to take this  - reasons to take this  - additional instructions   Used for:  Chronic maxillary sinusitis, Chronic rhinitis, Nasal turbinate hypertrophy, Deviated nasal septum, Uncomplicated asthma, unspecified asthma severity        Irrigate 20 cc each nostril QID X 2 month supply   Quantity:  48723 mL   Refills:  3         Stop taking these medicines if you haven't already. Please contact your care team if you have questions.     VITAMIN B 12 PO   Stopped by:  Janey Aguirre NP           VITAMIN D (CHOLECALCIFEROL) PO   Stopped by:  Janey Aguirre NP           VITAMIN K PO   Stopped by:  Janey Aguirre NP                Where to get your medicines      These medications were sent to [facility] [address] [place] [id number]     Phone:  [phone]     omeprazole 40 MG capsule                Primary Care Provider Office Phone # Fax #    Janey Aguirre [phone] [address] [facility] [address]        Equal Access to Services     [facility] : Hadii aad ku hadasho Soomaali, waaxda luqadaha, qaybta kaalmada adeegyada, jovany fitzgerald hayleon fernández . So [facility] [phone].    ATENCIÓN: Si habla español, tiene a case disposición servicios gratuitos de asistencia lingüística. [facility] [phone].    We comply with applicable federal civil rights laws and [place] laws. We do not discriminate on the basis of race, color, national origin, age, disability, sex, sexual orientation, or gender identity.            Thank you!     Thank you for choosing [facility]  for your care. Our goal is always to provide you with excellent care. Hearing back from our patients is one way we can continue to improve our services. Please take a few minutes to complete the written survey that you may receive in  the mail after your visit with us. Thank you!             Your Updated Medication List - Protect others around you: Learn how to safely use, store and throw away your medicines at www.disposemymeds.org.          This list is accurate as of 2/22/18  8:56 AM.  Always use your most recent med list.                   Brand Name Dispense Instructions for use Diagnosis    Beclomethasone Dipropionate 80 MCG/ACT Aers Nasal Spray    QNASL    8.7 g    Spray 2 sprays into both nostrils daily    Allergic rhinitis caused by mold       busPIRone 15 MG tablet    BUSPAR    270 tablet    Take 1 tablet (15 mg) by mouth 3 times daily    Anxiety       CALCIUM-MAGNESIUM-ZINC PO      Take 1 tablet by mouth every evening        clindamycin 150 MG capsule    CLEOCIN    10 capsule    Open one capsule and place in 1 Litre of Normal Saline and mix. Irrigate with 20 cc each nostril QID.    Chronic maxillary sinusitis, Chronic rhinitis, Nasal turbinate hypertrophy, Deviated nasal septum, Uncomplicated asthma, unspecified asthma severity       escitalopram 20 MG tablet    LEXAPRO    90 tablet    Take 1 tablet (20 mg) by mouth daily    Anxiety       ibuprofen 100 MG Tabs      Take 500 mg by mouth as needed        loratadine 10 MG tablet    CLARITIN     Take 10 mg by mouth as needed Reported on 3/22/2017        OMEGA-3 FISH OIL PO      Take 4 capsules by mouth every evening        omeprazole 40 MG capsule    priLOSEC    30 capsule    Take 1 capsule (40 mg) by mouth daily Take 30-60 minutes before a meal.    Nausea, Lower abdominal pain, Calculus of gallbladder without cholecystitis without obstruction       ondansetron 4 MG ODT tab    ZOFRAN ODT    20 tablet    Take 1-2 tablets (4-8 mg) by mouth 3 times daily (before meals)    Right flank pain, Calculus of kidney       sodium chloride 0.9 % Soln     83068 mL    Irrigate 20 cc each nostril QID X 2 month supply    Chronic maxillary sinusitis, Chronic rhinitis, Nasal turbinate hypertrophy, Deviated  nasal septum, Uncomplicated asthma, unspecified asthma severity       sodium chloride 0.9% (bottle) 0.9 % irrigation     1000 mL    IRRIGATE 20 ML EACH NOSTRIL 4 TIMES DAILY FOR 2 MONTHS.    Chronic maxillary sinusitis       tamsulosin 0.4 MG capsule    FLOMAX    30 capsule    Take 1 capsule (0.4 mg) by mouth daily    Right flank pain, Calculus of kidney       traZODone 50 MG tablet    DESYREL    180 tablet    Take 2 tablets (100 mg) by mouth nightly as needed for sleep    Primary insomnia       XIIDRA 5 % Soln opthalmic solution   Generic drug:  Lifitegrast      Apply 1 drop to eye 2 times daily        zolpidem 6.25 MG CR tablet    AMBIEN CR    90 tablet    Take 1 tablet (6.25 mg) by mouth nightly as needed for sleep    Primary insomnia

## 2018-02-23 ASSESSMENT — PATIENT HEALTH QUESTIONNAIRE - PHQ9: SUM OF ALL RESPONSES TO PHQ QUESTIONS 1-9: 15

## 2018-03-12 ENCOUNTER — HOSPITAL ENCOUNTER (OUTPATIENT)
Dept: MAMMOGRAPHY | Facility: CLINIC | Age: 53
Discharge: HOME | End: 2018-03-12
Attending: NURSE PRACTITIONER | Admitting: NURSE PRACTITIONER
Payer: COMMERCIAL

## 2018-03-12 DIAGNOSIS — R10.30 LOWER ABDOMINAL PAIN: ICD-10-CM

## 2018-03-12 DIAGNOSIS — K80.20 CALCULUS OF GALLBLADDER WITHOUT CHOLECYSTITIS WITHOUT OBSTRUCTION: ICD-10-CM

## 2018-03-12 DIAGNOSIS — R11.0 NAUSEA: ICD-10-CM

## 2018-03-12 PROCEDURE — 77067 SCR MAMMO BI INCL CAD: CPT

## 2018-03-23 DIAGNOSIS — F51.01 PRIMARY INSOMNIA: ICD-10-CM

## 2018-03-23 NOTE — TELEPHONE ENCOUNTER
Zolpidem      Last Written Prescription Date:  7/19/17  Last Fill Quantity: 90,   # refills: 1  Last Office Visit: 2/22/18  Future Office visit:       Routing refill request to provider for review/approval because:  Drug not on the [facility], P or [facility] refill protocol or controlled substance

## 2018-03-26 RX ORDER — ZOLPIDEM TARTRATE 6.25 MG/1
6.25 TABLET, FILM COATED, EXTENDED RELEASE ORAL
Qty: 90 TABLET | Refills: 1 | Status: SHIPPED | OUTPATIENT
Start: 2018-03-26 | End: 2019-01-21

## 2018-03-27 NOTE — TELEPHONE ENCOUNTER
Faxed Rx for zolpidem ( AMBIEN CR) 6.25 MG CR tablets to [facility]. Fax:[phone]    Melba Musa MA

## 2018-04-05 DIAGNOSIS — R10.30 LOWER ABDOMINAL PAIN: ICD-10-CM

## 2018-04-05 DIAGNOSIS — K80.20 CALCULUS OF GALLBLADDER WITHOUT CHOLECYSTITIS WITHOUT OBSTRUCTION: ICD-10-CM

## 2018-04-05 DIAGNOSIS — R11.0 NAUSEA: ICD-10-CM

## 2018-04-05 NOTE — TELEPHONE ENCOUNTER
"[facility] will not transfer Rx's.  They require a new Rx.  Thanks      Requested Prescriptions   Pending Prescriptions Disp Refills     omeprazole (PRILOSEC) 40 MG capsule  Last Written Prescription Date:  3-21-18  Last Fill Quantity: 90 cap,  # refills: 0   Last office visit: 2-22-18 with prescribing provider:  Nathalie Aguirre    Future Office Visit:    90 capsule 0     Sig: Take 1 capsule (40 mg) by mouth daily Take 30-60 minutes before a meal.    PPI Protocol Passed    4/5/2018  9:06 AM       Passed - Not on Clopidogrel (unless Pantoprazole ordered)       Passed - No diagnosis of osteoporosis on record       Passed - Recent (12 mo) or future (30 days) visit within the authorizing provider's specialty    Patient had office visit in the last 12 months or has a visit in the next 30 days with authorizing provider or within the authorizing provider's specialty.  See \"Patient Info\" tab in inbasket, or \"Choose Columns\" in Meds & Orders section of the refill encounter.           Passed - Patient is age 18 or older       Passed - No active pregnacy on record       Passed - No positive pregnancy test in past 12 months          "

## 2018-04-09 RX ORDER — OMEPRAZOLE 40 MG/1
40 CAPSULE, DELAYED RELEASE ORAL DAILY
Qty: 90 CAPSULE | Refills: 0 | Status: SHIPPED | OUTPATIENT
Start: 2018-04-09 | End: 2018-07-10

## 2018-04-09 NOTE — TELEPHONE ENCOUNTER
Prescription approved per [facility] [place].  Sirena Glass RN

## 2018-04-18 ENCOUNTER — RADIANT APPOINTMENT (OUTPATIENT)
Dept: ULTRASOUND IMAGING | Facility: CLINIC | Age: 53
End: 2018-04-18
Attending: NURSE PRACTITIONER
Payer: COMMERCIAL

## 2018-04-18 DIAGNOSIS — K80.20 CALCULUS OF GALLBLADDER WITHOUT CHOLECYSTITIS WITHOUT OBSTRUCTION: ICD-10-CM

## 2018-04-18 DIAGNOSIS — R10.30 LOWER ABDOMINAL PAIN: ICD-10-CM

## 2018-04-18 DIAGNOSIS — R11.0 NAUSEA: ICD-10-CM

## 2018-04-19 ENCOUNTER — TELEPHONE (OUTPATIENT)
Dept: FAMILY MEDICINE | Facility: CLINIC | Age: 53
End: 2018-04-19

## 2018-04-19 DIAGNOSIS — R11.0 NAUSEA: Primary | ICD-10-CM

## 2018-04-19 DIAGNOSIS — N92.6 IRREGULAR MENSTRUAL CYCLE: ICD-10-CM

## 2018-04-19 DIAGNOSIS — D13.5 ADENOMYOMATOSIS OF GALLBLADDER: ICD-10-CM

## 2018-04-19 NOTE — TELEPHONE ENCOUNTER
Discussed results with pt  She denies abnormal bleeding- but endorses pelvic discomfort ever since she had the kidney stone    Referral info to [facility] given and Referral faxed to [phone]  Angela Rico RN

## 2018-04-19 NOTE — TELEPHONE ENCOUNTER
I'm covering results that came in today as DOD:  Please relay the results of her recent ultrasounds:  Pelvic ultrasound showed a small fibroid, which is benign; if she were having unusual uterine bleeding, then I would say to f/u with ob/gyn.  If not, nothing needs to be done.     Abdominal US showed adenomyomatosis of the gallbladder, which means that areas of the gallbladder are thickened and abnormal.  I would recommend that she f/u with GI regarding these results to see whether further testing is needed for this or for the symptoms for which she had the test (she saw Nathalie two months ago for nausea).      I will put in referrals both for OB/GYN and GI.  Please have her schedule as indicated.

## 2018-05-03 ENCOUNTER — TRANSFERRED RECORDS (OUTPATIENT)
Dept: HEALTH INFORMATION MANAGEMENT | Facility: CLINIC | Age: 53
End: 2018-05-03

## 2018-05-03 ENCOUNTER — MEDICAL CORRESPONDENCE (OUTPATIENT)
Dept: HEALTH INFORMATION MANAGEMENT | Facility: CLINIC | Age: 53
End: 2018-05-03

## 2018-05-11 DIAGNOSIS — F41.9 ANXIETY: ICD-10-CM

## 2018-05-12 NOTE — TELEPHONE ENCOUNTER
"Requested Prescriptions   Pending Prescriptions Disp Refills     busPIRone (BUSPAR) 15 MG tablet [Pharmacy Med Name: BUSPIRONE HCL TABS 15MG]  Last Written Prescription Date:  4/11/2017  Last Fill Quantity: 270 tabs,  # refills: 3   Last office visit: 2/22/2018 with prescribing provider:  Timothy   Future Office Visit:     270 tablet 3     Sig: TAKE 1 TABLET THREE TIMES A DAY    Atypical Antidepressants Protocol Passed    5/11/2018  9:36 PM       Passed - Recent (12 mo) or future (30 days) visit within the authorizing provider's specialty    Patient had office visit in the last 12 months or has a visit in the next 30 days with authorizing provider or within the authorizing provider's specialty.  See \"Patient Info\" tab in inbasket, or \"Choose Columns\" in Meds & Orders section of the refill encounter.           Passed - Patient is age 18 or older       Passed - No active pregnancy on record       Passed - No positive pregnancy test in past 12 mos          "

## 2018-05-17 RX ORDER — BUSPIRONE HYDROCHLORIDE 15 MG/1
TABLET ORAL
Qty: 270 TABLET | Refills: 2 | Status: SHIPPED | OUTPATIENT
Start: 2018-05-17 | End: 2019-11-13

## 2018-07-09 ENCOUNTER — TELEPHONE (OUTPATIENT)
Dept: SURGERY | Facility: CLINIC | Age: 53
End: 2018-07-09

## 2018-07-09 NOTE — TELEPHONE ENCOUNTER
Pre Visit Call and Assessment    Date of call:  07/09/2018    Phone numbers:  Home number on file [phone] (home)    Reached patient/confirmed appointment:  No - left message:   on voicemail  Patient care team/Primary provider:  Janey Aguirre  Referred to:  Dr. Reggie Coffey    Reason for visit:  Adenomyomatosis of Gallbladder consult

## 2018-07-10 ENCOUNTER — OFFICE VISIT (OUTPATIENT)
Dept: SURGERY | Facility: CLINIC | Age: 53
End: 2018-07-10
Payer: COMMERCIAL

## 2018-07-10 ENCOUNTER — DOCUMENTATION ONLY (OUTPATIENT)
Dept: SURGERY | Facility: CLINIC | Age: 53
End: 2018-07-10

## 2018-07-10 VITALS
BODY MASS INDEX: 25.27 KG/M2 | TEMPERATURE: 98.8 F | OXYGEN SATURATION: 97 % | HEART RATE: 104 BPM | HEIGHT: 64 IN | SYSTOLIC BLOOD PRESSURE: 116 MMHG | WEIGHT: 148 LBS | DIASTOLIC BLOOD PRESSURE: 78 MMHG

## 2018-07-10 DIAGNOSIS — R11.0 NAUSEA: ICD-10-CM

## 2018-07-10 DIAGNOSIS — K80.20 CALCULUS OF GALLBLADDER WITHOUT CHOLECYSTITIS WITHOUT OBSTRUCTION: ICD-10-CM

## 2018-07-10 DIAGNOSIS — K80.20 GALLSTONES: Primary | ICD-10-CM

## 2018-07-10 DIAGNOSIS — R10.30 LOWER ABDOMINAL PAIN: ICD-10-CM

## 2018-07-10 RX ORDER — CEFAZOLIN SODIUM 1 G/50ML
1 INJECTION, SOLUTION INTRAVENOUS SEE ADMIN INSTRUCTIONS
Status: CANCELLED | OUTPATIENT
Start: 2018-07-10

## 2018-07-10 ASSESSMENT — ENCOUNTER SYMPTOMS
ALTERED TEMPERATURE REGULATION: 0
VOMITING: 0
DEPRESSION: 1
SHORTNESS OF BREATH: 0
POLYPHAGIA: 0
SNORES LOUDLY: 1
HEARTBURN: 1
COUGH: 1
NIGHT SWEATS: 1
SORE THROAT: 1
CHILLS: 0
SMELL DISTURBANCE: 0
STIFFNESS: 0
DIARRHEA: 1
FEVER: 0
SWOLLEN GLANDS: 1
TASTE DISTURBANCE: 0
POSTURAL DYSPNEA: 0
MUSCLE CRAMPS: 0
INSOMNIA: 1
HALLUCINATIONS: 0
MYALGIAS: 1
BOWEL INCONTINENCE: 0
ARTHRALGIAS: 0
JAUNDICE: 0
NECK PAIN: 1
HEMATURIA: 0
BRUISES/BLEEDS EASILY: 1
DYSURIA: 0
WEIGHT LOSS: 0
NECK MASS: 0
SINUS CONGESTION: 1
SINUS PAIN: 1
DECREASED CONCENTRATION: 1
COUGH DISTURBING SLEEP: 1
ABDOMINAL PAIN: 0
JOINT SWELLING: 0
NAUSEA: 1
DIFFICULTY URINATING: 0
FATIGUE: 1
NERVOUS/ANXIOUS: 1
BACK PAIN: 1
PANIC: 0
DYSPNEA ON EXERTION: 0
HEMOPTYSIS: 0
INCREASED ENERGY: 1
FLANK PAIN: 0
DECREASED APPETITE: 1
WHEEZING: 0
POLYDIPSIA: 0
SPUTUM PRODUCTION: 1
CONSTIPATION: 0
BLOOD IN STOOL: 0
WEIGHT GAIN: 1
HOARSE VOICE: 1
TROUBLE SWALLOWING: 0
BLOATING: 0
MUSCLE WEAKNESS: 1
RECTAL PAIN: 0

## 2018-07-10 ASSESSMENT — PAIN SCALES - GENERAL: PAINLEVEL: NO PAIN (0)

## 2018-07-10 NOTE — NURSING NOTE
Pre and Post op Patient Education/Teaching Flowsheet  Relevant Diagnosis:  Gallbladder  Teaching Topic:  Pre and post op teaching  Person(s) Involved in teaching:  Patient     Motivation Level:  Asks Questions:  Yes  Eager to Learn:  Yes  Cooperative:  Yes  Receptive (willing/able to accept information):  Yes  Any cultural factors/Muslim beliefs that may influence understanding or compliance?  No    Patient/caregiver/family demonstrates understanding of the following:  Reason for the appointment, diagnosis, and treatment plan:  Yes  Patient demonstrates understanding of the following:  Pre-op bowel prep:  No  Post-op pain management recommendations (medications, ice compress, binder/athletic supporter (if applicable), etc.:  Yes  Inguinal hernia patients:  Post-op urinary retention- discussed signs/symptoms and visit to ER for Pablo catheter placement and to stay in place for at least 48 hours:  NA  Restrictions:  Yes  Medications to take the day of surgery:  Per PCP  Blood thinner medications discussed and when to stop (if applicable):  Yes  Wound care:  Yes  Diabetes medication management (if applicable):  Per PCP  Which situations necessitate calling provider and whom to contact:  Discussed how to contact the hospital, nurse, and clinic scheduling staff if necessary      Date and time of surgery:  Yes  Location of surgery:  [facility] [place]- 5th Floor  History and Physical and any other testing necessary prior to surgery:  Yes  Required time line for completion of History and Physical and any pre-op testing:  Yes  Discuss need for someone to drive patient home and stay with them for 24 hours:  Yes  Pre-op showering/scrub information with Surgical Scrub:  Yes  NPO Guidelines:  NPO per Anesthesia Guidelines    Infection Prevention: Patient demonstrates understanding of the following:  Patient instructed on hand hygiene:  Yes  Surgical procedure site care will be taught and will be  reviewed at the time of discharge  Signs and symptoms of infection taught:  Yes  Wound care reviewed and will be taught at the time of discharge  Central venous catheter care will be taught at the time of discharge (if applicable)    Post-op follow-up:  Instructional materials used/given/mailed:  [facility] Booklet, post op teaching sheet, Map, Soap, and arrival/location information    Surgical instructions mailed to patient

## 2018-07-10 NOTE — PATIENT INSTRUCTIONS
After your Laparoscopic Cholecystectomy          Incision care     You may take a shower the day after surgery. Carefully wash your incision with soap and water. Do not submerge yourself in water (bath, whirlpool, hot tub, pool, lake) for 14 days after surgery.     Remove the bandage the day after surgery, but leave the medical tape (Steri-Strips) or glue in place. These will loosen and fall off on their own 5 to 7 days after surgery.       Always wash your hands before touching your incisions or removing bandages.      Other medicines     Wait to start aspirin or blood thinners until the day after surgery. You can continue your regular medicines at your normal time the day after surgery.      Your pain medicine may cause constipation (hard, dry stools). To help with this, take the stool softener your doctor gave you or an over-the-counter stool softener or laxative. You can stop taking this when you are no longer taking pain medicine and your bowel movements are back to normal.      For pain or discomfort     Take the narcotic pain medicine your doctor gave you as needed and as instructed on the bottle. If you prefer to use over-the-counter medication, use acetaminophen (Tylenol) or ibuprofen (Advil, Motrin) as instructed on the box. Do not take Tylenol if it is in your narcotic pain medication.     Use an ice pack on your abdomen (belly) for 20 minutes at a time as needed for the first 24 hours. Be sure to protect your skin by putting a cloth between the ice pack and your skin.     After 24 hours you can switch to heat for 20 minutes as needed. Be sure to protect your skin by putting a cloth between the heat pack and your skin.       Activities     No driving until you feel it s safe to do so. Don t drive while taking narcotic pain medicine.     Don t lift anything heavier than 20 pounds for 3 to 4 weeks after surgery.      Special equipment     If you left the hospital in PARI socks (compression stockings), you  may take them off the day after surgery.      Diet     You can eat your regular meals after surgery.      When to call the doctor   Call your doctor if you have:     A fever above 101 F (38.3 C) (taken under the tongue), or a fever or chills lasting more than a day.     Redness at the incision site.     Any fluid or blood draining from the incision, especially if it smells bad.      Severe pain that doesn t improve with pain medicine.          We will call you 2 to 4 days after surgery to review this handout, answer questions and help arrange after-surgery care. If you have questions or concerns, please call [phone] during regular office hours. If you need to call after business hours, call [phone] and ask to page the surgeon on-call.         Transversus Abdominis Plane (TAP) Pain Block      What is a TAP block?   A TAP block can help you manage your pain after surgery. TAP stands for transversus abdominis plane, which is a muscle layer in your abdomen (belly). The TAP block uses numbing medicine similar to Novocaine to block pain near the site of your surgery.       Why get a TAP block?     To better manage your pain after surgery. A tap block will help keep the pain from getting severe and out of control.     To block pain signals from the nerve, which helps decrease pain after surgery.     To help you sleep, easily breathe deeply, walk and visit with others.      How is it done?   You will lie still on a table. We will use an ultrasound machine to help us see the correct muscle layer of your abdomen. Then, we ll use a needle to inject the medicine. We may also give you some sleep medicine to lessen the pain of the injection.       The procedure takes between 5 and 15 minutes. It is usually done right before surgery, but will sometimes be after. It depends on your surgery and care needs.      What can I expect?     You may feel numbness, tingling or a heaviness in your abdomen.      You may have pain  control up to 72 hours after surgery.     The TAP block may not lessen all of your surgery pain. But most patients feel 50 to 75 percent less pain than without the block.       Tell your nurse if you have:     Numbness or tingling in areas other than where the injection was     Blurry vision     Ringing in your ears     A metallic taste in your mouth

## 2018-07-10 NOTE — MR AVS SNAPSHOT
After Visit Summary   7/10/2018    Iliana Mcfarlane    MRN: [id number]           Patient Information     Date Of Birth          1965        Visit Information        Provider Department      7/10/2018 9:30 AM Reggie Coffey MD [facility] Surgery        Today's Diagnoses     Gallstones    -  1      Care Instructions    After your Laparoscopic Cholecystectomy          Incision care     You may take a shower the day after surgery. Carefully wash your incision with soap and water. Do not submerge yourself in water (bath, whirlpool, hot tub, pool, lake) for 14 days after surgery.     Remove the bandage the day after surgery, but leave the medical tape (Steri-Strips) or glue in place. These will loosen and fall off on their own 5 to 7 days after surgery.       Always wash your hands before touching your incisions or removing bandages.      Other medicines     Wait to start aspirin or blood thinners until the day after surgery. You can continue your regular medicines at your normal time the day after surgery.      Your pain medicine may cause constipation (hard, dry stools). To help with this, take the stool softener your doctor gave you or an over-the-counter stool softener or laxative. You can stop taking this when you are no longer taking pain medicine and your bowel movements are back to normal.      For pain or discomfort     Take the narcotic pain medicine your doctor gave you as needed and as instructed on the bottle. If you prefer to use over-the-counter medication, use acetaminophen (Tylenol) or ibuprofen (Advil, Motrin) as instructed on the box. Do not take Tylenol if it is in your narcotic pain medication.     Use an ice pack on your abdomen (belly) for 20 minutes at a time as needed for the first 24 hours. Be sure to protect your skin by putting a cloth between the ice pack and your skin.     After 24 hours you can switch to heat for 20 minutes as needed. Be sure to protect  your skin by putting a cloth between the heat pack and your skin.       Activities     No driving until you feel it s safe to do so. Don t drive while taking narcotic pain medicine.     Don t lift anything heavier than 20 pounds for 3 to 4 weeks after surgery.      Special equipment     If you left the hospital in PARI socks (compression stockings), you may take them off the day after surgery.      Diet     You can eat your regular meals after surgery.      When to call the doctor   Call your doctor if you have:     A fever above 101 F (38.3 C) (taken under the tongue), or a fever or chills lasting more than a day.     Redness at the incision site.     Any fluid or blood draining from the incision, especially if it smells bad.      Severe pain that doesn t improve with pain medicine.          We will call you 2 to 4 days after surgery to review this handout, answer questions and help arrange after-surgery care. If you have questions or concerns, please call [phone] during regular office hours. If you need to call after business hours, call [phone] and ask to page the surgeon on-call.         Transversus Abdominis Plane (TAP) Pain Block      What is a TAP block?   A TAP block can help you manage your pain after surgery. TAP stands for transversus abdominis plane, which is a muscle layer in your abdomen (belly). The TAP block uses numbing medicine similar to Novocaine to block pain near the site of your surgery.       Why get a TAP block?     To better manage your pain after surgery. A tap block will help keep the pain from getting severe and out of control.     To block pain signals from the nerve, which helps decrease pain after surgery.     To help you sleep, easily breathe deeply, walk and visit with others.      How is it done?   You will lie still on a table. We will use an ultrasound machine to help us see the correct muscle layer of your abdomen. Then, we ll use a needle to inject the medicine. We may  also give you some sleep medicine to lessen the pain of the injection.       The procedure takes between 5 and 15 minutes. It is usually done right before surgery, but will sometimes be after. It depends on your surgery and care needs.      What can I expect?     You may feel numbness, tingling or a heaviness in your abdomen.      You may have pain control up to 72 hours after surgery.     The TAP block may not lessen all of your surgery pain. But most patients feel 50 to 75 percent less pain than without the block.       Tell your nurse if you have:     Numbness or tingling in areas other than where the injection was     Blurry vision     Ringing in your ears     A metallic taste in your mouth            Follow-ups after your visit        Who to contact     Please call your clinic at [phone] to:    Ask questions about your health    Make or cancel appointments    Discuss your medicines    Learn about your test results    Speak to your doctor            Additional Information About Your Visit        [employer] Information     [employer] gives you secure access to your electronic health record. If you see a primary care provider, you can also send messages to your care team and make appointments. If you have questions, please call your primary care clinic.  If you do not have a primary care provider, please call [phone] and they will assist you.      [employer] is an electronic gateway that provides easy, online access to your medical records. With [employer], you can request a clinic appointment, read your test results, renew a prescription or communicate with your care team.     To access your existing account, please contact your [facility] or call [phone] for assistance.        Care EveryWhere ID     This is your Care EveryWhere ID. This could be used by other organizations to access your [facility] medical records  [id number]        Your Vitals Were     Pulse Temperature Height  "Pulse Oximetry BMI (Body Mass Index)       104 98.8  F (37.1  C) (Oral) 1.626 m (5' 4\") 97% 25.4 kg/m2        Blood Pressure from Last 3 Encounters:   07/10/18 116/78   02/22/18 117/85   12/08/17 100/72    Weight from Last 3 Encounters:   07/10/18 67.1 kg (148 lb)   02/22/18 64.9 kg (143 lb)   12/08/17 67.6 kg (149 lb)              We Performed the Following     Maria T-Operative Worksheet (Generic)          Today's Medication Changes          These changes are accurate as of 7/10/18 10:24 AM.  If you have any questions, ask your nurse or doctor.               These medicines have changed or have updated prescriptions.        Dose/Directions    clindamycin 150 MG capsule   Commonly known as:  CLEOCIN   This may have changed:    - when to take this  - reasons to take this  - additional instructions   Used for:  Chronic maxillary sinusitis, Chronic rhinitis, Nasal turbinate hypertrophy, Deviated nasal septum, Uncomplicated asthma, unspecified asthma severity        Open one capsule and place in 1 Litre of Normal Saline and mix. Irrigate with 20 cc each nostril QID.   Quantity:  10 capsule   Refills:  3       escitalopram 20 MG tablet   Commonly known as:  LEXAPRO   This may have changed:  when to take this   Used for:  Anxiety        Dose:  20 mg   Take 1 tablet (20 mg) by mouth daily   Quantity:  90 tablet   Refills:  PRN       sodium chloride 0.9 % Soln   This may have changed:    - when to take this  - reasons to take this  - additional instructions   Used for:  Chronic maxillary sinusitis, Chronic rhinitis, Nasal turbinate hypertrophy, Deviated nasal septum, Uncomplicated asthma, unspecified asthma severity        Irrigate 20 cc each nostril QID X 2 month supply   Quantity:  74448 mL   Refills:  3                Primary Care Provider Office Phone # Fax #    Janey Aguirre [phone] [address] [facility] [address]        Equal Access to Services     ASTER GONZALEZ AH: Harvey armenta " donny Barron, waprimoda lumeryladaha, qaybta kamic lucas, jovany funezberry kari. So [facility] [phone].    ATENCIÓN: Si habla farzad, tiene a case disposición servicios gratuitos de asistencia lingüística. Vargas al [phone].    We comply with applicable federal civil rights laws and [place] laws. We do not discriminate on the basis of race, color, national origin, age, disability, sex, sexual orientation, or gender identity.            Thank you!     Thank you for choosing [facility] SURGERY  for your care. Our goal is always to provide you with excellent care. Hearing back from our patients is one way we can continue to improve our services. Please take a few minutes to complete the written survey that you may receive in the mail after your visit with us. Thank you!             Your Updated Medication List - Protect others around you: Learn how to safely use, store and throw away your medicines at www.disposemymeds.org.          This list is accurate as of 7/10/18 10:24 AM.  Always use your most recent med list.                   Brand Name Dispense Instructions for use Diagnosis    Beclomethasone Dipropionate 80 MCG/ACT Aers Nasal Spray    QNASL    8.7 g    Spray 2 sprays into both nostrils daily    Allergic rhinitis caused by mold       busPIRone 15 MG tablet    BUSPAR    270 tablet    TAKE 1 TABLET THREE TIMES A DAY    Anxiety       CALCIUM-MAGNESIUM-ZINC PO      Take 1 tablet by mouth every evening        clindamycin 150 MG capsule    CLEOCIN    10 capsule    Open one capsule and place in 1 Litre of Normal Saline and mix. Irrigate with 20 cc each nostril QID.    Chronic maxillary sinusitis, Chronic rhinitis, Nasal turbinate hypertrophy, Deviated nasal septum, Uncomplicated asthma, unspecified asthma severity       escitalopram 20 MG tablet    LEXAPRO    90 tablet    Take 1 tablet (20 mg) by mouth daily    Anxiety       ibuprofen 100 MG Tabs      Take 500 mg by mouth as needed         loratadine 10 MG tablet    CLARITIN     Take 10 mg by mouth as needed Reported on 3/22/2017        OMEGA-3 FISH OIL PO      Take 4 capsules by mouth every evening        omeprazole 40 MG capsule    priLOSEC    90 capsule    Take 1 capsule (40 mg) by mouth daily Take 30-60 minutes before a meal.    Nausea, Lower abdominal pain, Calculus of gallbladder without cholecystitis without obstruction       ondansetron 4 MG ODT tab    ZOFRAN ODT    20 tablet    Take 1-2 tablets (4-8 mg) by mouth 3 times daily (before meals)    Right flank pain, Calculus of kidney       sodium chloride 0.9 % Soln     68414 mL    Irrigate 20 cc each nostril QID X 2 month supply    Chronic maxillary sinusitis, Chronic rhinitis, Nasal turbinate hypertrophy, Deviated nasal septum, Uncomplicated asthma, unspecified asthma severity       sodium chloride 0.9% (bottle) 0.9 % irrigation     1000 mL    IRRIGATE 20 ML EACH NOSTRIL 4 TIMES DAILY FOR 2 MONTHS.    Chronic maxillary sinusitis       tamsulosin 0.4 MG capsule    FLOMAX    30 capsule    Take 1 capsule (0.4 mg) by mouth daily    Right flank pain, Calculus of kidney       traZODone 50 MG tablet    DESYREL    180 tablet    Take 2 tablets (100 mg) by mouth nightly as needed for sleep    Primary insomnia       XIIDRA 5 % Soln opthalmic solution   Generic drug:  Lifitegrast      Apply 1 drop to eye 2 times daily        zolpidem 6.25 MG CR tablet    AMBIEN CR    90 tablet    Take 1 tablet (6.25 mg) by mouth nightly as needed for sleep    Primary insomnia

## 2018-07-10 NOTE — PROGRESS NOTES
Iliana Mcfarlane is a 53 year old female with a 9 month history of abdominal pain localized to the LLQ and RLQ.  Symptoms are not associated with fatty food intake.  Associated symptoms: nausea  Other constitutional symptoms: no  Common duct symptoms:  None  Diet tolerance:  good  Patient was not seen in the Emergency Room for these symptoms.  LFT's:  normal    Image studies included:  Ultrasound    Impression:   1. Adenomyomatosis of the gallbladder. Gallbladder fundus  calcification appears to be within the adenomyomatosis. Remainder of  the abdomen is within normal limits.    Findings:  Abnormal findings: adenomatosis with calcifications. Previous studies have noted stones or sludge.    Past medical and surgical history, medications, allergies, family history, and social history were reviewed with the patient. 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  Past Medical History:   Diagnosis Date     Allergic rhinitis      Allergic rhinitis, cause unspecified      Anxiety      Depression, major      Depressive disorder      Esophageal reflux      Genital herpes      Glaucoma      Hoarseness late      Lumbago      Menarche age 16     Menorrhagia      Nephrolithiasis      PONV (postoperative nausea and vomiting)      Reduced vision      Past Surgical History:   Procedure Laterality Date     C NONSPECIFIC PROCEDURE      Upper GI endoscopy     C NONSPECIFIC PROCEDURE      Tonsillectomy     C NONSPECIFIC PROCEDURE  85    Oral surgery, wisdom teeth     C NONSPECIFIC PROCEDURE           C NONSPECIFIC PROCEDURE      Hysteroscopy, ablation of uterine fibroid     C NONSPECIFIC PROCEDURE      miscarriage     CATARACT IOL, RT/LT       COMBINED CYSTOSCOPY, RETROGRADES, URETEROSCOPY, LASER HOLMIUM LITHOTRIPSY URETER(S), INSERT STENT Right 2017    Procedure: COMBINED CYSTOSCOPY, RETROGRADES, URETEROSCOPY, LASER HOLMIUM LITHOTRIPSY URETER(S), INSERT STENT;  Cystoscopy, Right Ureteroscopy with  Holmium Laser Lithotripsy, Right Stent Placement;  Surgeon: Helio Lauren MD;  Location: [facility] OR     ENT SURGERY      tonsillectomy     EYE SURGERY  2011    trabeculectomy     GI SURGERY      dairy intolerence - resolved     HEAD & NECK SURGERY      trabeculectomy, cataract, yag     OPERATIVE HYSTEROSCOPY WITH MORCELLATOR  7/25/2014    Procedure: OPERATIVE HYSTEROSCOPY WITH MORCELLATOR;  Surgeon: Maureen Tavarez MD;  Location: [facility] OR     SURGICAL HISTORY OF -   12/11    right eye trabeculectomy     TONSILLECTOMY  1987     ROS: 10 point review of systems negative except noted in HPI  PHYSICAL EXAM  General appearance- healthy, alert, and in no distress.  Skin- Skin color and turgor normal.  No obvious rashes.  Neck- Neck is supple without obvious adenopathy.  Lungs- Respiratory effort unlabored.  Gait- Normal.  Abdomen - soft non distended, non tender without obvious masses.        Impression:  Gallbladder: adenomatosis with calcifications. Previous studies have noted stones or sludge.  Recommendation:  Laparoscopic Cholecystectomy and a low to nonfat diet until the patient undergoes surgery.    A full discussion regarding the alternatives, risks, goals, and potential complications for this surgery was completed today.  The patient understood that the potential problems included but are not limited to:  Infection, bleeding, bile leak, injury to structures about the gallbladder, possible common duct stone requiring further procedures. Most current review of literature confirm the more common specific risks related to laparoscopic cholecystectomy include bile duct injury (3/1000), bile leak (10/1000), retained common bile duct stone (10/1000), postcholecystectomy diarrhea (1-2%) and these complications may require additional treatment.    The patient verbally expressed understanding, was given the opportunity for questions, and gives full informed consent for the procedure.      Today the patient was instructed  on the need for a preoperative H&P, NPO status prior to surgery, and the need to stop anticoagulants prior to surgery.  Additional educational material, soap, and instructions will be mailed out to the patients home.    The total time spent with this patient was 30 minutes.  Of this time, greater than 50% was spent counseling and coordinating care.      Lovenox:  No

## 2018-07-10 NOTE — PROGRESS NOTES
This  spoke with Iliana Gwendolyn Mcfarlane in clinic about surgery dates and she would like to have surgery as soon as possible since she is changing jobs on 08/01/2018. Confirmed time, date, arrival time and location for surgery on 07/16/2018 at 8:50 AM with Dr. Reggie Coffey. Reviewed with her that she would need someone with her after surgery to drive her home and for 24 hours after her surgery. She disclosed that it would be her friend. She confirmed that she would schedule a pre-op physical with Janey Aguirre NP. Patient is aware that she can call to schedule a pre-op with the [facility] ([facility]) if she is unable to schedule with her primary provider. She was provided with the fax number for the [facility].She also knows to call general surgery if she experiences any cold or flu symptoms. Surgery packet, teaching and soap were given to Iliana Gwendolyn Mcfarlane in clinic on 07/10/2018. She was asked to call [phone] if she needs to reschedule and [phone] if she experiences any symptoms.

## 2018-07-10 NOTE — LETTER
7/10/2018       RE: Iliana Mcfarlane  [address]  [facility] [place] [address]     Dear Colleague,    Thank you for referring your patient, Iliana Mcfarlane, to the [facility] GENERAL SURGERY at [facility]. Please see a copy of my visit note below.    Iliana Mcfarlane is a 53 year old female with a 9 month history of abdominal pain localized to the LLQ and RLQ.  Symptoms are not associated with fatty food intake.  Associated symptoms: nausea  Other constitutional symptoms: no  Common duct symptoms:  None  Diet tolerance:  good  Patient was not seen in the Emergency Room for these symptoms.  LFT's:  normal    Image studies included:  Ultrasound    Impression:   1. Adenomyomatosis of the gallbladder. Gallbladder fundus  calcification appears to be within the adenomyomatosis. Remainder of  the abdomen is within normal limits.    Findings:  Abnormal findings: adenomatosis with calcifications. Previous studies have noted stones or sludge.    Past medical and surgical history, medications, allergies, family history, and social history were reviewed with the patient. 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  Past Medical History:   Diagnosis Date     Allergic rhinitis late      Allergic rhinitis, cause unspecified      Anxiety      Depression, major      Depressive disorder      Esophageal reflux      Genital herpes      Glaucoma      Hoarseness late      Lumbago      Menarche age 16     Menorrhagia      Nephrolithiasis      PONV (postoperative nausea and vomiting)      Reduced vision      Past Surgical History:   Procedure Laterality Date     C NONSPECIFIC PROCEDURE      Upper GI endoscopy     C NONSPECIFIC PROCEDURE      Tonsillectomy     C NONSPECIFIC PROCEDURE  85    Oral surgery, wisdom teeth     C NONSPECIFIC PROCEDURE           C NONSPECIFIC PROCEDURE      Hysteroscopy, ablation of uterine fibroid     C NONSPECIFIC PROCEDURE      miscarriage      CATARACT IOL, RT/LT       COMBINED CYSTOSCOPY, RETROGRADES, URETEROSCOPY, LASER HOLMIUM LITHOTRIPSY URETER(S), INSERT STENT Right 11/30/2017    Procedure: COMBINED CYSTOSCOPY, RETROGRADES, URETEROSCOPY, LASER HOLMIUM LITHOTRIPSY URETER(S), INSERT STENT;  Cystoscopy, Right Ureteroscopy with Holmium Laser Lithotripsy, Right Stent Placement;  Surgeon: Helio Lauren MD;  [facility]     ENT SURGERY      tonsillectomy     EYE SURGERY  2011    trabeculectomy     GI SURGERY      dairy intolerence - resolved     HEAD & NECK SURGERY      trabeculectomy, cataract, yag     OPERATIVE HYSTEROSCOPY WITH MORCELLATOR  7/25/2014    Procedure: OPERATIVE HYSTEROSCOPY WITH MORCELLATOR;  Surgeon: Maureen Tavarez MD;  [facility]     SURGICAL HISTORY OF -   12/11    right eye trabeculectomy     TONSILLECTOMY  1987     ROS: 10 point review of systems negative except noted in HPI  PHYSICAL EXAM  General appearance- healthy, alert, and in no distress.  Skin- Skin color and turgor normal.  No obvious rashes.  Neck- Neck is supple without obvious adenopathy.  Lungs- Respiratory effort unlabored.  Gait- Normal.  Abdomen - soft non distended, non tender without obvious masses.        Impression:  Gallbladder: adenomatosis with calcifications. Previous studies have noted stones or sludge.  Recommendation:  Laparoscopic Cholecystectomy and a low to nonfat diet until the patient undergoes surgery.    A full discussion regarding the alternatives, risks, goals, and potential complications for this surgery was completed today.  The patient understood that the potential problems included but are not limited to:  Infection, bleeding, bile leak, injury to structures about the gallbladder, possible common duct stone requiring further procedures. Most current review of literature confirm the more common specific risks related to laparoscopic cholecystectomy include bile duct injury (3/1000), bile leak (10/1000), retained common bile  duct stone (10/1000), postcholecystectomy diarrhea (1-2%) and these complications may require additional treatment.    The patient verbally expressed understanding, was given the opportunity for questions, and gives full informed consent for the procedure.      Today the patient was instructed on the need for a preoperative H&P, NPO status prior to surgery, and the need to stop anticoagulants prior to surgery.  Additional educational material, soap, and instructions will be mailed out to the patients home.    The total time spent with this patient was 30 minutes.  Of this time, greater than 50% was spent counseling and coordinating care.      Lovenox:  No        Again, thank you for allowing me to participate in the care of your patient.      Sincerely,    Reggie Coffey MD

## 2018-07-11 NOTE — TELEPHONE ENCOUNTER
"Requested Prescriptions   Pending Prescriptions Disp Refills     omeprazole (PRILOSEC) 40 MG capsule [Pharmacy Med Name: OMEPRAZOLE CAPS 40MG]  Last Written Prescription Date:  4/9/2018  Last Fill Quantity: 90 capsule,  # refills: 0   Last Office Visit: No previous visit found   Future Office Visit:    Next 5 appointments (look out 90 days)     Jul 12, 2018  3:20 PM CDT   Pre-Op physical with DARRIN Meza CNP   [facility] ([facility])    [address] [place] [id number]   [phone]                  90 capsule 0     Sig: TAKE 1 CAPSULE DAILY 30 TO 60 MINUTES BEFORE A MEAL (NEED A PHYSICAL IN JULY 2018)    PPI Protocol Passed    7/10/2018  7:35 PM       Passed - Not on Clopidogrel (unless Pantoprazole ordered)       Passed - No diagnosis of osteoporosis on record       Passed - Recent (12 mo) or future (30 days) visit within the authorizing provider's specialty    Patient had office visit in the last 12 months or has a visit in the next 30 days with authorizing provider or within the authorizing provider's specialty.  See \"Patient Info\" tab in inbasket, or \"Choose Columns\" in Meds & Orders section of the refill encounter.           Passed - Patient is age 18 or older       Passed - No active pregnacy on record       Passed - No positive pregnancy test in past 12 months          "

## 2018-07-12 ENCOUNTER — OFFICE VISIT (OUTPATIENT)
Dept: FAMILY MEDICINE | Facility: CLINIC | Age: 53
End: 2018-07-12
Payer: COMMERCIAL

## 2018-07-12 VITALS
WEIGHT: 145 LBS | SYSTOLIC BLOOD PRESSURE: 108 MMHG | HEART RATE: 74 BPM | BODY MASS INDEX: 24.89 KG/M2 | OXYGEN SATURATION: 97 % | TEMPERATURE: 98.7 F | RESPIRATION RATE: 18 BRPM | DIASTOLIC BLOOD PRESSURE: 70 MMHG

## 2018-07-12 DIAGNOSIS — Z01.818 PREOP GENERAL PHYSICAL EXAM: Primary | ICD-10-CM

## 2018-07-12 DIAGNOSIS — K80.20 CALCULUS OF GALLBLADDER WITHOUT CHOLECYSTITIS WITHOUT OBSTRUCTION: ICD-10-CM

## 2018-07-12 PROCEDURE — 99214 OFFICE O/P EST MOD 30 MIN: CPT | Performed by: NURSE PRACTITIONER

## 2018-07-12 NOTE — PROGRESS NOTES
[address] [address]  [phone]  Dept: [phone]    PRE-OP EVALUATION:  Today's date: 2018    Iliana Mcfarlane (: 1965) presents for pre-operative evaluation assessment as requested by Dr. Mittal.  She requires evaluation and anesthesia risk assessment prior to undergoing surgery/procedure for treatment of laparoscopic cholecystectomy .    Primary Physician: Janey Aguirre  Type of Anesthesia Anticipated: to be determined    Patient has a Health Care Directive or Living Will:  NO    Preop Questions 2018   Who is doing your surgery? dr mittal   What are you having done? gallbladder removal   Date of Surgery/Procedure: 18   Facility or Hospital where procedure[facility]surgery [facility]   1.  Do you have a history of Heart attack, stroke, stent, coronary bypass surgery, or other heart surgery? No   2.  Do you ever have any pain or discomfort in your chest? No   3.  Do you have a history of  Heart Failure? No   4.   Are you troubled by shortness of breath when:  walking on a level surface, or up a slight hill, or at night? No   5.  Do you currently have a cold, bronchitis or other respiratory infection? No   6.  Do you have a cough, shortness of breath, or wheezing? No   7.  Do you sometimes get pains in the calves of your legs when you walk? No   8. Do you or anyone in your family have previous history of blood clots? No   9.  Do you or does anyone in your family have a serious bleeding problem such as prolonged bleeding following surgeries or cuts? No   10. Have you ever had problems with anemia or been told to take iron pills? YES - 30 years ago    11. Have you had any abnormal blood loss such as black, tarry or bloody stools, or abnormal vaginal bleeding? YES - after childbirth    12. Have you ever had a blood transfusion? No   13. Have you or any of your relatives ever had problems with anesthesia? No   14. Do you have  sleep apnea, excessive snoring or daytime drowsiness? No   15. Do you have any prosthetic heart valves? No   16. Do you have prosthetic joints? No   17. Is there any chance that you may be pregnant? No         HPI:     HPI related to upcoming procedure: pelvic pain on and off for 6+ months and found cholecystitis      See problem list for active medical problems.  Problems all longstanding and stable, except as noted/documented.  See ROS for pertinent symptoms related to these conditions.                                                                                                                                                          .    MEDICAL HISTORY:     Patient Active Problem List    Diagnosis Date Noted     Diverticulosis of large intestine without hemorrhage 2017     Priority: Medium     Calculus of gallbladder without cholecystitis without obstruction 2017     Priority: Medium     Noted on CT scan        Genital herpes 2012     Priority: Medium     IMO update changed this record. Please review for accuracy       Glaucoma, [place] 2011     Priority: Medium     [place] Hiwot        Anxiety 2010     Priority: Medium     MENOMETRORRHAGIA 2007     Priority: Medium      Past Medical History:   Diagnosis Date     Allergic rhinitis late      Allergic rhinitis, cause unspecified      Anxiety      Depression, major      Depressive disorder      Esophageal reflux      Genital herpes      Glaucoma      Hoarseness late      Lumbago      Menarche age 16     Menorrhagia      Nephrolithiasis      PONV (postoperative nausea and vomiting)      Reduced vision      Past Surgical History:   Procedure Laterality Date     C NONSPECIFIC PROCEDURE      Upper GI endoscopy     C NONSPECIFIC PROCEDURE      Tonsillectomy     C NONSPECIFIC PROCEDURE  85    Oral surgery, wisdom teeth     C NONSPECIFIC PROCEDURE           C NONSPECIFIC PROCEDURE      Hysteroscopy,  ablation of uterine fibroid     C NONSPECIFIC PROCEDURE      miscarriage     CATARACT IOL, RT/LT       COMBINED CYSTOSCOPY, RETROGRADES, URETEROSCOPY, LASER HOLMIUM LITHOTRIPSY URETER(S), INSERT STENT Right 11/30/2017    Procedure: COMBINED CYSTOSCOPY, RETROGRADES, URETEROSCOPY, LASER HOLMIUM LITHOTRIPSY URETER(S), INSERT STENT;  Cystoscopy, Right Ureteroscopy with Holmium Laser Lithotripsy, Right Stent Placement;  Surgeon: Helio Lauren MD;  Location: [facility]     ENT SURGERY      tonsillectomy     EYE SURGERY  2011    trabeculectomy     GI SURGERY      dairy intolerence - resolved     HEAD & NECK SURGERY      trabeculectomy, cataract, yag     OPERATIVE HYSTEROSCOPY WITH MORCELLATOR  7/25/2014    Procedure: OPERATIVE HYSTEROSCOPY WITH MORCELLATOR;  Surgeon: Maureen Tavarez MD;  Location: [facility] OR     SURGICAL HISTORY OF -   12/11    right eye trabeculectomy     TONSILLECTOMY  1987     Current Outpatient Prescriptions   Medication Sig Dispense Refill     Beclomethasone Dipropionate (QNASL) 80 MCG/ACT AERS Nasal Spray Spray 2 sprays into both nostrils daily 8.7 g 3     busPIRone (BUSPAR) 15 MG tablet TAKE 1 TABLET THREE TIMES A  tablet 2     CALCIUM-MAGNESIUM-ZINC PO Take 1 tablet by mouth every evening        clindamycin (CLEOCIN) 150 MG capsule Open one capsule and place in 1 Litre of Normal Saline and mix. Irrigate with 20 cc each nostril QID. (Patient taking differently: as needed Open one capsule and place in 1 Litre of Normal Saline and mix. Irrigate with 20 cc each nostril QID.) 10 capsule 3     escitalopram (LEXAPRO) 20 MG tablet Take 1 tablet (20 mg) by mouth daily (Patient taking differently: Take 20 mg by mouth every evening ) 90 tablet PRN     Lifitegrast (XIIDRA) 5 % SOLN Apply 1 drop to eye 2 times daily        loratadine (CLARITIN) 10 MG tablet Take 10 mg by mouth as needed Reported on 3/22/2017       Omega-3 Fatty Acids (OMEGA-3 FISH OIL PO) Take 4 capsules by mouth every evening         omeprazole (PRILOSEC) 40 MG capsule Take 1 capsule (40 mg) by mouth daily Take 30-60 minutes before a meal. 90 capsule 0     sodium chloride 0.9%, bottle, 0.9 % irrigation IRRIGATE 20 ML EACH NOSTRIL 4 TIMES DAILY FOR 2 MONTHS. 1000 mL 3     traZODone (DESYREL) 50 MG tablet Take 2 tablets (100 mg) by mouth nightly as needed for sleep 180 tablet PRN     zolpidem (AMBIEN CR) 6.25 MG CR tablet Take 1 tablet (6.25 mg) by mouth nightly as needed for sleep 90 tablet 1     OTC products: None, except as noted above, no recent use of OTC ASA, NSAIDS or Steroids and no use of herbal medications or other supplements    Allergies   Allergen Reactions     No Known Drug Allergies       Latex Allergy: NO    Social History   Substance Use Topics     Smoking status: Never Smoker     Smokeless tobacco: Never Used     Alcohol use 0.0 oz/week      Comment: minimal     History   Drug Use No       REVIEW OF SYSTEMS:   Constitutional, neuro, ENT, endocrine, pulmonary, cardiac, gastrointestinal, genitourinary, musculoskeletal, integument and psychiatric systems are negative, except as otherwise noted.    EXAM:   /70 (BP Location: Left arm, Patient Position: Sitting, Cuff Size: Adult Regular)  Pulse 74  Temp 98.7  F (37.1  C) (Oral)  Resp 18  Wt 145 lb (65.8 kg)  SpO2 97%  BMI 24.89 kg/m2    GENERAL APPEARANCE: healthy, alert and no distress     EYES: EOMI, PERRL     HENT: ear canals and TM's normal and nose and mouth without ulcers or lesions     NECK: no adenopathy, no asymmetry, masses, or scars and thyroid normal to palpation     RESP: lungs clear to auscultation - no rales, rhonchi or wheezes     CV: regular rates and rhythm, normal S1 S2, no S3 or S4 and no murmur, click or rub     ABDOMEN:  soft, nontender, no HSM or masses and bowel sounds normal     MS: extremities normal- no gross deformities noted, no evidence of inflammation in joints, FROM in all extremities.     SKIN: no suspicious lesions or rashes     PSYCH:  mentation appears normal. and affect normal/bright    DIAGNOSTICS:   No labs or EKG required for low risk surgery (cataract, skin procedure, breast biopsy, etc)    Recent Labs   Lab Test  02/22/18   0904  12/08/17   0932   07/10/14   0749   HGB  13.8  12.7   < >  11.6*   PLT  294  288   < >  286   INR   --    --    --   0.98   NA  138  140   < >  141   POTASSIUM  3.8  3.7   < >  4.1   CR  0.74  1.04   < >  0.74    < > = values in this interval not displayed.        IMPRESSION:   Reason for surgery/procedure: keny    Diagnosis/reason for consult: Preop exam     The proposed surgical procedure is considered INTERMEDIATE risk.    REVISED CARDIAC RISK INDEX  The patient has the following serious cardiovascular risks for perioperative complications such as (MI, PE, VFib and 3  AV Block):  No serious cardiac risks  INTERPRETATION: 0 risks: Class I (very low risk - 0.4% complication rate)    The patient has the following additional risks for perioperative complications:  No identified additional risks      ICD-10-CM    1. Preop general physical exam Z01.818    2. Calculus of gallbladder without cholecystitis without obstruction K80.20        RECOMMENDATIONS:       --Patient is to take all scheduled medications on the day of surgery EXCEPT for modifications listed below.    APPROVAL GIVEN to proceed with proposed procedure, without further diagnostic evaluation       Signed Electronically by: DARRIN Meza CNP    Copy of this evaluation report is provided to requesting physician.    [facility] Preop Guidelines    Revised Cardiac Risk Index

## 2018-07-12 NOTE — MR AVS SNAPSHOT
After Visit Summary   7/12/2018    Iliana Mcfarlane    MRN: [id number]           Patient Information     Date Of Birth          1965        Visit Information        Provider Department      7/12/2018 3:20 PM Lakshmi Aguilera APRN CNP [facility]        Today's Diagnoses     Preop general physical exam    -  1    Calculus of gallbladder without cholecystitis without obstruction          Care Instructions      Before Your Surgery      Call your surgeon if there is any change in your health. This includes signs of a cold or flu (such as a sore throat, runny nose, cough, rash or fever).    Do not smoke, drink alcohol or take over the counter medicine (unless your surgeon or primary care doctor tells you to) for the 24 hours before and after surgery.    If you take prescribed drugs: Follow your doctor s orders about which medicines to take and which to stop until after surgery.    Eating and drinking prior to surgery: follow the instructions from your surgeon    Take a shower or bath the night before surgery. Use the soap your surgeon gave you to gently clean your skin. If you do not have soap from your surgeon, use your regular soap. Do not shave or scrub the surgery site.  Wear clean pajamas and have clean sheets on your bed.           Follow-ups after your visit        Your next 10 appointments already scheduled     Jul 16, 2018   Procedure with Reggie Coffey MD   [facility] ([facility])    [address] [place], [place] [id number] [phone]           Located in the Clinics and Surgery Center at [address] [place] [address].   parking is very convenient and highly recommended.  is a $6 flat rate fee.  Both  and self parkers should enter the main arrival plaza from [facility]; parking attendants will direct you based on your parking preference.              Who to  contact     If you have questions or need follow up information about today's clinic visit or your schedule please contact [facility] directly at [phone].  Normal or non-critical lab and imaging results will be communicated to you by ArtSquarehart, letter or phone within 4 business days after the clinic has received the results. If you do not hear from us within 7 days, please contact the clinic through ArtSquarehart or phone. If you have a critical or abnormal lab result, we will notify you by phone as soon as possible.  Submit refill requests through TwentyFeet or call your pharmacy and they will forward the refill request to us. Please allow 3 business days for your refill to be completed.          Additional Information About Your Visit        TwentyFeet Information     TwentyFeet gives you secure access to your electronic health record. If you see a primary care provider, you can also send messages to your care team and make appointments. If you have questions, please call your primary care clinic.  If you do not have a primary care provider, please call [phone] and they will assist you.        Care EveryWhere ID     This is your Care EveryWhere ID. This could be used by other organizations to access your [place] medical records  [id number]        Your Vitals Were     Pulse Temperature Respirations Pulse Oximetry BMI (Body Mass Index)       74 98.7  F (37.1  C) (Oral) 18 97% 24.89 kg/m2        Blood Pressure from Last 3 Encounters:   07/12/18 108/70   07/10/18 116/78   02/22/18 117/85    Weight from Last 3 Encounters:   07/12/18 145 lb (65.8 kg)   07/10/18 148 lb (67.1 kg)   02/22/18 143 lb (64.9 kg)              Today, you had the following     No orders found for display         Today's Medication Changes          These changes are accurate as of 7/12/18  3:34 PM.  If you have any questions, ask your nurse or doctor.               These medicines have changed or have updated prescriptions.         Dose/Directions    clindamycin 150 MG capsule   Commonly known as:  CLEOCIN   This may have changed:    - when to take this  - reasons to take this  - additional instructions   Used for:  Chronic maxillary sinusitis, Chronic rhinitis, Nasal turbinate hypertrophy, Deviated nasal septum, Uncomplicated asthma, unspecified asthma severity        Open one capsule and place in 1 Litre of Normal Saline and mix. Irrigate with 20 cc each nostril QID.   Quantity:  10 capsule   Refills:  3       escitalopram 20 MG tablet   Commonly known as:  LEXAPRO   This may have changed:  when to take this   Used for:  Anxiety        Dose:  20 mg   Take 1 tablet (20 mg) by mouth daily   Quantity:  90 tablet   Refills:  PRN                Primary Care Provider Office Phone # Fax #    Janey LEE Aguirre [phone] [address] [facility] [address]        Equal Access to Services     ASTER GONZALEZ : Hadii aad ku hadasho Sobeka, waaxda luqadaha, qaybta kaalmada adeapoorvayacan, jovany fernández . So [facility] [phone].    ATENCIÓN: Si habla español, tiene a case disposición servicios gratuitos de asistencia lingüística. [facility] [phone].    We comply with applicable federal civil rights laws and [place] laws. We do not discriminate on the basis of race, color, national origin, age, disability, sex, sexual orientation, or gender identity.            Thank you!     Thank you for choosing [facility]  for your care. Our goal is always to provide you with excellent care. Hearing back from our patients is one way we can continue to improve our services. Please take a few minutes to complete the written survey that you may receive in the mail after your visit with us. Thank you!             Your Updated Medication List - Protect others around you: Learn how to safely use, store and throw away your medicines at www.disposemymeds.org.          This list is accurate as of 7/12/18   3:34 PM.  Always use your most recent med list.                   Brand Name Dispense Instructions for use Diagnosis    Beclomethasone Dipropionate 80 MCG/ACT Aers Nasal Spray    QNASL    8.7 g    Spray 2 sprays into both nostrils daily    Allergic rhinitis caused by mold       busPIRone 15 MG tablet    BUSPAR    270 tablet    TAKE 1 TABLET THREE TIMES A DAY    Anxiety       CALCIUM-MAGNESIUM-ZINC PO      Take 1 tablet by mouth every evening        clindamycin 150 MG capsule    CLEOCIN    10 capsule    Open one capsule and place in 1 Litre of Normal Saline and mix. Irrigate with 20 cc each nostril QID.    Chronic maxillary sinusitis, Chronic rhinitis, Nasal turbinate hypertrophy, Deviated nasal septum, Uncomplicated asthma, unspecified asthma severity       escitalopram 20 MG tablet    LEXAPRO    90 tablet    Take 1 tablet (20 mg) by mouth daily    Anxiety       loratadine 10 MG tablet    CLARITIN     Take 10 mg by mouth as needed Reported on 3/22/2017        OMEGA-3 FISH OIL PO      Take 4 capsules by mouth every evening        omeprazole 40 MG capsule    priLOSEC    90 capsule    Take 1 capsule (40 mg) by mouth daily Take 30-60 minutes before a meal.    Nausea, Lower abdominal pain, Calculus of gallbladder without cholecystitis without obstruction       sodium chloride 0.9% (bottle) 0.9 % irrigation     1000 mL    IRRIGATE 20 ML EACH NOSTRIL 4 TIMES DAILY FOR 2 MONTHS.    Chronic maxillary sinusitis       traZODone 50 MG tablet    DESYREL    180 tablet    Take 2 tablets (100 mg) by mouth nightly as needed for sleep    Primary insomnia       XIIDRA 5 % Soln opthalmic solution   Generic drug:  Lifitegrast      Apply 1 drop to eye 2 times daily        zolpidem 6.25 MG CR tablet    AMBIEN CR    90 tablet    Take 1 tablet (6.25 mg) by mouth nightly as needed for sleep    Primary insomnia

## 2018-07-13 ENCOUNTER — ANESTHESIA EVENT (OUTPATIENT)
Dept: SURGERY | Facility: AMBULATORY SURGERY CENTER | Age: 53
End: 2018-07-13

## 2018-07-13 RX ORDER — OMEPRAZOLE 40 MG/1
CAPSULE, DELAYED RELEASE ORAL
Qty: 90 CAPSULE | Refills: 0 | Status: SHIPPED | OUTPATIENT
Start: 2018-07-13 | End: 2018-11-28

## 2018-07-16 ENCOUNTER — HOSPITAL ENCOUNTER (OUTPATIENT)
Facility: AMBULATORY SURGERY CENTER | Age: 53
End: 2018-07-16
Attending: SURGERY
Payer: COMMERCIAL

## 2018-07-16 ENCOUNTER — ANESTHESIA (OUTPATIENT)
Dept: SURGERY | Facility: AMBULATORY SURGERY CENTER | Age: 53
End: 2018-07-16

## 2018-07-16 ENCOUNTER — SURGERY (OUTPATIENT)
Age: 53
End: 2018-07-16

## 2018-07-16 VITALS
HEIGHT: 64 IN | TEMPERATURE: 97.4 F | DIASTOLIC BLOOD PRESSURE: 103 MMHG | BODY MASS INDEX: 24.75 KG/M2 | RESPIRATION RATE: 14 BRPM | OXYGEN SATURATION: 99 % | WEIGHT: 145 LBS | SYSTOLIC BLOOD PRESSURE: 142 MMHG

## 2018-07-16 DIAGNOSIS — K80.50 BILIARY COLIC: Primary | ICD-10-CM

## 2018-07-16 RX ORDER — NALOXONE HYDROCHLORIDE 0.4 MG/ML
.1-.4 INJECTION, SOLUTION INTRAMUSCULAR; INTRAVENOUS; SUBCUTANEOUS
Status: DISCONTINUED | OUTPATIENT
Start: 2018-07-16 | End: 2018-07-16 | Stop reason: HOSPADM

## 2018-07-16 RX ORDER — DIMENHYDRINATE 50 MG/ML
25 INJECTION, SOLUTION INTRAMUSCULAR; INTRAVENOUS
Status: DISCONTINUED | OUTPATIENT
Start: 2018-07-16 | End: 2018-07-17 | Stop reason: HOSPADM

## 2018-07-16 RX ORDER — ONDANSETRON 4 MG/1
4 TABLET, ORALLY DISINTEGRATING ORAL EVERY 30 MIN PRN
Status: DISCONTINUED | OUTPATIENT
Start: 2018-07-16 | End: 2018-07-17 | Stop reason: HOSPADM

## 2018-07-16 RX ORDER — GLYCOPYRROLATE 0.2 MG/ML
INJECTION, SOLUTION INTRAMUSCULAR; INTRAVENOUS PRN
Status: DISCONTINUED | OUTPATIENT
Start: 2018-07-16 | End: 2018-07-16

## 2018-07-16 RX ORDER — FENTANYL CITRATE 50 UG/ML
INJECTION, SOLUTION INTRAMUSCULAR; INTRAVENOUS PRN
Status: DISCONTINUED | OUTPATIENT
Start: 2018-07-16 | End: 2018-07-16

## 2018-07-16 RX ORDER — AMOXICILLIN 250 MG
1-2 CAPSULE ORAL 2 TIMES DAILY
Qty: 30 TABLET | Refills: 0 | Status: SHIPPED | OUTPATIENT
Start: 2018-07-16 | End: 2018-11-28

## 2018-07-16 RX ORDER — HYDROCODONE BITARTRATE AND ACETAMINOPHEN 5; 325 MG/1; MG/1
1 TABLET ORAL
Status: DISCONTINUED | OUTPATIENT
Start: 2018-07-16 | End: 2018-07-17 | Stop reason: HOSPADM

## 2018-07-16 RX ORDER — LABETALOL HYDROCHLORIDE 5 MG/ML
10 INJECTION, SOLUTION INTRAVENOUS
Status: DISCONTINUED | OUTPATIENT
Start: 2018-07-16 | End: 2018-07-16 | Stop reason: HOSPADM

## 2018-07-16 RX ORDER — GABAPENTIN 300 MG/1
300 CAPSULE ORAL ONCE
Status: DISCONTINUED | OUTPATIENT
Start: 2018-07-16 | End: 2018-07-16 | Stop reason: HOSPADM

## 2018-07-16 RX ORDER — HYDROCODONE BITARTRATE AND ACETAMINOPHEN 5; 325 MG/1; MG/1
1-2 TABLET ORAL EVERY 4 HOURS PRN
Qty: 20 TABLET | Refills: 0 | Status: SHIPPED | OUTPATIENT
Start: 2018-07-16 | End: 2018-11-28

## 2018-07-16 RX ORDER — FENTANYL CITRATE 50 UG/ML
25-50 INJECTION, SOLUTION INTRAMUSCULAR; INTRAVENOUS
Status: DISCONTINUED | OUTPATIENT
Start: 2018-07-16 | End: 2018-07-16 | Stop reason: HOSPADM

## 2018-07-16 RX ORDER — GABAPENTIN 300 MG/1
300 CAPSULE ORAL ONCE
Status: COMPLETED | OUTPATIENT
Start: 2018-07-16 | End: 2018-07-16

## 2018-07-16 RX ORDER — PROPOFOL 10 MG/ML
INJECTION, EMULSION INTRAVENOUS PRN
Status: DISCONTINUED | OUTPATIENT
Start: 2018-07-16 | End: 2018-07-16

## 2018-07-16 RX ORDER — ONDANSETRON 2 MG/ML
4 INJECTION INTRAMUSCULAR; INTRAVENOUS EVERY 30 MIN PRN
Status: DISCONTINUED | OUTPATIENT
Start: 2018-07-16 | End: 2018-07-17 | Stop reason: HOSPADM

## 2018-07-16 RX ORDER — OXYCODONE HCL 5 MG/5 ML
5 SOLUTION, ORAL ORAL EVERY 4 HOURS PRN
Status: DISCONTINUED | OUTPATIENT
Start: 2018-07-16 | End: 2018-07-17 | Stop reason: HOSPADM

## 2018-07-16 RX ORDER — BUPIVACAINE HYDROCHLORIDE 2.5 MG/ML
INJECTION, SOLUTION INFILTRATION; PERINEURAL PRN
Status: DISCONTINUED | OUTPATIENT
Start: 2018-07-16 | End: 2018-07-16 | Stop reason: HOSPADM

## 2018-07-16 RX ORDER — SODIUM CHLORIDE, SODIUM LACTATE, POTASSIUM CHLORIDE, CALCIUM CHLORIDE 600; 310; 30; 20 MG/100ML; MG/100ML; MG/100ML; MG/100ML
INJECTION, SOLUTION INTRAVENOUS CONTINUOUS PRN
Status: DISCONTINUED | OUTPATIENT
Start: 2018-07-16 | End: 2018-07-16

## 2018-07-16 RX ORDER — FLUMAZENIL 0.1 MG/ML
0.2 INJECTION, SOLUTION INTRAVENOUS
Status: DISCONTINUED | OUTPATIENT
Start: 2018-07-16 | End: 2018-07-16 | Stop reason: HOSPADM

## 2018-07-16 RX ORDER — SODIUM CHLORIDE, SODIUM LACTATE, POTASSIUM CHLORIDE, CALCIUM CHLORIDE 600; 310; 30; 20 MG/100ML; MG/100ML; MG/100ML; MG/100ML
INJECTION, SOLUTION INTRAVENOUS CONTINUOUS
Status: DISCONTINUED | OUTPATIENT
Start: 2018-07-16 | End: 2018-07-17 | Stop reason: HOSPADM

## 2018-07-16 RX ORDER — ACETAMINOPHEN 325 MG/1
975 TABLET ORAL ONCE
Status: COMPLETED | OUTPATIENT
Start: 2018-07-16 | End: 2018-07-16

## 2018-07-16 RX ORDER — KETOROLAC TROMETHAMINE 30 MG/ML
INJECTION, SOLUTION INTRAMUSCULAR; INTRAVENOUS PRN
Status: DISCONTINUED | OUTPATIENT
Start: 2018-07-16 | End: 2018-07-16

## 2018-07-16 RX ORDER — PROPOFOL 10 MG/ML
INJECTION, EMULSION INTRAVENOUS CONTINUOUS PRN
Status: DISCONTINUED | OUTPATIENT
Start: 2018-07-16 | End: 2018-07-16

## 2018-07-16 RX ORDER — DEXAMETHASONE SODIUM PHOSPHATE 4 MG/ML
4 INJECTION, SOLUTION INTRA-ARTICULAR; INTRALESIONAL; INTRAMUSCULAR; INTRAVENOUS; SOFT TISSUE EVERY 10 MIN PRN
Status: DISCONTINUED | OUTPATIENT
Start: 2018-07-16 | End: 2018-07-17 | Stop reason: HOSPADM

## 2018-07-16 RX ORDER — ONDANSETRON 2 MG/ML
INJECTION INTRAMUSCULAR; INTRAVENOUS PRN
Status: DISCONTINUED | OUTPATIENT
Start: 2018-07-16 | End: 2018-07-16

## 2018-07-16 RX ORDER — NALOXONE HYDROCHLORIDE 0.4 MG/ML
.1-.4 INJECTION, SOLUTION INTRAMUSCULAR; INTRAVENOUS; SUBCUTANEOUS
Status: DISCONTINUED | OUTPATIENT
Start: 2018-07-16 | End: 2018-07-17 | Stop reason: HOSPADM

## 2018-07-16 RX ORDER — HYDROMORPHONE HYDROCHLORIDE 1 MG/ML
.3-.5 INJECTION, SOLUTION INTRAMUSCULAR; INTRAVENOUS; SUBCUTANEOUS EVERY 10 MIN PRN
Status: DISCONTINUED | OUTPATIENT
Start: 2018-07-16 | End: 2018-07-17 | Stop reason: HOSPADM

## 2018-07-16 RX ORDER — DEXAMETHASONE SODIUM PHOSPHATE 10 MG/ML
INJECTION, SOLUTION INTRAMUSCULAR; INTRAVENOUS PRN
Status: DISCONTINUED | OUTPATIENT
Start: 2018-07-16 | End: 2018-07-16

## 2018-07-16 RX ORDER — MAGNESIUM HYDROXIDE 1200 MG/15ML
LIQUID ORAL PRN
Status: DISCONTINUED | OUTPATIENT
Start: 2018-07-16 | End: 2018-07-16 | Stop reason: HOSPADM

## 2018-07-16 RX ORDER — MEPERIDINE HYDROCHLORIDE 25 MG/ML
12.5 INJECTION INTRAMUSCULAR; INTRAVENOUS; SUBCUTANEOUS
Status: DISCONTINUED | OUTPATIENT
Start: 2018-07-16 | End: 2018-07-17 | Stop reason: HOSPADM

## 2018-07-16 RX ORDER — DIAZEPAM 5 MG
5 TABLET ORAL EVERY 8 HOURS PRN
Qty: 7 TABLET | Refills: 0 | Status: SHIPPED | OUTPATIENT
Start: 2018-07-16 | End: 2018-11-28

## 2018-07-16 RX ORDER — ACETAMINOPHEN 325 MG/1
975 TABLET ORAL ONCE
Status: DISCONTINUED | OUTPATIENT
Start: 2018-07-16 | End: 2018-07-16 | Stop reason: HOSPADM

## 2018-07-16 RX ADMIN — Medication 30 MG: at 09:06

## 2018-07-16 RX ADMIN — SODIUM CHLORIDE, SODIUM LACTATE, POTASSIUM CHLORIDE, CALCIUM CHLORIDE: 600; 310; 30; 20 INJECTION, SOLUTION INTRAVENOUS at 09:00

## 2018-07-16 RX ADMIN — PROPOFOL 200 MG: 10 INJECTION, EMULSION INTRAVENOUS at 09:06

## 2018-07-16 RX ADMIN — BUPIVACAINE HYDROCHLORIDE 20 ML: 2.5 INJECTION, SOLUTION INFILTRATION; PERINEURAL at 09:30

## 2018-07-16 RX ADMIN — ONDANSETRON 4 MG: 2 INJECTION INTRAMUSCULAR; INTRAVENOUS at 09:34

## 2018-07-16 RX ADMIN — FENTANYL CITRATE 50 MCG: 50 INJECTION, SOLUTION INTRAMUSCULAR; INTRAVENOUS at 09:10

## 2018-07-16 RX ADMIN — ACETAMINOPHEN 975 MG: 325 TABLET ORAL at 07:37

## 2018-07-16 RX ADMIN — FENTANYL CITRATE 50 MCG: 50 INJECTION, SOLUTION INTRAMUSCULAR; INTRAVENOUS at 08:06

## 2018-07-16 RX ADMIN — GLYCOPYRROLATE 0.2 MG: 0.2 INJECTION, SOLUTION INTRAMUSCULAR; INTRAVENOUS at 09:02

## 2018-07-16 RX ADMIN — KETOROLAC TROMETHAMINE 30 MG: 30 INJECTION, SOLUTION INTRAMUSCULAR; INTRAVENOUS at 09:40

## 2018-07-16 RX ADMIN — DEXAMETHASONE SODIUM PHOSPHATE 4 MG: 10 INJECTION, SOLUTION INTRAMUSCULAR; INTRAVENOUS at 09:15

## 2018-07-16 RX ADMIN — FENTANYL CITRATE 50 MCG: 50 INJECTION, SOLUTION INTRAMUSCULAR; INTRAVENOUS at 09:20

## 2018-07-16 RX ADMIN — GABAPENTIN 300 MG: 300 CAPSULE ORAL at 07:38

## 2018-07-16 RX ADMIN — MAGNESIUM HYDROXIDE 500 ML: 1200 LIQUID ORAL at 09:40

## 2018-07-16 RX ADMIN — PROPOFOL 200 MCG/KG/MIN: 10 INJECTION, EMULSION INTRAVENOUS at 09:06

## 2018-07-16 ASSESSMENT — LIFESTYLE VARIABLES: TOBACCO_USE: 0

## 2018-07-16 NOTE — ANESTHESIA PREPROCEDURE EVALUATION
Anesthesia Evaluation     . Pt has had prior anesthetic. Type: General and MAC    History of anesthetic complications   - PONV        ROS/MED HX    ENT/Pulmonary:     (+)MEKA risk factors daytime somnolence, allergic rhinitis, , . .   (-) tobacco use and asthma   Neurologic:  - neg neurologic ROS   (+)delerium     Cardiovascular:  - neg cardiovascular ROS   (+) ----. : . . . :. . Previous cardiac testing date:results:date: results:ECG reviewed date:1/9/17 results:SR date: results:          METS/Exercise Tolerance:  >4 METS   Hematologic:  - neg hematologic  ROS       Musculoskeletal:  - neg musculoskeletal ROS       GI/Hepatic: Comment: Occasional GERD, no meds, no symptoms recently. Recent nausea.    (+) GERD       Renal/Genitourinary:  - ROS Renal section negative   (+) Nephrolithiasis ,       Endo:  - neg endo ROS       Psychiatric:     (+) psychiatric history anxiety      Infectious Disease:  - neg infectious disease ROS       Malignancy:      - no malignancy   Other:    (+) C-spine cleared: N/A, H/O Chronic Pain,no other significant disability                    Physical Exam  Normal systems: dental    Airway   Mallampati: II  TM distance: >3 FB  Neck ROM: full    Dental     Cardiovascular   Rhythm and rate: regular and normal      Pulmonary    breath sounds clear to auscultation    Other findings: For further details of assessment, testing, and physical exam please see H and P completed on same date.             PAC Discussion and Assessment    ASA Classification: 2  Case is suitable for: [facility]  Anesthetic techniques and relevant risks discussed: GA  Invasive monitoring and risk discussed: No  Types:   Possibility and Risk of blood transfusion discussed: No  NPO instructions given:   Additional anesthetic preparation and risks discussed:   Needs early admission to pre-op area:   Other:     PAC Resident/NP Anesthesia Assessment:  Iliana Mcfarlane is a 52 year old female scheduled to undergo cystoscopy,  right ureteroscopy, laser lithotripsy, stent placement with Dr. Lauren on 11/30/17. She has the following specific operative considerations:   - RCRI : No serious cardiac risks.    - VTE risk: 0.26%  - MEKA # of risks 1/8 = Low risk  - Risk of PONV score = 3.  If > 2, anti-emetic intervention recommended. If 3 or > anti emetic intervention recommended with two or more meds    Last procedures for cataracts and glaucoma OSH. Reports use of Ketamine and was very ill.      --Right ureteral stones. Above procedure now planned. Oxycodone used sparingly for flank pain. Cr 1.05.   --PONV. Significant history and ongoing nausea. Final decisions regarding prophylaxis by Anesthesia on DOS.   --Generally healthy female with no significant cardiopulmonary history. Nonsmoker. Good activity tolerance. EKG above.   --Anxiety. Lexapro at HS. Buspar prn.       Patient was discussed with Dr Mora.      Reviewed and Signed by PAC Mid-Level Provider/Resident  Mid-Level Provider/Resident: DARRIN Alicea, CNS  Date: 11/281/7  Time: 3:59pm    Attending Anesthesiologist Anesthesia Assessment:  52 year old for cysto, ureteroscopy for ureteral stone. Chart reviewed, patient seen and evaluated; agree with above assessment. Patient has no significant cardiac or pulmonary disease. Apparently patient had minor eye procedure, was given oral ketamine, hated it mostly due to headache and PONV. I have promised her essentially TIVA as she is quite upset over the last event and the significant PONV.     Patient is appropriate for the planned procedure without further workup or medical management change. The final anesthesia plan will be determined by the physician anesthesiologist caring for the patient on the day of surgery.    Reviewed and Signed by PAC Anesthesiologist  Anesthesiologist: elva  Date: 11/28/2017  Time:   Pass/Fail: Pass  Disposition:     PAC Pharmacist Assessment:        Pharmacist:   Date:   Time:      Anesthesia  Plan      History & Physical Review  History and physical reviewed and following examination; no interval change.    ASA Status:  2 .        Plan for General and ETT with Intravenous and Propofol induction. Maintenance will be Balanced.    PONV prophylaxis:  Ondansetron (or other 5HT-3) and Dexamethasone or Solumedrol  Additional equipment: Videolaryngoscope I have examined the patient and reviewed the medical record  Plan GA with ETT, TAP block, routine monitors    Alen Maurer MD      Postoperative Care  Postoperative pain management:  IV analgesics and Oral pain medications.      Consents  Anesthetic plan, risks, benefits and alternatives discussed with:  Patient.  Use of blood products discussed: Yes.   .                          .

## 2018-07-16 NOTE — IP AVS SNAPSHOT
[facility]    [address] [place] [address]    Phone:  [phone]    Fax:  [phone]                                       After Visit Summary   7/16/2018    Iliana Mcfarlane    MRN: [id number]           After Visit Summary Signature Page     I have received my discharge instructions, and my questions have been answered. I have discussed any challenges I see with this plan with the nurse or doctor.    ..........................................................................................................................................  Patient/Patient Representative Signature      ..........................................................................................................................................  Patient Representative Print Name and Relationship to Patient    ..................................................               ................................................  Date                                            Time    ..........................................................................................................................................  Reviewed by Signature/Title    ...................................................              ..............................................  Date                                                            Time

## 2018-07-16 NOTE — ANESTHESIA POSTPROCEDURE EVALUATION
Patient: Iliana Mcfarlane    Procedure(s):  Laparoscopic Cholecystectomy and open umbilical hernia repair - Wound Class: II-Clean Contaminated    Diagnosis:Biliary Colic  Diagnosis Additional Information: No value filed.    Anesthesia Type:  General, ETT    Note:  Anesthesia Post Evaluation    Patient location during evaluation: PACU  Patient participation: Able to fully participate in evaluation  Level of consciousness: awake and alert  Pain management: adequate  Airway patency: patent  Cardiovascular status: acceptable  Respiratory status: acceptable  Hydration status: acceptable  PONV: none             Last vitals:  Vitals:    07/16/18 0805 07/16/18 0810 07/16/18 1000   BP: (!) 128/96 115/79 (!) 136/91   Resp: 10 11 12   Temp:   36.3  C (97.3  F)   SpO2: 100% 95% 97%         Electronically Signed By: Alen Maurre MD  July 16, 2018  10:12 AM

## 2018-07-16 NOTE — BRIEF OP NOTE
[facility] Brief Operative Note    Pre-operative diagnosis: Biliary Colic   Post-operative diagnosis Same as Pre-op Dx   Procedure: Procedure(s):  Laparoscopic Cholecystectomy and open umbilical hernia repair - Wound Class: II-Clean Contaminated   Surgeon: Reggie Coffey MD   Assistants(s):    Estimated blood loss: Minimal    Specimens: gb   Findings: Small umb hernia, nl anatomy

## 2018-07-16 NOTE — ANESTHESIA PROCEDURE NOTES
Peripheral Nerve Block Procedure Note    Staff:     Anesthesiologist:  REYNA BOWLES  Location: Pre-op  Procedure Start/Stop TImes:      7/16/2018 8:10 AM     7/16/2018 8:21 AM    patient identified, IV checked, site marked, risks and benefits discussed, informed consent, monitors and equipment checked, pre-op evaluation, at physician/surgeon's request and post-op pain management      Correct Patient: Yes      Correct Position: Yes      Correct Site: Yes      Correct Procedure: Yes      Correct Laterality:  Yes    Site Marked:  Yes  Procedure details:     Procedure:  TAP    ASA:  1    Diagnosis:  Cholelithiasis    Laterality:  Bilateral    Position:  Supine    Sterile Prep: chloraprep, mask and sterile gloves      Local skin infiltration:  None    Insertion Site:  T11-12    Needle:  Short bevel    Needle length (mm):  120    Catheter gauge:  20    Ultrasound: Yes      Ultrasound used to identify targeted nerve, plexus, or vascular structure and placed a needle adjacent to it      Permanent Image entered into patiient's record      Abnormal pain on injection: No      Blood Aspirated: No      Paresthesias:  No    Bleeding at site: No      Bolus via:  Needle    Infusion Method:  Single Shot    Blood aspirated via catheter: No    Assessment/Narrative:     Injection made incrementally with aspirations every (mL):  5     TAP block performed per surgeons request for post op pain.  Procedure, risks, and indications explained.  All questions answered and patient agrees to proceed.  Monitored with EKG, SpO2, NIBP.  O2 via NC.  Sedation with 1 mg versed and 50 ucgm IV.  Patient remained meaningfully conversant throughout.  Sterilized abdomen with multiple chrlorapreps.  EZ visualization of TAP plane bilaterally with US.  Single pass of Sprottte needle with good visualization of needle on left.  Incremental injection of 10 cc 0.25% with good dissection of plane seen.  10 cc of Exparel diluted to 20 cc injected with  further dissection noted.  Patient tolerated well.  Moved to right side.  Good visualization of needle and muscle planes.  Single pass of needle with good visualization.  Good dissection between planes noted.  10 cc 0.25% bupivicane injected incrementally.  10 cc Exparel in 20 cc volume then injected with further dissection of planes.  Patient tolerated well.  Photos of both sides taken.  No hemodynamic changes    Alen Maurer MD

## 2018-07-16 NOTE — OP NOTE
Procedure Date: 07/16/2018      PREOPERATIVE DIAGNOSIS:  Biliary colic.      POSTOPERATIVE DIAGNOSIS:  Biliary colic, umbilical hernia.      OPERATIVE PROCEDURES:   1.  Laparoscopic cholecystectomy.   2.  Primary repair umbilical hernia.      SURGEON:  Reggie Coffey MD      ANESTHESIA:  General endotracheal.      INDICATIONS FOR PROCEDURE:  The patient presents with continued biliary colic.  Please refer to my progress report.  Informed consent was obtained.      OPERATIVE FINDINGS:  Normal cystic duct, cystic artery anatomy.  A small umbilical hernia.  Refer to below.      DESCRIPTION OF PROCEDURE:  The patient was brought to the operating room, put under general anesthesia, abdomen widely prepped and draped in the usual sterile fashion.  Infraumbilical incision was made and dissection carried down to an obvious umbilical hernia with approximately a 1 x 2 cm defect.  This was circumferentially cleared, the overlying umbilical skin was taken off, thus allowing for placement of stay stitches out laterally and isolating the defect to allow for placement of a 12-port.  Port was placed, abdomen insufflated, 5-port then placed per my routine technique at the subxiphoid and right lateral.  Easily able to pull the gallbladder out, take adhesions down, identify the cystic duct and cystic artery, which were doubly clipped with 5-mm clip applier and cut.  Gallbladder taken off the liver bed using electrocautery.  Good hemostasis noted.  Gallbladder delivered without difficulty.  Twenty mL of 0.25% Marcaine placed in the liver bed for postop pain control.  The abdomen was deflated.  Ports removed.  The fascial defect at the umbilical hernia was then closed with a running 0 Vicryl stitch.  It showed minimal amount of tension.  Overlying umbilical skin was tacked down with the same Vicryl, skin closed subcuticular, Steri-Strips applied.  Estimated blood loss was minimal.  The patient tolerated the procedure well and was taken  to the recovery room where she was without difficulty or apparent complication.         KENTON KEARNS MD             D: 2018   T: 2018   MT: [email or web address]      Name:     ED RAMOS   MRN:      [id number]        Account:        [id number]   :      1965           Procedure Date: 2018      Document: [id number]       cc: [facility] Surgery Billing

## 2018-07-16 NOTE — IP AVS SNAPSHOT
MRN:[id number]                      After Visit Summary   7/16/2018    Iliana Mcfarlane    MRN: [id number]           Thank you!     Thank you for choosing [facility] for your care. Our goal is always to provide you with excellent care. Hearing back from our patients is one way we can continue to improve our services. Please take a few minutes to complete the written survey that you may receive in the mail after you visit with us. Thank you!        Patient Information     Date Of Birth          1965        About your hospital stay     You were admitted on:  July 16, 2018 You last received care in the:  [facility]    You were discharged on:  July 16, 2018       Who to Call     For medical emergencies, please call 911.  For non-urgent questions about your medical care, please call your primary care provider or clinic, [phone]  For questions related to your surgery, please call your surgery clinic        Attending Provider     Provider Specialty    Reggie Coffey MD Surgery       Primary Care Provider Office Phone # Fax #    Janey Aguirre [phone]      After Care Instructions     Discharge Instructions       Follow up appointment as instructed by Surgeon and or RN, expect a call within 2-4 business days for post op follow up plan            No lifting       No lifting over 20 lbs and no strenuous physical activity for 3 weeks            Shower       No shower for 24 hours post procedure. May shower Postoperative Day (POD)  1                  Further instructions from your care team       [facility]  Home Care Following Anesthesia  For 24 hours after surgery:  1. Get plenty of rest.  A responsible adult must stay with you for at least 24 hours after you leave the surgery center.  2. Do not drive or use heavy equipment.  If you have weakness or tingling, don't drive or use heavy equipment until  this feeling goes away.   3. Do not drink alcohol.   4. Avoid strenuous or risky activities.  Ask for help when climbing stairs.  5. You may feel lightheaded.  IF so, sit for a few minutes before standing.  Have someone help you get up.   6. If you have nausea (feel sick to your stomach): Drink only clear liquids such as apple juice, ginger ale, broth or 7-Up.  Rest may also help.  Be sure to drink enough fluids.  Move to a regular diet as you feel able.   7. You may have a slight fever.  Call the doctor if your fever is over 100 F (37.7 C) (taken under the tongue) or lasts longer than 24 hours.  8. You may have a dry mouth, a sore throat, muscle aches or trouble sleeping. These should go away after 24 hours.  9. Do not make important or legal decisions.   Tips for taking pain medications  To get the best pain relief possible, remember these points:    Take pain medications as directed, before pain becomes severe.    Pain medication can upset your stomach: taking it with food may help.    Constipation is a common side effect of pain medication. Drink plenty of  fluids.    Eat foods high in fiber. Take a stool softener if recommended by your doctor or pharmacist.    Do not drink alcohol, drive or operate machinery while taking pain medications.    Ask about other ways to control pain, such as with heat, ice or relaxation.    Tylenol/Acetaminophen Consumption  To help encourage the safe use of acetaminophen, the makers of TYLENOL  have lowered the maximum daily dose for single-ingredient Extra Strength TYLENOL  (acetaminophen) products sold in the [place] from 8 pills per day (4,000 mg) to 6 pills per day (3,000 mg). The dosing interval has also changed from 2 pills every 4-6 hours to 2 pills every 6 hours.    If you feel your pain relief is insufficient, you may take Tylenol/Acetaminophen in addition to your narcotic pain medication.     Be careful not to exceed 3,000 mg of Tylenol/Acetaminophen in a 24 hour period from  all sources.    If you are taking extra strength Tylenol/acetaminophen (500 mg), the maximum dose is 6 tablets in 24 hours.    If you are taking regular strength acetaminophen (325 mg), the maximum dose is 9 tablets in 24 hours.    Call a doctor for any of the followin. Signs of infection (fever, growing tenderness at the surgery site, a large amount of drainage or bleeding, severe pain, foul-smelling drainage, redness, swelling).  2. It has been over 8 to 10 hours since surgery and you are still not able to urinate (pass water).  3. Headache for over 24 hours.  Your doctor is:  Dr. Reggie Coffey, General Surgery: [phone]         Or dial [phone] and ask for the resident on call for:  General Surgery  For emergency care, call the:  [facility]:  [phone] (TTY for hearing impaired: [phone])    After your Laparoscopic Cholecystectomy          Incision care     You may take a shower the day after surgery. Carefully wash your incision with soap and water. Do not submerge yourself in water (bath, whirlpool, hot tub, pool, lake) for 14 days after surgery.     Remove the bandage the day after surgery, but leave the medical tape (Steri-Strips) or glue in place. These will loosen and fall off on their own 5 to 7 days after surgery.       Always wash your hands before touching your incisions or removing bandages.      Other medicines     Wait to start aspirin or blood thinners until the day after surgery. You can continue your regular medicines at your normal time the day after surgery.      Your pain medicine may cause constipation (hard, dry stools). To help with this, take the stool softener your doctor gave you or an over-the-counter stool softener or laxative. You can stop taking this when you are no longer taking pain medicine and your bowel movements are back to normal.      For pain or discomfort     Take the narcotic pain medicine your doctor gave you as needed and as  instructed on the bottle. If you prefer to use over-the-counter medication, use acetaminophen (Tylenol) or ibuprofen (Advil, Motrin) as instructed on the box. Do not take Tylenol if it is in your narcotic pain medication.     Use an ice pack on your abdomen (belly) for 20 minutes at a time as needed for the first 24 hours. Be sure to protect your skin by putting a cloth between the ice pack and your skin.     After 24 hours you can switch to heat for 20 minutes as needed. Be sure to protect your skin by putting a cloth between the heat pack and your skin.       Activities     No driving until you feel it s safe to do so. Don t drive while taking narcotic pain medicine.     Don t lift anything heavier than 20 pounds for 3 to 4 weeks after surgery.      Special equipment     If you left the hospital in PARI socks (compression stockings), you may take them off the day after surgery.      Diet     You can eat your regular meals after surgery.      When to call the doctor   Call your doctor if you have:     A fever above 101 F (38.3 C) (taken under the tongue), or a fever or chills lasting more than a day.     Redness at the incision site.     Any fluid or blood draining from the incision, especially if it smells bad.      Severe pain that doesn t improve with pain medicine.          We will call you 2 to 4 days after surgery to review this handout, answer questions and help arrange after-surgery care. If you have questions or concerns, please call [phone] during regular office hours. If you need to call after business hours, call [phone] and ask to page the surgeon on-call.         Transversus Abdominis Plane (TAP) Pain Block      What is a TAP block?   A TAP block can help you manage your pain after surgery. TAP stands for transversus abdominis plane, which is a muscle layer in your abdomen (belly). The TAP block uses numbing medicine similar to Novocaine to block pain near the site of your surgery.       Why  get a TAP block?     To better manage your pain after surgery. A tap block will help keep the pain from getting severe and out of control.     To block pain signals from the nerve, which helps decrease pain after surgery.     To help you sleep, easily breathe deeply, walk and visit with others.      How is it done?   You will lie still on a table. We will use an ultrasound machine to help us see the correct muscle layer of your abdomen. Then, we ll use a needle to inject the medicine. We may also give you some sleep medicine to lessen the pain of the injection.       The procedure takes between 5 and 15 minutes. It is usually done right before surgery, but will sometimes be after. It depends on your surgery and care needs.      What can I expect?     You may feel numbness, tingling or a heaviness in your abdomen.      You may have pain control up to 72 hours after surgery.     The TAP block may not lessen all of your surgery pain. But most patients feel 50 to 75 percent less pain than without the block.       Tell your nurse if you have:     Numbness or tingling in areas other than where the injection was     Blurry vision     Ringing in your ears     A metallic taste in your mouth            Additional Information     If you use hormonal birth control (such as the pill, patch, ring or implants): You'll need a second form of birth control for 7 days (condoms, a diaphragm or contraceptive foam). While in the hospital, you received a medicine called Bridion. Your normal birth control will not work as well for a week after taking this medicine.          Pending Results     Date and Time Order Name Status Description    7/16/2018 0936 Surgical pathology exam In process             Admission Information     Date & Time Provider Department Dept. Phone    7/16/2018 Reggie Coffey MD [facility] [phone]      Your Vitals Were     Blood Pressure Temperature Respirations Height Weight  "Pulse Oximetry    136/91 97.3  F (36.3  C) (Temporal) 12 1.626 m (5' 4\") 65.8 kg (145 lb) 97%    BMI (Body Mass Index)                   24.89 kg/m2           [employer] Information     [employer] gives you secure access to your electronic health record. If you see a primary care provider, you can also send messages to your care team and make appointments. If you have questions, please call your primary care clinic.  If you do not have a primary care provider, please call [phone] and they will assist you.      [employer] is an electronic gateway that provides easy, online access to your medical records. With [employer], you can request a clinic appointment, read your test results, renew a prescription or communicate with your care team.     To access your existing account, please contact your [facility] or call [phone] for assistance.        Care EveryWhere ID     This is your Care EveryWhere ID. This could be used by other organizations to access your [facility] medical records  [id number]        Equal Access to Services     ASTER GONZALEZ : Hadii john hines Sobeka, waaxda luqadaha, qaybta kaalmacan lucas, jovany fernández . So [facility] [phone].    ATENCIÓN: Si habla español, tiene a case disposición servicios gratuitos de asistencia lingüística. Llame al [phone].    We comply with applicable federal civil rights laws and [place] laws. We do not discriminate on the basis of race, color, national origin, age, disability, sex, sexual orientation, or gender identity.               Review of your medicines      START taking        Dose / Directions    diazepam 5 MG tablet   Commonly known as:  VALIUM   Used for:  Biliary colic        Dose:  5 mg   Take 1 tablet (5 mg) by mouth every 8 hours as needed for muscle spasms   Quantity:  7 tablet   Refills:  0       HYDROcodone-acetaminophen 5-325 MG per tablet   Commonly known as:  NORCO   Used for:  Biliary " colic        Dose:  1-2 tablet   Take 1-2 tablets by mouth every 4 hours as needed for other (Moderate to Severe Pain)   Quantity:  20 tablet   Refills:  0       senna-docusate 8.6-50 MG per tablet   Commonly known as:  SENOKOT-S;PERICOLACE   Used for:  Biliary colic        Dose:  1-2 tablet   Take 1-2 tablets by mouth 2 times daily Take while on oral narcotics to prevent or treat constipation.   Quantity:  30 tablet   Refills:  0         CONTINUE these medicines which may have CHANGED, or have new prescriptions. If we are uncertain of the size of tablets/capsules you have at home, strength may be listed as something that might have changed.        Dose / Directions    clindamycin 150 MG capsule   Commonly known as:  CLEOCIN   This may have changed:    - when to take this  - reasons to take this  - additional instructions   Used for:  Chronic maxillary sinusitis, Chronic rhinitis, Nasal turbinate hypertrophy, Deviated nasal septum, Uncomplicated asthma, unspecified asthma severity        Open one capsule and place in 1 Litre of Normal Saline and mix. Irrigate with 20 cc each nostril QID.   Quantity:  10 capsule   Refills:  3       escitalopram 20 MG tablet   Commonly known as:  LEXAPRO   This may have changed:  when to take this   Used for:  Anxiety        Dose:  20 mg   Take 1 tablet (20 mg) by mouth daily   Quantity:  90 tablet   Refills:  PRN         CONTINUE these medicines which have NOT CHANGED        Dose / Directions    Beclomethasone Dipropionate 80 MCG/ACT Aers Nasal Spray   Commonly known as:  QNASL   Used for:  Allergic rhinitis caused by mold        Dose:  2 spray   Spray 2 sprays into both nostrils daily   Quantity:  8.7 g   Refills:  3       busPIRone 15 MG tablet   Commonly known as:  BUSPAR   Used for:  Anxiety        TAKE 1 TABLET THREE TIMES A DAY   Quantity:  270 tablet   Refills:  2       CALCIUM-MAGNESIUM-ZINC PO        Dose:  1 tablet   Take 1 tablet by mouth every evening   Refills:  0        loratadine 10 MG tablet   Commonly known as:  CLARITIN        Dose:  10 mg   Take 10 mg by mouth as needed Reported on 3/22/2017   Refills:  0       OMEGA-3 FISH OIL PO        Dose:  4 capsule   Take 4 capsules by mouth every evening   Refills:  0       omeprazole 40 MG capsule   Commonly known as:  priLOSEC   Used for:  Nausea, Lower abdominal pain, Calculus of gallbladder without cholecystitis without obstruction        TAKE 1 CAPSULE DAILY 30 TO 60 MINUTES BEFORE A MEAL (NEED A PHYSICAL IN JULY 2018)   Quantity:  90 capsule   Refills:  0       sodium chloride 0.9% (bottle) 0.9 % irrigation   Used for:  Chronic maxillary sinusitis        IRRIGATE 20 ML EACH NOSTRIL 4 TIMES DAILY FOR 2 MONTHS.   Quantity:  1000 mL   Refills:  3       traZODone 50 MG tablet   Commonly known as:  DESYREL   Used for:  Primary insomnia        Dose:  100 mg   Take 2 tablets (100 mg) by mouth nightly as needed for sleep   Quantity:  180 tablet   Refills:  PRN       XIIDRA 5 % Soln opthalmic solution   Generic drug:  Lifitegrast        Dose:  1 drop   Apply 1 drop to eye 2 times daily   Refills:  0       zolpidem 6.25 MG CR tablet   Commonly known as:  AMBIEN CR   Used for:  Primary insomnia        Dose:  6.25 mg   Take 1 tablet (6.25 mg) by mouth nightly as needed for sleep   Quantity:  90 tablet   Refills:  1            Where to get your medicines      These medications were sent to [facility] [id number] [address]    Hours:  TRANSPLANT PHONE NUMBER [phone] Phone:  [phone]     senna-docusate 8.6-50 MG per tablet         Some of these will need a paper prescription and others can be bought over the counter. Ask your nurse if you have questions.     Bring a paper prescription for each of these medications     diazepam 5 MG tablet    HYDROcodone-acetaminophen 5-325 MG per tablet                Protect others around you: Learn how  to safely use, store and throw away your medicines at www.disposemymeds.org.        Information about OPIOIDS     PRESCRIPTION OPIOIDS: WHAT YOU NEED TO KNOW   We gave you an opioid (narcotic) pain medicine. It is important to manage your pain, but opioids are not always the best choice. You should first try all the other options your care team gave you. Take this medicine for as short a time (and as few doses) as possible.     These medicines have risks:    DO NOT drive when on new or higher doses of pain medicine. These medicines can affect your alertness and reaction times, and you could be arrested for driving under the influence (DUI). If you need to use opioids long-term, talk to your care team about driving.    DO NOT operate heave machinery    DO NOT do any other dangerous activities while taking these medicines.     DO NOT drink any alcohol while taking these medicines.      If the opioid prescribed includes acetaminophen, DO NOT take with any other medicines that contain acetaminophen. Read all labels carefully. Look for the word  acetaminophen  or  Tylenol.  Ask your pharmacist if you have questions or are unsure.    You can get addicted to pain medicines, especially if you have a history of addiction (chemical, alcohol or substance dependence). Talk to your care team about ways to reduce this risk.    Store your pills in a secure place, locked if possible. We will not replace any lost or stolen medicine. If you don t finish your medicine, please throw away (dispose) as directed by your pharmacist. The [place] Pollution Control Agency has more information about safe disposal: https://www.pca.[facility].[place].us/living-green/managing-unwanted-medications.     All opioids tend to cause constipation. Drink plenty of water and eat foods that have a lot of fiber, such as fruits, vegetables, prune juice, apple juice and high-fiber cereal. Take a laxative (Miralax, milk of magnesia, Colace, Senna) if you don t move  your bowels at least every other day.              Medication List: This is a list of all your medications and when to take them. Check marks below indicate your daily home schedule. Keep this list as a reference.      Medications           Morning Afternoon Evening Bedtime As Needed    Beclomethasone Dipropionate 80 MCG/ACT Aers Nasal Spray   Commonly known as:  QNASL   Spray 2 sprays into both nostrils daily                                busPIRone 15 MG tablet   Commonly known as:  BUSPAR   TAKE 1 TABLET THREE TIMES A DAY                                CALCIUM-MAGNESIUM-ZINC PO   Take 1 tablet by mouth every evening                                clindamycin 150 MG capsule   Commonly known as:  CLEOCIN   Open one capsule and place in 1 Litre of Normal Saline and mix. Irrigate with 20 cc each nostril QID.                                diazepam 5 MG tablet   Commonly known as:  VALIUM   Take 1 tablet (5 mg) by mouth every 8 hours as needed for muscle spasms                                escitalopram 20 MG tablet   Commonly known as:  LEXAPRO   Take 1 tablet (20 mg) by mouth daily                                HYDROcodone-acetaminophen 5-325 MG per tablet   Commonly known as:  NORCO   Take 1-2 tablets by mouth every 4 hours as needed for other (Moderate to Severe Pain)                                loratadine 10 MG tablet   Commonly known as:  CLARITIN   Take 10 mg by mouth as needed Reported on 3/22/2017                                OMEGA-3 FISH OIL PO   Take 4 capsules by mouth every evening                                omeprazole 40 MG capsule   Commonly known as:  priLOSEC   TAKE 1 CAPSULE DAILY 30 TO 60 MINUTES BEFORE A MEAL (NEED A PHYSICAL IN JULY 2018)                                senna-docusate 8.6-50 MG per tablet   Commonly known as:  SENOKOT-S;PERICOLACE   Take 1-2 tablets by mouth 2 times daily Take while on oral narcotics to prevent or treat constipation.                                sodium  chloride 0.9% (bottle) 0.9 % irrigation   IRRIGATE 20 ML EACH NOSTRIL 4 TIMES DAILY FOR 2 MONTHS.   Last time this was given:  500 mLs on 7/16/2018  9:40 AM                                traZODone 50 MG tablet   Commonly known as:  DESYREL   Take 2 tablets (100 mg) by mouth nightly as needed for sleep                                XIIDRA 5 % Soln opthalmic solution   Apply 1 drop to eye 2 times daily   Generic drug:  Lifitegrast                                zolpidem 6.25 MG CR tablet   Commonly known as:  AMBIEN CR   Take 1 tablet (6.25 mg) by mouth nightly as needed for sleep

## 2018-07-16 NOTE — DISCHARGE INSTRUCTIONS
[facility]  Home Care Following Anesthesia  For 24 hours after surgery:  1. Get plenty of rest.  A responsible adult must stay with you for at least 24 hours after you leave the surgery center.  2. Do not drive or use heavy equipment.  If you have weakness or tingling, don't drive or use heavy equipment until this feeling goes away.   3. Do not drink alcohol.   4. Avoid strenuous or risky activities.  Ask for help when climbing stairs.  5. You may feel lightheaded.  IF so, sit for a few minutes before standing.  Have someone help you get up.   6. If you have nausea (feel sick to your stomach): Drink only clear liquids such as apple juice, ginger ale, broth or 7-Up.  Rest may also help.  Be sure to drink enough fluids.  Move to a regular diet as you feel able.   7. You may have a slight fever.  Call the doctor if your fever is over 100 F (37.7 C) (taken under the tongue) or lasts longer than 24 hours.  8. You may have a dry mouth, a sore throat, muscle aches or trouble sleeping. These should go away after 24 hours.  9. Do not make important or legal decisions.   Tips for taking pain medications  To get the best pain relief possible, remember these points:    Take pain medications as directed, before pain becomes severe.    Pain medication can upset your stomach: taking it with food may help.    Constipation is a common side effect of pain medication. Drink plenty of  fluids.    Eat foods high in fiber. Take a stool softener if recommended by your doctor or pharmacist.    Do not drink alcohol, drive or operate machinery while taking pain medications.    Ask about other ways to control pain, such as with heat, ice or relaxation.    Tylenol/Acetaminophen Consumption  To help encourage the safe use of acetaminophen, the makers of TYLENOL  have lowered the maximum daily dose for single-ingredient Extra Strength TYLENOL  (acetaminophen) products sold in the [place] from 8 pills per day  (4,000 mg) to 6 pills per day (3,000 mg). The dosing interval has also changed from 2 pills every 4-6 hours to 2 pills every 6 hours.    If you feel your pain relief is insufficient, you may take Tylenol/Acetaminophen in addition to your narcotic pain medication.     Be careful not to exceed 3,000 mg of Tylenol/Acetaminophen in a 24 hour period from all sources.    If you are taking extra strength Tylenol/acetaminophen (500 mg), the maximum dose is 6 tablets in 24 hours.    If you are taking regular strength acetaminophen (325 mg), the maximum dose is 9 tablets in 24 hours.    Call a doctor for any of the followin. Signs of infection (fever, growing tenderness at the surgery site, a large amount of drainage or bleeding, severe pain, foul-smelling drainage, redness, swelling).  2. It has been over 8 to 10 hours since surgery and you are still not able to urinate (pass water).  3. Headache for over 24 hours.  Your doctor is:  Dr. Reggie Coffey, General Surgery: [phone]         Or dial [phone] and ask for the resident on call for:  General Surgery  For emergency care, call the:  [facility]:  [phone] (TTY for hearing impaired: [phone])    After your Laparoscopic Cholecystectomy          Incision care     You may take a shower the day after surgery. Carefully wash your incision with soap and water. Do not submerge yourself in water (bath, whirlpool, hot tub, pool, lake) for 14 days after surgery.     Remove the bandage the day after surgery, but leave the medical tape (Steri-Strips) or glue in place. These will loosen and fall off on their own 5 to 7 days after surgery.       Always wash your hands before touching your incisions or removing bandages.      Other medicines     Wait to start aspirin or blood thinners until the day after surgery. You can continue your regular medicines at your normal time the day after surgery.      Your pain medicine may cause constipation (hard,  dry stools). To help with this, take the stool softener your doctor gave you or an over-the-counter stool softener or laxative. You can stop taking this when you are no longer taking pain medicine and your bowel movements are back to normal.      For pain or discomfort     Take the narcotic pain medicine your doctor gave you as needed and as instructed on the bottle. If you prefer to use over-the-counter medication, use acetaminophen (Tylenol) or ibuprofen (Advil, Motrin) as instructed on the box. Do not take Tylenol if it is in your narcotic pain medication.     Use an ice pack on your abdomen (belly) for 20 minutes at a time as needed for the first 24 hours. Be sure to protect your skin by putting a cloth between the ice pack and your skin.     After 24 hours you can switch to heat for 20 minutes as needed. Be sure to protect your skin by putting a cloth between the heat pack and your skin.       Activities     No driving until you feel it s safe to do so. Don t drive while taking narcotic pain medicine.     Don t lift anything heavier than 20 pounds for 3 to 4 weeks after surgery.      Special equipment     If you left the hospital in PARI socks (compression stockings), you may take them off the day after surgery.      Diet     You can eat your regular meals after surgery.      When to call the doctor   Call your doctor if you have:     A fever above 101 F (38.3 C) (taken under the tongue), or a fever or chills lasting more than a day.     Redness at the incision site.     Any fluid or blood draining from the incision, especially if it smells bad.      Severe pain that doesn t improve with pain medicine.          We will call you 2 to 4 days after surgery to review this handout, answer questions and help arrange after-surgery care. If you have questions or concerns, please call [phone] during regular office hours. If you need to call after business hours, call [phone] and ask to page the surgeon on-call.          Transversus Abdominis Plane (TAP) Pain Block      What is a TAP block?   A TAP block can help you manage your pain after surgery. TAP stands for transversus abdominis plane, which is a muscle layer in your abdomen (belly). The TAP block uses numbing medicine similar to Novocaine to block pain near the site of your surgery.       Why get a TAP block?     To better manage your pain after surgery. A tap block will help keep the pain from getting severe and out of control.     To block pain signals from the nerve, which helps decrease pain after surgery.     To help you sleep, easily breathe deeply, walk and visit with others.      How is it done?   You will lie still on a table. We will use an ultrasound machine to help us see the correct muscle layer of your abdomen. Then, we ll use a needle to inject the medicine. We may also give you some sleep medicine to lessen the pain of the injection.       The procedure takes between 5 and 15 minutes. It is usually done right before surgery, but will sometimes be after. It depends on your surgery and care needs.      What can I expect?     You may feel numbness, tingling or a heaviness in your abdomen.      You may have pain control up to 72 hours after surgery.     The TAP block may not lessen all of your surgery pain. But most patients feel 50 to 75 percent less pain than without the block.       Tell your nurse if you have:     Numbness or tingling in areas other than where the injection was     Blurry vision     Ringing in your ears     A metallic taste in your mouth

## 2018-07-16 NOTE — PROGRESS NOTES
Pt tolerated preoperative TAP block well. All vital signs stable. Resting comfortably with call light within reach.

## 2018-07-16 NOTE — ANESTHESIA CARE TRANSFER NOTE
Patient: Iliana Mcfarlane    Procedure(s):  Laparoscopic Cholecystectomy and open umbilical hernia repair - Wound Class: II-Clean Contaminated    Diagnosis: Biliary Colic  Diagnosis Additional Information: No value filed.    Anesthesia Type:   General, ETT     Note:  Airway :Face Mask  Patient transferred to:PACU  Comments: 136/91 97%  97.3-82-12  Handoff Report: Identifed the Patient, Identified the Reponsible Provider, Reviewed the pertinent medical history, Discussed the surgical course, Reviewed Intra-OP anesthesia mangement and issues during anesthesia, Set expectations for post-procedure period and Allowed opportunity for questions and acknowledgement of understanding      Vitals: (Last set prior to Anesthesia Care Transfer)    CRNA VITALS  7/16/2018 0926 - 7/16/2018 1001      7/16/2018             Pulse: 98    SpO2: (!)  86 %    Resp Rate (observed): 14    Resp Rate (set): (!)  6                Electronically Signed By: DARRIN Gooden CRNA  July 16, 2018  10:01 AM

## 2018-07-18 ENCOUNTER — CARE COORDINATION (OUTPATIENT)
Dept: SURGERY | Facility: CLINIC | Age: 53
End: 2018-07-18

## 2018-07-18 LAB — COPATH REPORT: NORMAL

## 2018-07-18 NOTE — PROGRESS NOTES
RN Post-Op/Post-Discharge Care Coordination Note    Ms. Iliana Mcfarlane is a 53 year old female who underwent Laparoscopic cholecystectomy on 7/16 with  Dr. Reggie Coffey.  Spoke with Patient.    Support  Patient able to care for self independently     Health Status  Fevers/chills: Patient denies any fever or chills.  Nausea/Vomiting: Patient denies nausea/vomiting.  Eating/drinking: Patient is able to eat and drink without any complaints.   Bowel habits: Patient reports no bowel movement since surgery. She is passing gas and using the stool softeners. Discussed increasing to 2 BID and that she can add in Miralax as needed.  Drains (MONIKA): N/A  Incisions: Patient denies any signs and symptoms of infection..  Wound closure:  Steri-strips   Pain: patient is alternating between Ibuprofen and Norco. Discussed when weaning off Norco she can use Tylenol prn.  New Medications:  Valium, Norco, Senna    Activity/Restrictions  No lifting in excess of 15-20 pounds for 3-4 weeks    Equipment  None    Pathology reviewed with patient:  No: pending    Forms/Letters  No    All of her questions were answered including reviewing restrictions, and wound care.  She will call this office if she has any further questions and/or concerns.      In lieu of a post-op clinic patient that patient would like to be contacted in approximately 10 days via telephone.    Whom and When to Call  Patient acknowledges understanding of how to manage any medication changes and   when to seek medical care.     Patient advised that if after hour medical concerns arise to please call [phone] and choose option 4 to speak to the physician on call.     A copy of this note was routed to the primary surgeon.

## 2018-07-27 ENCOUNTER — CARE COORDINATION (OUTPATIENT)
Dept: SURGERY | Facility: CLINIC | Age: 53
End: 2018-07-27

## 2018-07-27 NOTE — PROGRESS NOTES
Iliana Ramos was contacted several days post procedure via telephone for a status update and elected to have a telephone follow -up call approximately 10 days after original contact in lieu of a clinic visit with Dr. Reggie Coffey.  She continues to do well and the steri-strips have peeled off.  The patients wounds are healed and the area is C/D/I.  She is afebrile, pain free, and danish po; the patient is slowly resuming her normal activity and she is adhering to lifting restrictions.       Pathology was reviewed with the patient: Yes    All of Iliana Ramos question's were answered and  she would like to return to the clinic on a PRN basis.      A copy of this note was routed to his surgeon.      Pathology:  Copath Report 07/16/2018  9:36 AM 88   Patient Name: ILIANA RAMOS   MR#: [id number]   Specimen #: [id number]   Collected: 7/16/2018   Received: 7/16/2018   Reported: 7/18/2018 11:44   Ordering Phy(s): REGGIE COFFEY     For improved result formatting, select 'View Enhanced Report Format' under    Linked Documents section.     SPECIMEN(S):   Gallbladder and contents     FINAL DIAGNOSIS:   GALLBLADDER AND CONTENTS, CHOLECYSTECTOMY:   - Chronic cholecystitis, cholelithiasis   - One benign lymph node

## 2018-11-28 ENCOUNTER — OFFICE VISIT (OUTPATIENT)
Dept: FAMILY MEDICINE | Facility: CLINIC | Age: 53
End: 2018-11-28
Payer: COMMERCIAL

## 2018-11-28 VITALS
RESPIRATION RATE: 18 BRPM | BODY MASS INDEX: 26.75 KG/M2 | WEIGHT: 151 LBS | TEMPERATURE: 97.6 F | SYSTOLIC BLOOD PRESSURE: 119 MMHG | HEART RATE: 90 BPM | OXYGEN SATURATION: 97 % | HEIGHT: 63 IN | DIASTOLIC BLOOD PRESSURE: 81 MMHG

## 2018-11-28 DIAGNOSIS — Z00.00 ROUTINE GENERAL MEDICAL EXAMINATION AT A HEALTH CARE FACILITY: Primary | ICD-10-CM

## 2018-11-28 DIAGNOSIS — K21.9 GASTROESOPHAGEAL REFLUX DISEASE, ESOPHAGITIS PRESENCE NOT SPECIFIED: ICD-10-CM

## 2018-11-28 DIAGNOSIS — Z23 NEED FOR VACCINATION: ICD-10-CM

## 2018-11-28 LAB — HBA1C MFR BLD: 5.5 % (ref 0–5.6)

## 2018-11-28 PROCEDURE — 80061 LIPID PANEL: CPT | Performed by: NURSE PRACTITIONER

## 2018-11-28 PROCEDURE — 82947 ASSAY GLUCOSE BLOOD QUANT: CPT | Performed by: NURSE PRACTITIONER

## 2018-11-28 PROCEDURE — 83036 HEMOGLOBIN GLYCOSYLATED A1C: CPT | Performed by: NURSE PRACTITIONER

## 2018-11-28 PROCEDURE — 90471 IMMUNIZATION ADMIN: CPT | Performed by: NURSE PRACTITIONER

## 2018-11-28 PROCEDURE — 90750 HZV VACC RECOMBINANT IM: CPT | Performed by: NURSE PRACTITIONER

## 2018-11-28 PROCEDURE — 99396 PREV VISIT EST AGE 40-64: CPT | Mod: 25 | Performed by: NURSE PRACTITIONER

## 2018-11-28 PROCEDURE — 36415 COLL VENOUS BLD VENIPUNCTURE: CPT | Performed by: NURSE PRACTITIONER

## 2018-11-28 RX ORDER — OMEPRAZOLE 40 MG/1
CAPSULE, DELAYED RELEASE ORAL
Qty: 90 CAPSULE | Status: SHIPPED | OUTPATIENT
Start: 2018-11-28 | End: 2019-11-13

## 2018-11-28 ASSESSMENT — ENCOUNTER SYMPTOMS
SHORTNESS OF BREATH: 0
WEAKNESS: 0
HEMATURIA: 0
PALPITATIONS: 0
DYSURIA: 0
SORE THROAT: 0
NERVOUS/ANXIOUS: 0
FEVER: 0
PARESTHESIAS: 0
COUGH: 0
HEARTBURN: 0
BREAST MASS: 0
NAUSEA: 0
DIZZINESS: 0
HEMATOCHEZIA: 0
DIARRHEA: 0
HEADACHES: 0
EYE PAIN: 0
CONSTIPATION: 0
MYALGIAS: 0
JOINT SWELLING: 0
FREQUENCY: 0
CHILLS: 0
ARTHRALGIAS: 0
ABDOMINAL PAIN: 0
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
## 2018-11-28 NOTE — MR AVS SNAPSHOT
After Visit Summary   11/28/2018    Iliana Mcfarlane    MRN: [id number]           Patient Information     Date Of Birth          1965        Visit Information        Provider Department      11/28/2018 8:00 AM Janey Aguirre NP [facility]        Today's Diagnoses     Routine general medical examination at a health care facility    -  1    Gastroesophageal reflux disease, esophagitis presence not specified          Care Instructions      Preventive Health Recommendations  Female Ages 50 - 64    Yearly exam: See your health care provider every year in order to  o Review health changes.   o Discuss preventive care.    o Review your medicines if your doctor has prescribed any.      Get a Pap test every three years (unless you have an abnormal result and your provider advises testing more often).    If you get Pap tests with HPV test, you only need to test every 5 years, unless you have an abnormal result.     You do not need a Pap test if your uterus was removed (hysterectomy) and you have not had cancer.    You should be tested each year for STDs (sexually transmitted diseases) if you're at risk.     Have a mammogram every 1 to 2 years.    Have a colonoscopy at age 50, or have a yearly FIT test (stool test). These exams screen for colon cancer.      Have a cholesterol test every 5 years, or more often if advised.    Have a diabetes test (fasting glucose) every three years. If you are at risk for diabetes, you should have this test more often.     If you are at risk for osteoporosis (brittle bone disease), think about having a bone density scan (DEXA).    Shots: Get a flu shot each year. Get a tetanus shot every 10 years.    Nutrition:     Eat at least 5 servings of fruits and vegetables each day.    Eat whole-grain bread, whole-wheat pasta and brown rice instead of white grains and rice.    Get adequate Calcium and Vitamin D.     Lifestyle    Exercise at least 150 minutes a  week (30 minutes a day, 5 days a week). This will help you control your weight and prevent disease.    Limit alcohol to one drink per day.    No smoking.     Wear sunscreen to prevent skin cancer.     See your dentist every six months for an exam and cleaning.    See your eye doctor every 1 to 2 years.            Follow-ups after your visit        Your next 10 appointments already scheduled     Dec 12, 2018  9:00 AM CST   (Arrive by 8:45 AM)   NEW FEMALE PELVIC with Greta Grace MD   [facility]rologic [facility] ([facility])    [address]  [address] [facility] [address] [phone]              Future tests that were ordered for you today     Open Future Orders        Priority Expected Expires Ordered    Fecal colorectal cancer screen (FIT) Routine 12/19/2018 2/20/2019 11/28/2018            Who to contact     If you have questions or need follow up information about today's clinic visit or your schedule please contact [facility] directly at [phone].  Normal or non-critical lab and imaging results will be communicated to you by MyChart, letter or phone within 4 business days after the clinic has received the results. If you do not hear from us within 7 days, please contact the clinic through [employer] or phone. If you have a critical or abnormal lab result, we will notify you by phone as soon as possible.  Submit refill requests through [employer] or call your pharmacy and they will forward the refill request to us. Please allow 3 business days for your refill to be completed.          Additional Information About Your Visit        [employer] Information     [employer] gives you secure access to your electronic health record. If you see a primary care provider, you can also send messages to your care team and make appointments. If you have questions, please call your primary care clinic.  If you do not have a primary care  "provider, please call [phone] and they will assist you.        Care EveryWhere ID     This is your Care EveryWhere ID. This could be used by other organizations to access your [place] medical records  [id number]        Your Vitals Were     Pulse Temperature Respirations Height Pulse Oximetry BMI (Body Mass Index)    90 97.6  F (36.4  C) (Oral) 18 5' 3\" (1.6 m) 97% 26.75 kg/m2       Blood Pressure from Last 3 Encounters:   07/16/18 (!) 142/103   07/12/18 108/70   07/10/18 116/78    Weight from Last 3 Encounters:   11/28/18 151 lb (68.5 kg)   07/16/18 145 lb (65.8 kg)   07/12/18 145 lb (65.8 kg)              We Performed the Following     Glucose     Hemoglobin A1c     Lipid panel reflex to direct LDL Fasting          Today's Medication Changes          These changes are accurate as of 11/28/18  8:40 AM.  If you have any questions, ask your nurse or doctor.               These medicines have changed or have updated prescriptions.        Dose/Directions    clindamycin 150 MG capsule   Commonly known as:  CLEOCIN   This may have changed:    - when to take this  - reasons to take this  - additional instructions   Used for:  Chronic maxillary sinusitis, Chronic rhinitis, Nasal turbinate hypertrophy, Deviated nasal septum, Uncomplicated asthma, unspecified asthma severity        Open one capsule and place in 1 Litre of Normal Saline and mix. Irrigate with 20 cc each nostril QID.   Quantity:  10 capsule   Refills:  3       omeprazole 40 MG DR capsule   Commonly known as:  priLOSEC   This may have changed:  See the new instructions.   Used for:  Gastroesophageal reflux disease, esophagitis presence not specified   Changed by:  Janey Aguirre, NP        TAKE 1 CAPSULE DAILY 30 TO 60 MINUTES BEFORE A MEAL   Quantity:  90 capsule   Refills:  PRN            Where to get your medicines      These medications were sent to [facility] [address] [place] [address] AT [address] [place] [address]  " [address] [facility] [address]     Phone:  [phone]     omeprazole 40 MG DR capsule                Primary Care Provider Office Phone # Fax #    Janey HOWARD CHU Aguirre [phone] [address] [place]  [facility] [address]        Equal Access to Services     ASTER GONZALEZ : Hadii aad ku hadasho Soomaali, waaxda luqadaha, qaybta kaalmada adeegyada, waxay idiin hayaan adeeg kharash la'aan . So [facility] [phone].    ATENCIÓN: Si habla español, tiene a case disposición servicios gratuitos de asistencia lingüística. Vargas al [phone].    We comply with applicable federal civil rights laws and [place] laws. We do not discriminate on the basis of race, color, national origin, age, disability, sex, sexual orientation, or gender identity.            Thank you!     Thank you for choosing [facility]  for your care. Our goal is always to provide you with excellent care. Hearing back from our patients is one way we can continue to improve our services. Please take a few minutes to complete the written survey that you may receive in the mail after your visit with us. Thank you!             Your Updated Medication List - Protect others around you: Learn how to safely use, store and throw away your medicines at www.disposemymeds.org.          This list is accurate as of 11/28/18  8:40 AM.  Always use your most recent med list.                   Brand Name Dispense Instructions for use Diagnosis    beclomethasone 80 MCG/ACT nasal aerosol    QNASL    8.7 g    Spray 2 sprays into both nostrils daily    Allergic rhinitis caused by mold       busPIRone 15 MG tablet    BUSPAR    270 tablet    TAKE 1 TABLET THREE TIMES A DAY    Anxiety       CALCIUM-MAGNESIUM-ZINC PO      Take 1 tablet by mouth every evening        clindamycin 150 MG capsule    CLEOCIN    10 capsule    Open one capsule and place in 1 Litre of Normal Saline and mix. Irrigate with 20 cc each nostril QID.    Chronic  maxillary sinusitis, Chronic rhinitis, Nasal turbinate hypertrophy, Deviated nasal septum, Uncomplicated asthma, unspecified asthma severity       loratadine 10 MG tablet    CLARITIN     Take 10 mg by mouth as needed Reported on 3/22/2017        OMEGA-3 FISH OIL PO      Take 4 capsules by mouth every evening        omeprazole 40 MG DR capsule    priLOSEC    90 capsule    TAKE 1 CAPSULE DAILY 30 TO 60 MINUTES BEFORE A MEAL    Gastroesophageal reflux disease, esophagitis presence not specified       sodium chloride 0.9% (bottle) 0.9 % irrigation     1000 mL    IRRIGATE 20 ML EACH NOSTRIL 4 TIMES DAILY FOR 2 MONTHS.    Chronic maxillary sinusitis       traZODone 50 MG tablet    DESYREL    180 tablet    Take 2 tablets (100 mg) by mouth nightly as needed for sleep    Primary insomnia       XIIDRA 5 % opthalmic solution   Generic drug:  lifitegrast      Apply 1 drop to eye 2 times daily        zolpidem ER 6.25 MG CR tablet    AMBIEN CR    90 tablet    Take 1 tablet (6.25 mg) by mouth nightly as needed for sleep    Primary insomnia

## 2018-11-28 NOTE — NURSING NOTE
Screening Questionnaire for Adult Immunization    Are you sick today?   No   Do you have allergies to medications, food, a vaccine component or latex?   No   Have you ever had a serious reaction after receiving a vaccination?   No   Do you have a long-term health problem with heart disease, lung disease, asthma, kidney disease, metabolic disease (e.g. diabetes), anemia, or other blood disorder?   No   Do you have cancer, leukemia, HIV/AIDS, or any other immune system problem?   No   In the past 3 months, have you taken medications that affect  your immune system, such as prednisone, other steroids, or anticancer drugs; drugs for the treatment of rheumatoid arthritis, Crohn s disease, or psoriasis; or have you had radiation treatments?   No   Have you had a seizure, or a brain or other nervous system problem?   No   During the past year, have you received a transfusion of blood or blood     products, or been given immune (gamma) globulin or antiviral drug?   No   For women: Are you pregnant or is there a chance you could become        pregnant during the next month?   No   Have you received any vaccinations in the past 4 weeks?   No     Immunization questionnaire answers were all negative.        Per orders of Dr. Aguirre, injection of Shingrix given by Melba Musa. Patient instructed to remain in clinic for 15 minutes afterwards, and to report any adverse reaction to me immediately.       Screening performed by Melba Musa on 11/28/2018 at 9:24 AM.    Due to injection administration, patient instructed to remain in clinic for 15 minutes  afterwards, and to report any adverse reaction to me immediately.  Prior to injection verified patient identity using patient's name and date of birth.  Due to injection administration, patient instructed to remain in clinic for 15 minutes  afterwards, and to report any adverse reaction to me immediately.

## 2018-11-29 LAB
CHOLEST SERPL-MCNC: 262 MG/DL
GLUCOSE SERPL-MCNC: 99 MG/DL (ref 70–99)
HDLC SERPL-MCNC: 80 MG/DL
LDLC SERPL CALC-MCNC: 154 MG/DL
NONHDLC SERPL-MCNC: 182 MG/DL
TRIGL SERPL-MCNC: 142 MG/DL

## 2018-12-03 ENCOUNTER — PRE VISIT (OUTPATIENT)
Dept: UROLOGY | Facility: CLINIC | Age: 53
End: 2018-12-03

## 2018-12-03 NOTE — TELEPHONE ENCOUNTER
Patient with history of stress incontinence coming in for pelvital study consult with Dr. Grace. Patient chart reviewed, no need for call, all records available and ready for appointment.  No Urine (stress incontinence).

## 2018-12-05 PROCEDURE — 82274 ASSAY TEST FOR BLOOD FECAL: CPT | Performed by: NURSE PRACTITIONER

## 2018-12-10 DIAGNOSIS — Z00.00 ROUTINE GENERAL MEDICAL EXAMINATION AT A HEALTH CARE FACILITY: ICD-10-CM

## 2018-12-10 LAB — HEMOCCULT STL QL IA: NEGATIVE

## 2018-12-12 ENCOUNTER — OFFICE VISIT (OUTPATIENT)
Dept: UROLOGY | Facility: CLINIC | Age: 53
End: 2018-12-12
Payer: COMMERCIAL

## 2018-12-12 VITALS
HEIGHT: 64 IN | HEART RATE: 91 BPM | DIASTOLIC BLOOD PRESSURE: 82 MMHG | WEIGHT: 153 LBS | BODY MASS INDEX: 26.12 KG/M2 | SYSTOLIC BLOOD PRESSURE: 134 MMHG

## 2018-12-12 DIAGNOSIS — N39.3 STRESS INCONTINENCE: Primary | ICD-10-CM

## 2018-12-12 ASSESSMENT — ENCOUNTER SYMPTOMS
HOARSE VOICE: 0
NECK MASS: 0
DYSURIA: 0
HOT FLASHES: 0
TASTE DISTURBANCE: 0
HEMATURIA: 0
DECREASED LIBIDO: 0
SINUS CONGESTION: 0
SINUS PAIN: 0
TROUBLE SWALLOWING: 0
DIFFICULTY URINATING: 0
FLANK PAIN: 0
SORE THROAT: 1
SMELL DISTURBANCE: 0

## 2018-12-12 ASSESSMENT — PAIN SCALES - GENERAL: PAINLEVEL: NO PAIN (0)

## 2018-12-12 ASSESSMENT — MIFFLIN-ST. JEOR: SCORE: 1294

## 2018-12-12 NOTE — NURSING NOTE
Chief Complaint   Patient presents with     Consult     stress incontinence coming in for pelvital study consult        Jessica Christine MA

## 2018-12-12 NOTE — PROGRESS NOTES
CC:  Stress incontinence.     HPI:  Iliana Mcfarlane is a 53 year old female new to our clinic for the above.  This problem has been going on for about 6-8 months and getting a little worse.  It is associated with coughing or sneezing.  She has daily episodes of incontinence per day but has not been wearing pads.  She has  had no previous treatment for her condition.  The patient voids q3 hours, nocturia X 2.  She drinks mostly water or green tea.  She denies any dysuria,  hematuria, hesitancy, intermittency, feeling of incomplete emptying, or any recent hx of UTI's or stones.    The patient has no constipation or splinting.  She is sexually active and denies any dyspareunia or pelvic pain.   She denies any vaginal bulge.  She has no neurological or balance problems.     Obstetric Hx:  She is .  The weight of her largest baby was 6 lbs 8oz.  Babies were delivered vaginally.  Periods are irregular, near menopause    Past Medical History:   Diagnosis Date     Allergic rhinitis late      Allergic rhinitis, cause unspecified      Anxiety      Depression, major      Depressive disorder      Esophageal reflux      Genital herpes      Glaucoma      Hoarseness late      Lumbago      Menarche age 16     Menorrhagia      Nephrolithiasis      PONV (postoperative nausea and vomiting)      Reduced vision        Past Surgical History:   Procedure Laterality Date     C NONSPECIFIC PROCEDURE      Upper GI endoscopy     C NONSPECIFIC PROCEDURE      Tonsillectomy     C NONSPECIFIC PROCEDURE  85    Oral surgery, wisdom teeth     C NONSPECIFIC PROCEDURE           C NONSPECIFIC PROCEDURE      Hysteroscopy, ablation of uterine fibroid     C NONSPECIFIC PROCEDURE      miscarriage     CATARACT IOL, RT/LT       COMBINED CYSTOSCOPY, RETROGRADES, URETEROSCOPY, LASER HOLMIUM LITHOTRIPSY URETER(S), INSERT STENT Right 2017    Procedure: COMBINED CYSTOSCOPY, RETROGRADES, URETEROSCOPY, LASER HOLMIUM LITHOTRIPSY  URETER(S), INSERT STENT;  Cystoscopy, Right Ureteroscopy with Holmium Laser Lithotripsy, Right Stent Placement;  Surgeon: Helio Lauren MD;  [facility]: [facility] OR     ENT SURGERY      tonsillectomy     EYE SURGERY  2011    trabeculectomy     GI SURGERY      dairy intolerence - resolved     HEAD & NECK SURGERY      trabeculectomy, cataract, yag     LAPAROSCOPIC CHOLECYSTECTOMY N/A 7/16/2018    Procedure: LAPAROSCOPIC CHOLECYSTECTOMY;  Laparoscopic Cholecystectomy and open umbilical hernia repair;  Surgeon: Reggie Coffey MD;  [facility]     OPERATIVE HYSTEROSCOPY WITH MORCELLATOR  7/25/2014    Procedure: OPERATIVE HYSTEROSCOPY WITH MORCELLATOR;  Surgeon: Maureen Tavarez MD;  Location: [facility] OR     SURGICAL HISTORY OF - 12/11    right eye trabeculectomy     TONSILLECTOMY  1987       Meds, Allergies, FHx and SHx reviewed per nurse's intake note.    ROS below.  All other positive and pertinent information is mentioned in the HPI.  Answers for HPI/ROS submitted by the patient on 12/12/2018   General Symptoms: No  Skin Symptoms: No  HENT Symptoms: Yes  EYE SYMPTOMS: No  HEART SYMPTOMS: No  LUNG SYMPTOMS: No  INTESTINAL SYMPTOMS: No  URINARY SYMPTOMS: Yes  GYNECOLOGIC SYMPTOMS: Yes  BREAST SYMPTOMS: No  SKELETAL SYMPTOMS: No  BLOOD SYMPTOMS: No  NERVOUS SYSTEM SYMPTOMS: No  MENTAL HEALTH SYMPTOMS: No  Ear pain: No  Ear discharge: No  Hearing loss: No  Tinnitus: No  Nosebleeds: No  Congestion: No  Sinus pain: No  Trouble swallowing: No   Voice hoarseness: No  Mouth sores: No  Sore throat: Yes  Tooth pain: No  Gum tenderness: No  Bleeding gums: No  Change in taste: No  Change in sense of smell: No  Dry mouth: No  Hearing aid used: No  Neck lump: No  Trouble holding urine or incontinence: Yes  Pain or burning: No  Trouble starting or stopping: No  Increased frequency of urination: No  Blood in urine: No  Decreased frequency of urination: No  Frequent nighttime urination: No  Flank pain: No  Difficulty emptying  "bladder: No  Bleeding or spotting between periods: Yes  Heavy or painful periods: No  Irregular periods: Yes  Vaginal discharge: No  Hot flashes: No  Vaginal dryness: No  Genital ulcers: No  Reduced libido: No  Painful intercourse: No  Difficulty with sexual arousal: No  Post-menopausal bleeding: No    PEx:   Blood pressure 134/82, pulse 91, height 1.626 m (5' 4\"), weight 69.4 kg (153 lb), not currently breastfeeding.  5' 4\", Body mass index is 26.26 kg/m ., 153 lbs 0 oz  Gen appearance:  Well groomed  HEENT:  EOMI, AT NC  Psych:  Normal Affect  Neuro:  A/O X 3  Skin:  Warm to touch  Resp:  No increased respiratory effort  Vasc:  RRR  lymph:  No LE edema  Abd:  Soft/NT, ND, no palpable masses  Musk:  Full ROM in extremities  :  Normal external genitalia and introitus, no atrophic changes          Urethra with hypermobility          Stress incontinence was demonstrated with coughing          Grade I cystocele and grade I rectocele          Pelvic floor muscles are of normal tonicity, non tender          Able to localize pelvic floor muscles.  Perineum WNL.    UA: UA RESULTS:  Recent Labs   Lab Test 02/22/18  0924   COLOR Yellow   APPEARANCE Clear   URINEGLC Negative   URINEBILI Negative   URINEKETONE Negative   SG >1.030   UBLD Trace*   URINEPH 5.5   PROTEIN Trace*   UROBILINOGEN 0.2   NITRITE Negative   LEUKEST Trace*   RBCU O - 2   WBCU O - 2       Orders Only on 12/10/2018   Component Date Value Ref Range Status     Occult Blood Scn FIT 12/05/2018 Negative  NEG^Negative Final       ASSESSMENT and PLAN:  This is a 53 year old female with stress incontinence.  Different management options were discussed with the patient including observation, pelvic floor PT, Pelvital study, and midurethral sling.  The patient has elected to proceed with Pelvital study.  -enroll into study  -f/u per protocol.    Thank you for allowing me to participate in Ms. Mcfarlane's care.  I will keep you updated on her progress.    Greta COTE" MD Sanjay

## 2018-12-12 NOTE — LETTER
2018       RE: Iliana Mcfarlane  [address]  [facility] [address]     Dear Colleague,    Thank you for referring your patient, Iliana Mcfarlane, to the [facility] UROLOGY AND INST FOR PROSTATE AND UROLOGIC CANCERS at [facility]. Please see a copy of my visit note below.    CC:  Stress incontinence.     HPI:  Iliana Mcfarlane is a 53 year old female new to our clinic for the above.  This problem has been going on for about 6-8 months and getting a little worse.  It is associated with coughing or sneezing.  She has daily episodes of incontinence per day but has not been wearing pads.  She has  had no previous treatment for her condition.  The patient voids q3 hours, nocturia X 2.  She drinks mostly water or green tea.  She denies any dysuria,  hematuria, hesitancy, intermittency, feeling of incomplete emptying, or any recent hx of UTI's or stones.    The patient has no constipation or splinting.  She is sexually active and denies any dyspareunia or pelvic pain.   She denies any vaginal bulge.  She has no neurological or balance problems.     Obstetric Hx:  She is .  The weight of her largest baby was 6 lbs 8oz.  Babies were delivered vaginally.  Periods are irregular, near menopause    Past Medical History:   Diagnosis Date     Allergic rhinitis late      Allergic rhinitis, cause unspecified      Anxiety      Depression, major      Depressive disorder      Esophageal reflux      Genital herpes      Glaucoma      Hoarseness late      Lumbago      Menarche age 16     Menorrhagia      Nephrolithiasis      PONV (postoperative nausea and vomiting)      Reduced vision        Past Surgical History:   Procedure Laterality Date     C NONSPECIFIC PROCEDURE      Upper GI endoscopy     C NONSPECIFIC PROCEDURE      Tonsillectomy     C NONSPECIFIC PROCEDURE  85    Oral surgery, wisdom teeth     C NONSPECIFIC PROCEDURE           C NONSPECIFIC  PROCEDURE  2/03    Hysteroscopy, ablation of uterine fibroid     C NONSPECIFIC PROCEDURE      miscarriage     CATARACT IOL, RT/LT       COMBINED CYSTOSCOPY, RETROGRADES, URETEROSCOPY, LASER HOLMIUM LITHOTRIPSY URETER(S), INSERT STENT Right 11/30/2017    Procedure: COMBINED CYSTOSCOPY, RETROGRADES, URETEROSCOPY, LASER HOLMIUM LITHOTRIPSY URETER(S), INSERT STENT;  Cystoscopy, Right Ureteroscopy with Holmium Laser Lithotripsy, Right Stent Placement;  Surgeon: Helio Lauren MD;  [facility]     ENT SURGERY      tonsillectomy     EYE SURGERY  2011    trabeculectomy     GI SURGERY      dairy intolerence - resolved     HEAD & NECK SURGERY      trabeculectomy, cataract, yag     LAPAROSCOPIC CHOLECYSTECTOMY N/A 7/16/2018    Procedure: LAPAROSCOPIC CHOLECYSTECTOMY;  Laparoscopic Cholecystectomy and open umbilical hernia repair;  Surgeon: Reggie Coffey MD;  [facility]     OPERATIVE HYSTEROSCOPY WITH MORCELLATOR  7/25/2014    Procedure: OPERATIVE HYSTEROSCOPY WITH MORCELLATOR;  Surgeon: Maureen Tavarez MD;  [facility] OR     SURGICAL HISTORY OF -   12/11    right eye trabeculectomy     TONSILLECTOMY  1987       Meds, Allergies, FHx and SHx reviewed per nurse's intake note.    ROS below.  All other positive and pertinent information is mentioned in the HPI.  Answers for HPI/ROS submitted by the patient on 12/12/2018   General Symptoms: No  Skin Symptoms: No  HENT Symptoms: Yes  EYE SYMPTOMS: No  HEART SYMPTOMS: No  LUNG SYMPTOMS: No  INTESTINAL SYMPTOMS: No  URINARY SYMPTOMS: Yes  GYNECOLOGIC SYMPTOMS: Yes  BREAST SYMPTOMS: No  SKELETAL SYMPTOMS: No  BLOOD SYMPTOMS: No  NERVOUS SYSTEM SYMPTOMS: No  MENTAL HEALTH SYMPTOMS: No  Ear pain: No  Ear discharge: No  Hearing loss: No  Tinnitus: No  Nosebleeds: No  Congestion: No  Sinus pain: No  Trouble swallowing: No   Voice hoarseness: No  Mouth sores: No  Sore throat: Yes  Tooth pain: No  Gum tenderness: No  Bleeding gums: No  Change in taste: No  Change  "in sense of smell: No  Dry mouth: No  Hearing aid used: No  Neck lump: No  Trouble holding urine or incontinence: Yes  Pain or burning: No  Trouble starting or stopping: No  Increased frequency of urination: No  Blood in urine: No  Decreased frequency of urination: No  Frequent nighttime urination: No  Flank pain: No  Difficulty emptying bladder: No  Bleeding or spotting between periods: Yes  Heavy or painful periods: No  Irregular periods: Yes  Vaginal discharge: No  Hot flashes: No  Vaginal dryness: No  Genital ulcers: No  Reduced libido: No  Painful intercourse: No  Difficulty with sexual arousal: No  Post-menopausal bleeding: No    PEx:   Blood pressure 134/82, pulse 91, height 1.626 m (5' 4\"), weight 69.4 kg (153 lb), not currently breastfeeding.  5' 4\", Body mass index is 26.26 kg/m ., 153 lbs 0 oz  Gen appearance:  Well groomed  HEENT:  EOMI, AT NC  Psych:  Normal Affect  Neuro:  A/O X 3  Skin:  Warm to touch  Resp:  No increased respiratory effort  Vasc:  RRR  lymph:  No LE edema  Abd:  Soft/NT, ND, no palpable masses  Musk:  Full ROM in extremities  :  Normal external genitalia and introitus, no atrophic changes          Urethra with hypermobility          Stress incontinence was demonstrated with coughing          Grade I cystocele and grade I rectocele          Pelvic floor muscles are of normal tonicity, non tender          Able to localize pelvic floor muscles.  Perineum WNL.    UA: UA RESULTS:  Recent Labs   Lab Test 02/22/18  0924   COLOR Yellow   APPEARANCE Clear   URINEGLC Negative   URINEBILI Negative   URINEKETONE Negative   SG >1.030   UBLD Trace*   URINEPH 5.5   PROTEIN Trace*   UROBILINOGEN 0.2   NITRITE Negative   LEUKEST Trace*   RBCU O - 2   WBCU O - 2       Orders Only on 12/10/2018   Component Date Value Ref Range Status     Occult Blood Scn FIT 12/05/2018 Negative  NEG^Negative Final       ASSESSMENT and PLAN:  This is a 53 year old female with stress incontinence.  Different management " options were discussed with the patient including observation, pelvic floor PT, Pelvital study, and midurethral sling.  The patient has elected to proceed with Pelvital study.  -enroll into study  -f/u per protocol.    Thank you for allowing me to participate in Ms. Mcfarlane's care.  I will keep you updated on her progress.      Greta Grace MD

## 2018-12-21 ENCOUNTER — ALLIED HEALTH/NURSE VISIT (OUTPATIENT)
Dept: UROLOGY | Facility: CLINIC | Age: 53
End: 2018-12-21
Payer: COMMERCIAL

## 2018-12-21 DIAGNOSIS — N39.3 STRESS INCONTINENCE: Primary | ICD-10-CM

## 2018-12-21 NOTE — PROGRESS NOTES
Patient here for the pelvital study that  Dr. Grace has for stress incontinence. The study details was reviewed with the patient. She is to use the device daily for 5 minutes at the same time. Demonstrated how to clean the device without submerging the probe and using a mild dishwashing soap.   Patient was also instructed on troubleshooting the device if needed. Patient verbalized understanding.   Noted to at the end of using the device the patient had ray red blood on the probe. She is experiencing spotting at times. This was not caused by the probe.    Iliana Mcfarlane comes into clinic today at the request of Sanjay Ordering Provider for Pt Teaching pelvital training.        This service provided today was under the supervising provider of the day dr Yoseph Foster, who was available if needed.    Laura Brooks RN

## 2019-01-04 ENCOUNTER — APPOINTMENT (OUTPATIENT)
Dept: UROLOGY | Facility: CLINIC | Age: 54
End: 2019-01-04
Payer: COMMERCIAL

## 2019-01-17 ENCOUNTER — MYC REFILL (OUTPATIENT)
Dept: FAMILY MEDICINE | Facility: CLINIC | Age: 54
End: 2019-01-17

## 2019-01-17 ENCOUNTER — MYC MEDICAL ADVICE (OUTPATIENT)
Dept: FAMILY MEDICINE | Facility: CLINIC | Age: 54
End: 2019-01-17

## 2019-01-17 DIAGNOSIS — F51.01 PRIMARY INSOMNIA: ICD-10-CM

## 2019-01-17 RX ORDER — ZOLPIDEM TARTRATE 6.25 MG/1
6.25 TABLET, FILM COATED, EXTENDED RELEASE ORAL
Qty: 90 TABLET | Refills: 1 | Status: CANCELLED | OUTPATIENT
Start: 2019-01-17

## 2019-01-18 ENCOUNTER — APPOINTMENT (OUTPATIENT)
Dept: UROLOGY | Facility: CLINIC | Age: 54
End: 2019-01-18
Payer: COMMERCIAL

## 2019-01-18 NOTE — TELEPHONE ENCOUNTER
MARIELENA Aguirre review:   Zolpidem RX request.        Last Written Prescription Date:  3/26/18  Last Fill Quantity: 90,   # refills: 1  Last Office Visit: 11/28/18         Routing refill request to provider for review/approval because:  Drug not on the [facility], [facility] or [facility] refill protocol or controlled substance  Sirena Glass RN

## 2019-01-21 RX ORDER — ZOLPIDEM TARTRATE 6.25 MG/1
6.25 TABLET, FILM COATED, EXTENDED RELEASE ORAL
Qty: 90 TABLET | Refills: 1 | Status: SHIPPED | OUTPATIENT
Start: 2019-01-21 | End: 2019-02-12

## 2019-01-22 NOTE — TELEPHONE ENCOUNTER
I faxed Rx for Ambien 6.25 MG CR tablet to [facility]   Start date: 01/21/2019    Netta Reyes MA

## 2019-02-05 ENCOUNTER — APPOINTMENT (OUTPATIENT)
Dept: UROLOGY | Facility: CLINIC | Age: 54
End: 2019-02-05
Payer: COMMERCIAL

## 2019-02-11 ENCOUNTER — MYC REFILL (OUTPATIENT)
Dept: FAMILY MEDICINE | Facility: CLINIC | Age: 54
End: 2019-02-11

## 2019-02-11 DIAGNOSIS — F51.01 PRIMARY INSOMNIA: ICD-10-CM

## 2019-02-11 RX ORDER — ZOLPIDEM TARTRATE 6.25 MG/1
6.25 TABLET, FILM COATED, EXTENDED RELEASE ORAL
Qty: 90 TABLET | Refills: 1 | Status: CANCELLED | OUTPATIENT
Start: 2019-02-11

## 2019-02-12 RX ORDER — ZOLPIDEM TARTRATE 5 MG/1
5 TABLET ORAL
Qty: 90 TABLET | Refills: 1 | Status: SHIPPED | OUTPATIENT
Start: 2019-02-12 | End: 2019-08-19

## 2019-02-12 NOTE — TELEPHONE ENCOUNTER
Nathalie-Please review patient's message.  Writer unsure what equivalent formulation non-CR tablet would be, so did not pend medication.    Pharmacy pended.    Thank you!  ROCAEL Madrigal, BSN, RN

## 2019-02-12 NOTE — TELEPHONE ENCOUNTER
I faxed Rx for Ambien 5 MG tablet to [facility]   Start date: 02/12/2019    Netta Reyes MA

## 2019-02-15 ENCOUNTER — APPOINTMENT (OUTPATIENT)
Dept: UROLOGY | Facility: CLINIC | Age: 54
End: 2019-02-15
Payer: COMMERCIAL

## 2019-03-05 ENCOUNTER — PRE VISIT (OUTPATIENT)
Dept: UROLOGY | Facility: CLINIC | Age: 54
End: 2019-03-05

## 2019-03-05 NOTE — TELEPHONE ENCOUNTER
Reason for Visit: Pelvital study follow up    Diagnosis: Stress incontinence    Contact Patient: no    Rooming Requirements: No urine or PVR      Barb Rodriguez LPN  03/05/19  11:55 AM

## 2019-03-13 ENCOUNTER — OFFICE VISIT (OUTPATIENT)
Dept: UROLOGY | Facility: CLINIC | Age: 54
End: 2019-03-13

## 2019-03-13 ENCOUNTER — APPOINTMENT (OUTPATIENT)
Dept: UROLOGY | Facility: CLINIC | Age: 54
End: 2019-03-13

## 2019-03-13 VITALS
DIASTOLIC BLOOD PRESSURE: 90 MMHG | SYSTOLIC BLOOD PRESSURE: 137 MMHG | BODY MASS INDEX: 26.12 KG/M2 | HEART RATE: 86 BPM | WEIGHT: 153 LBS | HEIGHT: 64 IN

## 2019-03-13 DIAGNOSIS — M62.89 PELVIC FLOOR DYSFUNCTION: ICD-10-CM

## 2019-03-13 DIAGNOSIS — N39.3 STRESS INCONTINENCE: Primary | ICD-10-CM

## 2019-03-13 ASSESSMENT — MIFFLIN-ST. JEOR: SCORE: 1289

## 2019-03-13 ASSESSMENT — PAIN SCALES - GENERAL: PAINLEVEL: NO PAIN (0)

## 2019-03-13 NOTE — PROGRESS NOTES
Reason for Visit 12 week Pelvital study f/u    Clinical Data:  Ms. Mcfarlane is a 53 y/o female with hx of stress urinary incontinence.  She has been on the active device the entire 12 weeks.    She does not notice a big improvement with big coughs.  She continues to have the incontinence.    She used it regularly but did not use it when she had her period  She found that the lights lit up at least half way.    She denies any side effects.    A/P:  53 y/o female with stress incontinence who does not feel like the device helped her.  We discussed further options such as observation, conventional PT, and surgery as well as estrogen cream.  She would like to try PT.  -referral to PT  -f/u in 3-4 months to reassess.    Thank you for allowing me to participate in the care of  Ms. Iliana Mcfarlane and I will keep you updated on her progress.    Greta Grace MD

## 2019-03-13 NOTE — NURSING NOTE
"Chief Complaint   Patient presents with     Follow Up     Stress incontinence, Pelvital study follow up week 12       Blood pressure 137/90, pulse 86, height 1.626 m (5' 4\"), weight 69.4 kg (153 lb), not currently breastfeeding. Body mass index is 26.26 kg/m .    Patient Active Problem List   Diagnosis     MENOMETRORRHAGIA     Anxiety     Glaucoma, Axenfeld     Genital herpes     Diverticulosis of large intestine without hemorrhage     Calculus of gallbladder without cholecystitis without obstruction       Allergies   Allergen Reactions     No Known Drug Allergies        Current Outpatient Medications   Medication Sig Dispense Refill     Beclomethasone Dipropionate (QNASL) 80 MCG/ACT AERS Nasal Spray Spray 2 sprays into both nostrils daily 8.7 g 3     busPIRone (BUSPAR) 15 MG tablet TAKE 1 TABLET THREE TIMES A  tablet 2     CALCIUM-MAGNESIUM-ZINC PO Take 1 tablet by mouth every evening        clindamycin (CLEOCIN) 150 MG capsule Open one capsule and place in 1 Litre of Normal Saline and mix. Irrigate with 20 cc each nostril QID. (Patient taking differently: as needed Open one capsule and place in 1 Litre of Normal Saline and mix. Irrigate with 20 cc each nostril QID.) 10 capsule 3     Lifitegrast (XIIDRA) 5 % SOLN Apply 1 drop to eye 2 times daily        loratadine (CLARITIN) 10 MG tablet Take 10 mg by mouth as needed Reported on 3/22/2017       Omega-3 Fatty Acids (OMEGA-3 FISH OIL PO) Take 4 capsules by mouth every evening        omeprazole (PRILOSEC) 40 MG DR capsule TAKE 1 CAPSULE DAILY 30 TO 60 MINUTES BEFORE A MEAL 90 capsule PRN     sodium chloride 0.9%, bottle, 0.9 % irrigation IRRIGATE 20 ML EACH NOSTRIL 4 TIMES DAILY FOR 2 MONTHS. 1000 mL 3     traZODone (DESYREL) 50 MG tablet Take 2 tablets (100 mg) by mouth nightly as needed for sleep 180 tablet PRN     zolpidem (AMBIEN) 5 MG tablet Take 1 tablet (5 mg) by mouth nightly as needed for sleep 90 tablet 1       Social History     Tobacco Use     " Smoking status: Never Smoker     Smokeless tobacco: Never Used   Substance Use Topics     Alcohol use: Yes     Alcohol/week: 0.0 oz     Comment: 1 a day      Drug use: No       Barb Rodriguez LPN  3/13/2019  8:53 AM

## 2019-03-13 NOTE — LETTER
RE: Iliana Mcfarlane  [address] [facility] [address]     Dear Colleague,    Thank you for referring your patient, Iliana Mcfarlane, to the [facility] at [facility]. Please see a copy of my visit note below.    Reason for Visit 12 week Pelvital study f/u    Clinical Data:  Ms. Mcfarlane is a 55 y/o female with hx of stress urinary incontinence.  She has been on the active device the entire 12 weeks.    She does not notice a big improvement with big coughs.  She continues to have the incontinence.    She used it regularly but did not use it when she had her period  She found that the lights lit up at least half way.    She denies any side effects.    A/P:  55 y/o female with stress incontinence who does not feel like the device helped her.  We discussed further options such as observation, conventional PT, and surgery as well as estrogen cream.  She would like to try PT.  -referral to PT  -f/u in 3-4 months to reassess.    Thank you for allowing me to participate in the care of  Ms. Iliana Mcfarlane and I will keep you updated on her progress.    Greta Grace MD

## 2019-06-11 DIAGNOSIS — F51.01 PRIMARY INSOMNIA: ICD-10-CM

## 2019-06-11 RX ORDER — ZOLPIDEM TARTRATE 5 MG/1
5 TABLET ORAL
Qty: 90 TABLET | Refills: 1 | Status: CANCELLED | OUTPATIENT
Start: 2019-06-11

## 2019-06-11 NOTE — TELEPHONE ENCOUNTER
Controlled Substance Refill Request for  Zolpidem Tablet  Problem List Complete:  No     PROVIDER TO CONSIDER COMPLETION OF PROBLEM LIST AND OVERVIEW/CONTROLLED SUBSTANCE AGREEMENT    Last Written Prescription Date:  02/12/2019  Last Fill Quantity: 90,   # refills: 1    [place] Office Visit with [facility] [place]: 11/28/2018    Future Office visit:     Controlled substance agreement:   Encounter-Level CSA:    There are no encounter-level csa.     Patient-Level CSA:    There are no patient-level csa.         Last Urine Drug Screen: No results found for: CDAUT, No results found for: COMDAT, No results found for: THC13, PCP13, COC13, MAMP13, OPI13, AMP13, BZO13, TCA13, MTD13, BAR13, OXY13, PPX13, BUP13     Processing:  Staff will hand deliver Rx to on-site pharmacy     [email or web address]       checked in past 3 months?  No, route to RN

## 2019-06-12 NOTE — TELEPHONE ENCOUNTER
Duplicate see script sent 2/12/2019   zolpidem (AMBIEN) 5 MG tablet 90 tablet 1 2/12/2019 8/11/2019

## 2019-08-19 DIAGNOSIS — F51.01 PRIMARY INSOMNIA: ICD-10-CM

## 2019-08-19 RX ORDER — ZOLPIDEM TARTRATE 5 MG/1
5 TABLET ORAL
Qty: 90 TABLET | Refills: 0 | Status: SHIPPED | OUTPATIENT
Start: 2019-08-19 | End: 2019-11-13

## 2019-08-19 NOTE — TELEPHONE ENCOUNTER
zolpidem (AMBIEN) 5 MG tablet      Last Written Prescription Date:  2-12-19  Last Fill Quantity: 90 tab,   # refills: 1  Last Office Visit: 11-28-18  Future Office visit:       Routing refill request to provider for review/approval because:  Drug not on the [facility], [facility] or [facility] refill protocol or controlled substance

## 2019-10-03 ENCOUNTER — HEALTH MAINTENANCE LETTER (OUTPATIENT)
Age: 54
End: 2019-10-03

## 2019-11-13 ENCOUNTER — OFFICE VISIT (OUTPATIENT)
Dept: FAMILY MEDICINE | Facility: CLINIC | Age: 54
End: 2019-11-13
Payer: COMMERCIAL

## 2019-11-13 VITALS
OXYGEN SATURATION: 98 % | HEART RATE: 103 BPM | DIASTOLIC BLOOD PRESSURE: 88 MMHG | HEIGHT: 63 IN | RESPIRATION RATE: 16 BRPM | WEIGHT: 142 LBS | TEMPERATURE: 98.6 F | BODY MASS INDEX: 25.16 KG/M2 | SYSTOLIC BLOOD PRESSURE: 116 MMHG

## 2019-11-13 DIAGNOSIS — Z23 NEED FOR PROPHYLACTIC VACCINATION AND INOCULATION AGAINST INFLUENZA: ICD-10-CM

## 2019-11-13 DIAGNOSIS — F41.9 ANXIETY: ICD-10-CM

## 2019-11-13 DIAGNOSIS — F51.01 PRIMARY INSOMNIA: ICD-10-CM

## 2019-11-13 DIAGNOSIS — Z23 NEED FOR SHINGLES VACCINE: ICD-10-CM

## 2019-11-13 DIAGNOSIS — H91.93 BILATERAL HEARING LOSS, UNSPECIFIED HEARING LOSS TYPE: ICD-10-CM

## 2019-11-13 DIAGNOSIS — K21.9 GASTROESOPHAGEAL REFLUX DISEASE, ESOPHAGITIS PRESENCE NOT SPECIFIED: ICD-10-CM

## 2019-11-13 DIAGNOSIS — Z11.3 SCREEN FOR STD (SEXUALLY TRANSMITTED DISEASE): ICD-10-CM

## 2019-11-13 DIAGNOSIS — Z00.00 ROUTINE GENERAL MEDICAL EXAMINATION AT A HEALTH CARE FACILITY: Primary | ICD-10-CM

## 2019-11-13 LAB
CHOLEST SERPL-MCNC: 295 MG/DL
GLUCOSE SERPL-MCNC: 99 MG/DL (ref 70–99)
HDLC SERPL-MCNC: 79 MG/DL
LDLC SERPL CALC-MCNC: 194 MG/DL
NONHDLC SERPL-MCNC: 216 MG/DL
TRIGL SERPL-MCNC: 110 MG/DL

## 2019-11-13 PROCEDURE — 90471 IMMUNIZATION ADMIN: CPT | Performed by: NURSE PRACTITIONER

## 2019-11-13 PROCEDURE — 99396 PREV VISIT EST AGE 40-64: CPT | Mod: 25 | Performed by: NURSE PRACTITIONER

## 2019-11-13 PROCEDURE — 87591 N.GONORRHOEAE DNA AMP PROB: CPT | Performed by: NURSE PRACTITIONER

## 2019-11-13 PROCEDURE — 82947 ASSAY GLUCOSE BLOOD QUANT: CPT | Performed by: NURSE PRACTITIONER

## 2019-11-13 PROCEDURE — 87340 HEPATITIS B SURFACE AG IA: CPT | Performed by: NURSE PRACTITIONER

## 2019-11-13 PROCEDURE — 90682 RIV4 VACC RECOMBINANT DNA IM: CPT | Performed by: NURSE PRACTITIONER

## 2019-11-13 PROCEDURE — 86803 HEPATITIS C AB TEST: CPT | Performed by: NURSE PRACTITIONER

## 2019-11-13 PROCEDURE — 80061 LIPID PANEL: CPT | Performed by: NURSE PRACTITIONER

## 2019-11-13 PROCEDURE — 99213 OFFICE O/P EST LOW 20 MIN: CPT | Mod: 25 | Performed by: NURSE PRACTITIONER

## 2019-11-13 PROCEDURE — 87389 HIV-1 AG W/HIV-1&-2 AB AG IA: CPT | Performed by: NURSE PRACTITIONER

## 2019-11-13 PROCEDURE — 90472 IMMUNIZATION ADMIN EACH ADD: CPT | Performed by: NURSE PRACTITIONER

## 2019-11-13 PROCEDURE — 87491 CHLMYD TRACH DNA AMP PROBE: CPT | Performed by: NURSE PRACTITIONER

## 2019-11-13 PROCEDURE — 86780 TREPONEMA PALLIDUM: CPT | Performed by: NURSE PRACTITIONER

## 2019-11-13 PROCEDURE — 36415 COLL VENOUS BLD VENIPUNCTURE: CPT | Performed by: NURSE PRACTITIONER

## 2019-11-13 PROCEDURE — 90750 HZV VACC RECOMBINANT IM: CPT | Performed by: NURSE PRACTITIONER

## 2019-11-13 RX ORDER — ZOLPIDEM TARTRATE 10 MG/1
5-10 TABLET ORAL
Qty: 90 TABLET | Refills: 1 | Status: SHIPPED | OUTPATIENT
Start: 2019-11-13 | End: 2019-11-18

## 2019-11-13 RX ORDER — BUSPIRONE HYDROCHLORIDE 15 MG/1
TABLET ORAL
Qty: 270 TABLET | Refills: 2 | Status: SHIPPED | OUTPATIENT
Start: 2019-11-13 | End: 2019-11-19

## 2019-11-13 RX ORDER — OMEPRAZOLE 40 MG/1
CAPSULE, DELAYED RELEASE ORAL
Qty: 90 CAPSULE | Status: SHIPPED | OUTPATIENT
Start: 2019-11-13

## 2019-11-13 RX ORDER — TRAZODONE HYDROCHLORIDE 50 MG/1
100 TABLET, FILM COATED ORAL
Qty: 180 TABLET | Status: SHIPPED | OUTPATIENT
Start: 2019-11-13 | End: 2019-11-19

## 2019-11-13 ASSESSMENT — MIFFLIN-ST. JEOR: SCORE: 1223.24

## 2019-11-13 ASSESSMENT — ENCOUNTER SYMPTOMS
HEARTBURN: 0
DYSURIA: 0
PALPITATIONS: 0
HEMATURIA: 0
WEAKNESS: 0
PARESTHESIAS: 0
ARTHRALGIAS: 0
DIARRHEA: 0
FREQUENCY: 0
CHILLS: 0
SORE THROAT: 0
DIZZINESS: 0
CONSTIPATION: 0
HEMATOCHEZIA: 0
MYALGIAS: 0
NERVOUS/ANXIOUS: 1
FEVER: 0
BREAST MASS: 0
COUGH: 0
SHORTNESS OF BREATH: 0
ABDOMINAL PAIN: 0
HEADACHES: 1
JOINT SWELLING: 0
NAUSEA: 0
EYE PAIN: 0

## 2019-11-13 ASSESSMENT — ANXIETY QUESTIONNAIRES
GAD7 TOTAL SCORE: 9
1. FEELING NERVOUS, ANXIOUS, OR ON EDGE: NEARLY EVERY DAY
GAD7 TOTAL SCORE: 9
4. TROUBLE RELAXING: MORE THAN HALF THE DAYS
3. WORRYING TOO MUCH ABOUT DIFFERENT THINGS: NOT AT ALL
6. BECOMING EASILY ANNOYED OR IRRITABLE: MORE THAN HALF THE DAYS
GAD7 TOTAL SCORE: 9
2. NOT BEING ABLE TO STOP OR CONTROL WORRYING: SEVERAL DAYS
7. FEELING AFRAID AS IF SOMETHING AWFUL MIGHT HAPPEN: SEVERAL DAYS
7. FEELING AFRAID AS IF SOMETHING AWFUL MIGHT HAPPEN: SEVERAL DAYS
5. BEING SO RESTLESS THAT IT IS HARD TO SIT STILL: NOT AT ALL

## 2019-11-13 ASSESSMENT — PATIENT HEALTH QUESTIONNAIRE - PHQ9
SUM OF ALL RESPONSES TO PHQ QUESTIONS 1-9: 6
10. IF YOU CHECKED OFF ANY PROBLEMS, HOW DIFFICULT HAVE THESE PROBLEMS MADE IT FOR YOU TO DO YOUR WORK, TAKE CARE OF THINGS AT HOME, OR GET ALONG WITH OTHER PEOPLE: NOT DIFFICULT AT ALL
SUM OF ALL RESPONSES TO PHQ QUESTIONS 1-9: 6
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
## 2019-11-13 NOTE — NURSING NOTE
I faxed Biometric screening form and made copy for chart and placed in abstraction     Netta Reyes MA

## 2019-11-14 LAB
C TRACH DNA SPEC QL NAA+PROBE: NEGATIVE
HBV SURFACE AG SERPL QL IA: NONREACTIVE
HCV AB SERPL QL IA: NONREACTIVE
HIV 1+2 AB+HIV1 P24 AG SERPL QL IA: NONREACTIVE
N GONORRHOEA DNA SPEC QL NAA+PROBE: NEGATIVE
SPECIMEN SOURCE: NORMAL
SPECIMEN SOURCE: NORMAL
T PALLIDUM AB SER QL: NONREACTIVE

## 2019-11-14 ASSESSMENT — PATIENT HEALTH QUESTIONNAIRE - PHQ9: SUM OF ALL RESPONSES TO PHQ QUESTIONS 1-9: 6

## 2019-11-14 ASSESSMENT — ANXIETY QUESTIONNAIRES: GAD7 TOTAL SCORE: 9

## 2019-11-15 ENCOUNTER — MYC REFILL (OUTPATIENT)
Dept: FAMILY MEDICINE | Facility: CLINIC | Age: 54
End: 2019-11-15

## 2019-11-15 DIAGNOSIS — F51.01 PRIMARY INSOMNIA: ICD-10-CM

## 2019-11-15 RX ORDER — ZOLPIDEM TARTRATE 10 MG/1
5-10 TABLET ORAL
Qty: 90 TABLET | Refills: 1 | Status: CANCELLED | OUTPATIENT
Start: 2019-11-15

## 2019-11-15 NOTE — TELEPHONE ENCOUNTER
Patient would like this sent to mail order [facility]. Please sign off on med.  thanks

## 2019-11-16 ENCOUNTER — MYC MEDICAL ADVICE (OUTPATIENT)
Dept: FAMILY MEDICINE | Facility: CLINIC | Age: 54
End: 2019-11-16

## 2019-11-18 ENCOUNTER — MYC MEDICAL ADVICE (OUTPATIENT)
Dept: FAMILY MEDICINE | Facility: CLINIC | Age: 54
End: 2019-11-18

## 2019-11-18 DIAGNOSIS — F51.01 PRIMARY INSOMNIA: ICD-10-CM

## 2019-11-18 DIAGNOSIS — F41.9 ANXIETY: ICD-10-CM

## 2019-11-18 DIAGNOSIS — M54.2 CERVICALGIA: Primary | ICD-10-CM

## 2019-11-18 RX ORDER — ZOLPIDEM TARTRATE 10 MG/1
5-10 TABLET ORAL
Qty: 90 TABLET | Refills: 1 | Status: SHIPPED | OUTPATIENT
Start: 2019-11-18 | End: 2020-04-27

## 2019-11-18 RX ORDER — CYCLOBENZAPRINE HCL 10 MG
5-10 TABLET ORAL 3 TIMES DAILY PRN
Qty: 20 TABLET | Refills: 0 | Status: SHIPPED | OUTPATIENT
Start: 2019-11-18 | End: 2021-10-08

## 2019-11-18 NOTE — TELEPHONE ENCOUNTER
"Requested Prescriptions   Pending Prescriptions Disp Refills     busPIRone (BUSPAR) 15 MG tablet  Transferring to Mail Order Pharmacy.   Last Written Prescription Date:  11-13-19  Last Fill Quantity: 270 tab,  # refills: 2   Last office visit: 11/13/2019 with prescribing provider:  Nathalie Aguirre    Future Office Visit:   270 tablet 2     Sig: TAKE 1 TABLET THREE TIMES A DAY       Atypical Antidepressants Protocol Passed - 11/18/2019  3:34 PM        Passed - Recent (12 mo) or future (30 days) visit within the authorizing provider's specialty     Patient has had an office visit with the authorizing provider or a provider within the authorizing providers department within the previous 12 mos or has a future within next 30 days. See \"Patient Info\" tab in inbasket, or \"Choose Columns\" in Meds & Orders section of the refill encounter.          Passed - Medication active on med list        Passed - Patient is age 18 or older        Passed - No active pregnancy on record        Passed - No positive pregnancy test in past 12 mos     ____________________________________         traZODone (DESYREL) 50 MG tablet  Transferring to Mail Order Pharmacy.   Last Written Prescription Date:  11-13-19  Last Fill Quantity: 180 tab,  # refills: PRN   Last office visit: 11/13/2019 with prescribing provider:  Nathalie Aguirre    Future Office Visit:   180 tablet PRN     Sig: Take 2 tablets (100 mg) by mouth nightly as needed for sleep       Serotonin Modulators Passed - 11/18/2019  3:34 PM        Passed - Recent (12 mo) or future (30 days) visit within the authorizing provider's specialty     Patient has had an office visit with the authorizing provider or a provider within the authorizing providers department within the previous 12 mos or has a future within next 30 days. See \"Patient Info\" tab in inbasket, or \"Choose Columns\" in Meds & Orders section of the refill encounter.            Passed - Medication is active on med list        Passed - " Patient is age 18 or older        Passed - No active pregnancy on record        Passed - No positive pregnancy test in past 12 months

## 2019-11-19 RX ORDER — TRAZODONE HYDROCHLORIDE 50 MG/1
100 TABLET, FILM COATED ORAL
Qty: 180 TABLET | Refills: 2 | Status: SHIPPED | OUTPATIENT
Start: 2019-11-19 | End: 2020-09-17

## 2019-11-19 RX ORDER — BUSPIRONE HYDROCHLORIDE 15 MG/1
TABLET ORAL
Qty: 270 TABLET | Refills: 1 | Status: SHIPPED | OUTPATIENT
Start: 2019-11-19 | End: 2020-09-17

## 2019-11-19 NOTE — TELEPHONE ENCOUNTER
Signed Prescriptions:                        Disp   Refills    busPIRone (BUSPAR) 15 MG tablet            270 ta*1        Sig: TAKE 1 TABLET THREE TIMES A DAY  Authorizing Provider: NURIS SALGADO  Ordering User: AMITA AQUINO    traZODone (DESYREL) 50 MG tablet           180 ta*2        Sig: Take 2 tablets (100 mg) by mouth nightly as needed           for sleep  Authorizing Provider: NURIS SALGADO  Ordering User: AMITA AQUINO

## 2020-01-13 NOTE — PATIENT INSTRUCTIONS

## 2020-01-26 ENCOUNTER — TRANSFERRED RECORDS (OUTPATIENT)
Dept: HEALTH INFORMATION MANAGEMENT | Facility: CLINIC | Age: 55
End: 2020-01-26

## 2020-01-26 LAB — COLOGUARD-ABSTRACT: NEGATIVE

## 2020-01-30 ENCOUNTER — TELEPHONE (OUTPATIENT)
Dept: FAMILY MEDICINE | Facility: CLINIC | Age: 55
End: 2020-01-30

## 2020-01-30 NOTE — TELEPHONE ENCOUNTER
Panel Management Review      Patient has the following on her problem list: None      Composite cancer screening  Chart review shows that this patient is due/due soon for the following Fecal Colorectal (FIT) Cologuard   Summary:    Patient is due/failing the following:   Cologuard     Action needed:   Complete Cologuard  [[facility] has made several attempts to encourage your patient to return their sample. We encourage you to contact your patient directly to ensure your patient completes their screeening]    Type of outreach:    Phone, left message for patient to call back.     Questions for provider review:    None                                                                                                                                    Netta Reyes MA       Chart routed to N/A.

## 2020-02-20 NOTE — TELEPHONE ENCOUNTER
Panel Management Review      Patient has the following on her problem list: None      Composite cancer screening  Chart review shows that this patient is due/due soon for the following Fecal Colorectal (FIT) Cologuard  Summary:    Patient is due/failing the following:   I called patient to inquire about Cologuard. She said she completed kit and sent back. I called [facility] and they reported Cologuard was done. They reported they faxed results to clinic on 1/31/2019. I asked to have them re-fax report.    I called patient back and informed her of negative Cologuard. Also informed patient she will be due for MAMMOGRAM in march. Pt will likely not be scheduling mammo in March; too many other things going on. I notified pt to remember to schedule mammogram when time allows.    Action needed:   None at this time     Type of outreach:    Phone, spoke to patient.  (See above)     Questions for provider review:    None                                                                                                                                    Netta Reyes MA       Chart routed to N/A.

## 2020-03-11 ENCOUNTER — TELEPHONE (OUTPATIENT)
Dept: FAMILY MEDICINE | Facility: CLINIC | Age: 55
End: 2020-03-11

## 2020-03-11 NOTE — TELEPHONE ENCOUNTER
Patient called stating she has had a cough which gets worse at night and congested for going on 12 days    Patient denies a fever and asking what should do at home since trying to avoid being seen if possible    Please advise and ok to leave a message

## 2020-03-12 NOTE — TELEPHONE ENCOUNTER
"Spoke with patient. Patient states symptoms started approximately 12 days ago. She reports congestion; coughing fits; headache; yellow sputum and \"tight\" chest. She has used tylenol; and many OTC medications to help with symptoms. She feels she is worsening    She declines fever; difficulty breathing; chest pain; or wheezing.     Writer recommended OTC decongestants; nasal spray (Afrin according to instruction); increased fluid intake; humidity and rest.     Recommended if no improvement in symptoms in a few days, or fever develops make appointment to be seen or do Virtual Visit such as E-Visit or on Care.     Recommended ER if difficulty breathing; wheezing or chest pain    Patient verbalized understanding and is in agreement with this plan    Thank You!  Tatianna Barrow RN  Triage Nurse   "

## 2020-03-13 ENCOUNTER — VIRTUAL VISIT (OUTPATIENT)
Dept: FAMILY MEDICINE | Facility: OTHER | Age: 55
End: 2020-03-13

## 2020-03-13 NOTE — PROGRESS NOTES
"Date: 2020 20:42:23  Clinician: Lorie Lujan  Clinician NPI: [id number]  Patient: Iliana Mcfarlane  Patient : 1965  Patient Address: [address] [place] [address] [place] [address]  Patient Phone: [phone]  Visit Protocol: URI  Patient Summary:  Iliana is a 55 year old ( : 1965 ) female who initiated a Visit for cold, sinus infection, or influenza. When asked the question \"Please sign me up to receive news, health information and promotions from [employer].\", Iliana responded \"No\".    Iliana states her symptoms started gradually 10-13 days ago. After her symptoms started, they improved and then got worse again.   Her symptoms consist of rhinitis, malaise, a sore throat, a cough, nasal congestion, enlarged lymph nodes, and myalgia. She is experiencing mild difficulty breathing with activities but can speak normally in full sentences.   Symptom details     Nasal secretions: The color of her mucus is clear, yellow, white, and blood-tinged.    Cough: Iliana coughs a few times an hour and her cough is more bothersome at night. Phlegm does not come into her throat when she coughs. She does not believe her cough is caused by post-nasal drip.     Sore throat: Iliana reports having moderate throat pain (4-6 on a 10 point pain scale), does not have exudate on her tonsils, and can swallow liquids. The lymph nodes in her neck are enlarged. A rash has not appeared on the skin since the sore throat started.      Iliana denies having wheezing, ear pain, fever, headache, teeth pain, chills, and facial pain or pressure. She also denies taking antibiotic medication for the symptoms, having recent facial or sinus surgery in the past 60 days, and having a sinus infection within the past year.   Precipitating events  Within the past week, Iliana has not been exposed to someone with strep throat. She has not recently been exposed to someone with influenza. Iliana has been in close contact with the following high " risk individuals: people with asthma, heart disease or diabetes.   Pertinent COVID-19 (Coronavirus) information  Iliana has not traveled internationally in the last 14 days before the start of her symptoms.   Iliana has not had close contact with a laboratory confirmed positive COVID-19 patient within 14 days of symptom onset.   Pertinent medical history  Iliana does not get yeast infections when she takes antibiotics.   Iliana does not need a return to work/school note.   Weight: 140 lbs   Iliana does not smoke or use smokeless tobacco.   Additional information as reported by the patient (free text): Have started to have Diarrhea as symptoms worsen again.   Weight: 140 lbs    MEDICATIONS: buspirone oral, zolpidem oral, ALLERGIES: NKDA  Clinician Response:  Dear Iliana,  Based on the information provided, you have acute bacterial sinusitis, also known as a sinus infection. Sinus infections are caused by bacteria or a virus and symptoms are almost always identical. The difference between the 2 types of infections is timing.  Sinus infections start as viral infections and symptoms improve on their own in about 7 days. If symptoms have not improved after 7 days or have even worsened, a bacterial infection may have developed.  Medication information  I am prescribing:     Doxycycline hyclate 100 mg oral tablet. Take 1 tablet by mouth 2 times per day for 7 days. There are no refills with this prescription.   Certain antibiotics such as Doxycycline, levofloxacin, and moxifloxacin can cause your skin to be more sensitive to the sun. Exposure to the sun when taking these antibiotics may cause skin rash, itching, redness, or a severe sunburn. Be sure to avoid prolonged or direct exposure to the sun, especially between 10 am and 3 pm, if possible. Wear protective clothing and use sunscreen of SPF 30 or higher when you are outdoors.  Yeast infections can be a common side effect of antibiotics. The most common symptom of a  yeast infection is itchiness in and around the vagina. Other signs and symptoms include burning, redness, or a thick, white vaginal discharge that looks like cottage cheese and does not have a bad smell.  Unless you are allergic to the over-the-counter medication(s) below, I recommend using:       Acetaminophen (Tylenol or store brand) oral tablet. Take 1-2 tablets by mouth every 4-6 hours to help with the discomfort.      Ibuprofen (Advil or store brand) 200 mg oral tablet. Take 1-3 tablets (200-600 mg) by mouth every 8 hours to help with the discomfort. Make sure to take the ibuprofen with food. Do not exceed 2400 mg in 24 hours.    A decongestant such as Sudafed PE or store brand.      Guaifenesin + dextromethorphan (Robitussin DM, Mucinex DM, or store brand).     Over-the-counter medications do not require a prescription. Ask the pharmacist if you have any questions.  Self care  The following tips will keep you as comfortable as possible while you recover:     Rest    Drink plenty of water and other liquids    Take a hot shower to loosen congestion    Use throat lozenges    Gargle with warm salt water (1/4 teaspoon of salt per 8 ounce glass of water)    Suck on frozen items such as popsicles or ice cubes    Drink hot tea with lemon and honey    Take a spoonful of honey to reduce your cough     When to seek care  Please be seen in a clinic or urgent care if any of the following occur:     Symptoms do not start to improve after 3 days of treatment    New symptoms develop, or symptoms become worse     It is possible to have an allergic reaction to an antibiotic even if you have not had one in the past. If you notice a new rash, significant swelling, or difficulty breathing, stop taking this medication immediately and go to a clinic or urgent care.  Call 911 or go to the emergency room if you feel that your throat is closing off, you suddenly develop a rash, you are unable to swallow fluids, you are drooling, or you  are having difficulty breathing.  COVID-19 (Coronavirus) General Information  We understand it may be concerning to be ill with symptoms that overlap with COVID-19 (Coronavirus) symptoms. Below are some helpful information on COVID-19 (Coronavirus).  How can I protect myself and others from the COVID-19 (Coronavirus)?  Because there is currently no vaccine to prevent infection, the best way to protect yourself is to avoid being exposed to this virus. The CDC recommends the following additional steps:     Wash your hands often with soap and water for at least 20 seconds, especially after blowing your nose, coughing, or sneezing; going to the bathroom; and before eating or preparing food.  Use an alcohol-based hand  that contains at least 60 percent alcohol if soap and water are not available.        Avoid touching your eyes, nose and mouth with unwashed hands.    Avoid close contact with people who are sick.    Stay home when you are sick.    Cover your cough or sneeze with a tissue, then throw the tissue in the trash.    Clean and disinfect frequently touched objects and surfaces.     You can help stop COVID-19 (Coronavirus) by knowing the signs and symptoms:     Fever    Cough    Shortness of breath     Contact your healthcare provider if   Develop symptoms   AND   Have been in close contact with a person known to have COVID-19 (Coronavirus) or live in or have recently traveled from an area with ongoing spread of COVID-19 (Coronavirus). Call ahead before you go to a doctor's office or emergency room. Tell them about your recent travel and your symptoms.   For the most up to date information, visit the CDC's website.  Steps to help prevent the spread of COVID-19 (Coronavirus) if you are sick  If you are sick with COVID-19 (Coronavirus) or suspect you are infected with the virus that causes COVID-19 (Coronavirus), follow the steps below to help prevent the disease from spreading&nbsp;to people in your home  "and community.     Stay home except to get medical care. Home isolation may be started in consultation with your healthcare clinician.    Separate yourself from other people and animals in your home.    Call ahead before visiting your doctor if you have a medical appointment.    Wear a facemask when you are around other people.    Cover your cough and sneezes.    Clean your hands often.    Avoid sharing personal household items.    Clean and disinfect frequently touched objects and surfaces everyday.    You will need to have someone drop off medications or household supplies (if needed) at your house without coming inside or in contact with you or others living in your house.    Monitor your symptoms and seek prompt medical care if your illness is worsening (e.g. Difficulty breathing).    Discontinue home isolation only in consultation with your healthcare provider.     For more detailed and up to date information on what to do if you are sick, visit this link: What to Do If You Are Sick With Coronavirus Disease 2019 (COVID-19).  Do I need to be tested for COVID-19 (Coronavirus)?     At this time, the limited number of tests available are controlled by the state and local health departments and are being reserved for more seriously ill patients, those with known exposure to confirmed patients, and those with recent travel (within 14 days) to countries with high rates of COVID-19 (Coronavirus).    Decisions on which patients receive testing will be based on the local spread of COVID-19 (Coronavirus) as well as the symptoms. Your healthcare provider will make the final decision on whether you should be tested.    In the meantime, if you have concerns that you may have been exposed, it is reasonable to practice \"social distancing.\"&nbsp; If you are ill with a cold or flu like illness, please monitor your symptoms and reach out to your healthcare provider if your symptoms worsen.    For more up to date information, " visit this link: COVID-19 (Coronavirus) Frequently Asked Questions and Answers.      Diagnosis: Acute bacterial sinusitis  Diagnosis ICD: J01.90  Prescription: doxycycline hyclate 100 mg oral tablet 14 tablet, 7 days supply. Take 1 tablet by mouth 2 times per day for 7 days. Refills: 0, Refill as needed: no, Allow substitutions: yes  Pharmacy: [facility] #[id number] - [phone] - [address] [place] [address]

## 2020-04-01 ENCOUNTER — VIRTUAL VISIT (OUTPATIENT)
Dept: FAMILY MEDICINE | Facility: OTHER | Age: 55
End: 2020-04-01

## 2020-04-02 NOTE — PROGRESS NOTES
"Date: 2020 23:06:55  Clinician: Lorie Lujan  Clinician NPI: [id number]  Patient: Iliana Mcfarlane  Patient : 1965  Patient Address: [address] [place] [address] [address] [place] [id number]  Patient Phone: [phone]  Visit Protocol: URI  Patient Summary:  Iliana is a 55 year old ( : 1965 ) female who initiated a Visit for cold, sinus infection, or influenza. When asked the question \"Please sign me up to receive news, health information and promotions from [employer].\", Iliana responded \"Yes\".    Iliana states her symptoms started gradually 1 month or more ago. After her symptoms started, they improved and then got worse again.   Her symptoms consist of facial pain or pressure, a sore throat, a cough, nasal congestion, malaise, enlarged lymph nodes, and rhinitis. She is experiencing difficulty breathing due to nasal congestion but she is not short of breath.   Symptom details     Nasal secretions: The color of her mucus is clear.    Cough: Iliana coughs a few times an hour and her cough is more bothersome at night. Phlegm comes into her throat when she coughs. She believes her cough is caused by post-nasal drip. The color of the phlegm is clear.     Sore throat: Iliana reports having mild throat pain (1-3 on a 10 point pain scale), does not have exudate on her tonsils, and can swallow liquids. The lymph nodes in her neck are enlarged. A rash has not appeared on the skin since the sore throat started.     Facial pain or pressure: She has not had the facial pain or pressure for 12 weeks or more. The facial pain or pressure does not feel worse when bending or leaning forward.      Iliana denies having teeth pain, headache, fever, myalgias, chills, wheezing, and ear pain. She also denies having a sinus infection within the past year and having recent facial or sinus surgery in the past 60 days.   Precipitating events  Within the past week, Iliana has not been exposed to someone with strep throat. She has not " "recently been exposed to someone with influenza. Iliana has not been in close contact with any high risk individuals.   Pertinent COVID-19 (Coronavirus) information  Iliana has not traveled internationally or to the areas where COVID-19 (Coronavirus) is widespread, including cruise ship travel in the last 14 days before the start of her symptoms.   Iliana has not had a close contact with a laboratory-confirmed COVID-19 patient within 14 days of symptom onset. She also has not had a close contact with a suspected COVID-19 patient within 14 days of symptom onset.   Iliana is not a healthcare worker or a  and does not work in a healthcare facility. She does not live with a healthcare worker.   Pertinent medical history  Iliana has taken an antibiotic medication in the past month. Antibiotic details as reported by the patient (free text): Doxycycline hyclate 100 mg for bacterial sinusitis   Iliana does not get yeast infections when she takes antibiotics.   Iliana does not need a return to work/school note.   Weight: 140 lbs   Iliana does not smoke or use smokeless tobacco.   Additional information as reported by the patient (free text): My symptoms improved after antibiotics but never went away completely. in the past few days getting worse again. I have also been doing Neti pot 2x/day with saline (+clindamacin 1 capsule in lg bottle) as prescribed in past by allergist. Cough is particularly troublesome and getting \"deeper\". Also tonight head cold symptoms have returned.   Weight: 140 lbs    MEDICATIONS: azelastine ophthalmic (eye), Xiidra ophthalmic (eye), omeprazole oral, buspirone oral, zolpidem oral, ALLERGIES: NKDA  Clinician Response:  Dear Iliana,  Based on the information provided, you have acute bacterial sinusitis, also known as a sinus infection. Sinus infections are caused by bacteria or a virus and symptoms are almost always identical. The difference between the 2 types of infections is " timing.  Sinus infections start as viral infections and symptoms improve on their own in about 7 days. If symptoms have not improved after 7 days or have even worsened, a bacterial infection may have developed.  Medication information  I am prescribing:       Fluticasone 50 mcg/actuation nasal spray. Inhale 2 sprays in each nostril 1 time per day; after 1 week, may adjust to 1 - 2 sprays in each nostril 1 time per day. This medication takes several days to start working, so keep taking it even if it doesn't help right away. There are no refills with this prescription.      Amoxicillin-pot clavulanate 875-125 mg oral tablet. Take 1 tablet by mouth every 12 hours for 10 days. There are no refills with this prescription.      Benzonatate (Tessalon Perles) 100 mg oral capsule. Take 1-2 capsules by mouth 3 times per day as needed for your cough. There are no refills with this prescription.     Yeast infections can be a common side effect of antibiotics. The most common symptom of a yeast infection is itchiness in and around the vagina. Other signs and symptoms include burning, redness, or a thick, white vaginal discharge that looks like cottage cheese and does not have a bad smell.  Unless you are allergic to the over-the-counter medication(s) below, I recommend using:       Acetaminophen (Tylenol or store brand) oral tablet. Take 1-2 tablets by mouth every 4-6 hours to help with the discomfort.    A decongestant such as Sudafed PE or store brand.      Guaifenesin + dextromethorphan (Robitussin DM, Mucinex DM, or store brand).      Saline nasal spray or drops(Ocean or store brand). Use 1-2 drops or sprays in each nostril as needed for congestion.     Over-the-counter medications do not require a prescription. Ask the pharmacist if you have any questions.  Self care  Steps you can take to be as comfortable as possible:     Rest.    Drink plenty of fluids.    Take a warm shower to loosen congestion    Use a cool-mist  humidifier.    Use throat lozenges.    Suck on frozen items such as popsicles.    Drink hot tea with lemon and honey.    Gargle with warm salt water (1/4 teaspoon of salt per 8 ounce glass of water).    Take a spoonful of honey to reduce your cough.     When to seek care  Please be seen in a clinic or urgent care if any of the following occur:     Symptoms do not start to improve after 3 days of treatment    New symptoms develop, or symptoms become worse     Please be seen in a clinic or urgent care if new symptoms develop, or symptoms become worse.  It is possible to have an allergic reaction to an antibiotic even if you have not had one in the past. If you notice a new rash, significant swelling, or difficulty breathing, stop taking this medication immediately and go to a clinic or urgent care.  Call 911 or go to the emergency room if you feel that your throat is closing off, you suddenly develop a rash, you are unable to swallow fluids, you are drooling, or you are having difficulty breathing.  Additional treatment plan      Based on the information you have provided, you do have symptoms that are consistent with Coronavirus (COVID-19).  The coronavirus causes mild to severe respiratory illness with the most common symptoms including fever, cough and difficulty breathing. Unfortunately, many viruses cause similar symptoms and it can be difficult to distinguish between viruses, especially in mild cases, so we are presuming that anyone with cough or fever has coronavirus at this time.  Coronavirus/COVID-19 has reached the point of community spread in [place], meaning that we are finding the virus in people with no known exposure risk for rachid the virus. Given the increasing commonness of coronavirus in the community we are no longer testing patients who are not critically ill.  If you are a health care worker, you should refer to your employee health office for instructions about testing and returning to  work.  For everyone else who has cough or fever, you should assume you are infected with coronavirus. Since you will not be tested but have symptoms that may be consistent with coronavirus, the CDC recommends you stay in self-isolation until these three things have happened:    You have had no fever for at least 72 hours (that is three full days of no fever without the use of medicine that reduces fevers)    AND   Other symptoms have improved (for example, when your cough or shortness of breath have improved)   AND   At least 7 days have passed since your symptoms first appeared.   How to Isolate:   Isolate yourself at home.  Do Not allow any visitors  Do Not go to work or school  Do Not go to [facility],  centers, shopping, or other public places.  Do Not shake hands.  Avoid close contact with others (hugging, kissing).   Protect Others:   Cover Your Mouth and Nose with a mask, disposable tissue or wash cloth to avoid spreading germs to others.  Wash your hands and face frequently with soap and water.   We know it can be scary to hear that you might have COVID-19. Our team can help track your symptoms and make sure you are doing ok over the next two weeks using a program called [employer] to keep in touch. When you receive an email from [employer], please consider enrolling in our monitoring program. There is no cost to you for monitoring. Here is a URL where you can learn more: [email or web address]  Managing Symptoms:   At this time, we primarily recommend Tylenol (Acetaminophen) for fever or pain. If you have liver or kidney problems, contact your primary care provider for instructions on use of tylenol. Adults can take 650 mg (two 325 mg pills) by mouth every 4-6 hours as needed OR 1,000 mg (two 500 mg pills) every 8 hours as needed. MAXIMUM DAILY DOSE: 3,000mg. For children, refer to dosing on bottle based on age or weight.   If you develop significant shortness of breath that prevents  you from doing normal activities, please call 911 or proceed to the nearest emergency room and alert them immediately that you have been in self-isolation for possible coronavirus.  If you have a higher risk medical condition such as cancer, heart failure, end stage renal disease on dialysis or have a transplant, please reach out to your specialist's clinic to advise them of your [facility] visit should you not improve within the next two days.   For more information about COVID19 and options for caring for yourself at home, please visit the CDC website at https://www.cdc.gov/coronavirus/2019-ncov/about/steps-when-sick.htmlFor more options for care at [facility] [place], please visit our website at [email or web address]    Diagnosis: Cough  Diagnosis ICD: R05  Additional Clinician Notes: We will try another round of antibiotics but if no improvement after 3 full days of treatment let us know through the [facility]. There is a link above for you to sign up. You will be able to be in daily contact with a healthcare provider through the [facility].  Prescription: benzonatate (Tessalon Perles) 100 mg oral capsule 30 capsule, 5 days supply. Take 1-2 capsules by mouth 3 times per day as needed. Refills: 0, Refill as needed: no, Allow substitutions: yes  Prescription: amoxicillin-pot clavulanate 875-125 mg oral tablet 20 tablet, 10 days supply. Take 1 tablet by mouth every 12 hours for 10 days. Refills: 0, Refill as needed: no, Allow substitutions: yes  Prescription: fluticasone 50 mcg/actuation nasal spray,suspension 1 120 spray aerosol with adapter (grams), 30 days supply. Inhale 2 sprays in each nostril 1 time per day; after 1 week, may adjust to 1 - 2 sprays in each nostril 1 time per day.. Refills: 0, Refill as needed: no, Allow substitutions: yes  Pharmacy: [facility] #[id number] - [phone] - [address] [place] [address]

## 2020-09-17 ENCOUNTER — VIRTUAL VISIT (OUTPATIENT)
Dept: FAMILY MEDICINE | Facility: CLINIC | Age: 55
End: 2020-09-17
Payer: COMMERCIAL

## 2020-09-17 DIAGNOSIS — F41.9 ANXIETY: ICD-10-CM

## 2020-09-17 DIAGNOSIS — F51.01 PRIMARY INSOMNIA: ICD-10-CM

## 2020-09-17 PROCEDURE — 99214 OFFICE O/P EST MOD 30 MIN: CPT | Mod: 95 | Performed by: NURSE PRACTITIONER

## 2020-09-17 RX ORDER — BUSPIRONE HYDROCHLORIDE 15 MG/1
TABLET ORAL
Qty: 360 TABLET | Refills: 0
Start: 2020-09-17 | End: 2021-02-09

## 2020-09-17 RX ORDER — TRAZODONE HYDROCHLORIDE 50 MG/1
100 TABLET, FILM COATED ORAL
Qty: 180 TABLET | Refills: 2 | Status: SHIPPED | OUTPATIENT
Start: 2020-09-17 | End: 2021-02-09

## 2020-09-17 RX ORDER — ZOLPIDEM TARTRATE 10 MG/1
TABLET ORAL
Qty: 90 TABLET | Refills: 0 | Status: CANCELLED | OUTPATIENT
Start: 2020-09-17

## 2020-09-17 RX ORDER — ZOLPIDEM TARTRATE 12.5 MG/1
12.5 TABLET, FILM COATED, EXTENDED RELEASE ORAL
Qty: 90 TABLET | Refills: 1 | Status: SHIPPED | OUTPATIENT
Start: 2020-09-17 | End: 2021-02-09

## 2020-09-17 ASSESSMENT — ANXIETY QUESTIONNAIRES
3. WORRYING TOO MUCH ABOUT DIFFERENT THINGS: SEVERAL DAYS
6. BECOMING EASILY ANNOYED OR IRRITABLE: SEVERAL DAYS
7. FEELING AFRAID AS IF SOMETHING AWFUL MIGHT HAPPEN: NOT AT ALL
5. BEING SO RESTLESS THAT IT IS HARD TO SIT STILL: NOT AT ALL
1. FEELING NERVOUS, ANXIOUS, OR ON EDGE: NEARLY EVERY DAY
GAD7 TOTAL SCORE: 9
2. NOT BEING ABLE TO STOP OR CONTROL WORRYING: MORE THAN HALF THE DAYS

## 2020-09-17 ASSESSMENT — PATIENT HEALTH QUESTIONNAIRE - PHQ9
5. POOR APPETITE OR OVEREATING: MORE THAN HALF THE DAYS
SUM OF ALL RESPONSES TO PHQ QUESTIONS 1-9: 14
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
## 2020-09-18 ASSESSMENT — ANXIETY QUESTIONNAIRES: GAD7 TOTAL SCORE: 9

## 2020-11-07 ENCOUNTER — HEALTH MAINTENANCE LETTER (OUTPATIENT)
Age: 55
End: 2020-11-07

## 2020-11-10 ENCOUNTER — IMMUNIZATION (OUTPATIENT)
Dept: NURSING | Facility: CLINIC | Age: 55
End: 2020-11-10
Payer: COMMERCIAL

## 2020-11-10 PROCEDURE — 90682 RIV4 VACC RECOMBINANT DNA IM: CPT

## 2020-11-10 PROCEDURE — 90471 IMMUNIZATION ADMIN: CPT

## 2021-01-15 ENCOUNTER — HEALTH MAINTENANCE LETTER (OUTPATIENT)
Age: 56
End: 2021-01-15

## 2021-02-09 ENCOUNTER — OFFICE VISIT (OUTPATIENT)
Dept: FAMILY MEDICINE | Facility: CLINIC | Age: 56
End: 2021-02-09
Payer: COMMERCIAL

## 2021-02-09 VITALS
OXYGEN SATURATION: 93 % | HEIGHT: 63 IN | RESPIRATION RATE: 15 BRPM | HEART RATE: 88 BPM | DIASTOLIC BLOOD PRESSURE: 90 MMHG | WEIGHT: 150.4 LBS | SYSTOLIC BLOOD PRESSURE: 127 MMHG | BODY MASS INDEX: 26.65 KG/M2 | TEMPERATURE: 94.5 F

## 2021-02-09 DIAGNOSIS — Z00.00 ROUTINE GENERAL MEDICAL EXAMINATION AT A HEALTH CARE FACILITY: Primary | ICD-10-CM

## 2021-02-09 DIAGNOSIS — R92.30 DENSE BREAST TISSUE ON MAMMOGRAM: ICD-10-CM

## 2021-02-09 DIAGNOSIS — F41.9 ANXIETY: ICD-10-CM

## 2021-02-09 DIAGNOSIS — F51.01 PRIMARY INSOMNIA: ICD-10-CM

## 2021-02-09 LAB
CHOLEST SERPL-MCNC: 304 MG/DL
GLUCOSE SERPL-MCNC: 101 MG/DL (ref 70–99)
HDLC SERPL-MCNC: 67 MG/DL
LDLC SERPL CALC-MCNC: 202 MG/DL
NONHDLC SERPL-MCNC: 237 MG/DL
TRIGL SERPL-MCNC: 173 MG/DL

## 2021-02-09 PROCEDURE — 82947 ASSAY GLUCOSE BLOOD QUANT: CPT | Performed by: NURSE PRACTITIONER

## 2021-02-09 PROCEDURE — 99396 PREV VISIT EST AGE 40-64: CPT | Performed by: NURSE PRACTITIONER

## 2021-02-09 PROCEDURE — 99213 OFFICE O/P EST LOW 20 MIN: CPT | Mod: 25 | Performed by: NURSE PRACTITIONER

## 2021-02-09 PROCEDURE — 36415 COLL VENOUS BLD VENIPUNCTURE: CPT | Performed by: NURSE PRACTITIONER

## 2021-02-09 PROCEDURE — 80061 LIPID PANEL: CPT | Performed by: NURSE PRACTITIONER

## 2021-02-09 RX ORDER — ZOLPIDEM TARTRATE 12.5 MG/1
12.5 TABLET, FILM COATED, EXTENDED RELEASE ORAL
Qty: 90 TABLET | Refills: 1 | Status: SHIPPED | OUTPATIENT
Start: 2021-02-09 | End: 2021-07-13

## 2021-02-09 RX ORDER — BUSPIRONE HYDROCHLORIDE 15 MG/1
TABLET ORAL
Qty: 360 TABLET | Status: SHIPPED | OUTPATIENT
Start: 2021-02-09 | End: 2021-12-07

## 2021-02-09 RX ORDER — TRAZODONE HYDROCHLORIDE 50 MG/1
100 TABLET, FILM COATED ORAL
Qty: 180 TABLET | Refills: 3 | Status: SHIPPED | OUTPATIENT
Start: 2021-02-09 | End: 2022-03-09

## 2021-02-09 ASSESSMENT — ENCOUNTER SYMPTOMS
EYE PAIN: 0
HEARTBURN: 0
DIARRHEA: 0
COUGH: 1
FEVER: 0
NAUSEA: 0
FREQUENCY: 0
JOINT SWELLING: 0
CONSTIPATION: 0
HEMATOCHEZIA: 0
MYALGIAS: 0
NERVOUS/ANXIOUS: 0
WEAKNESS: 0
PARESTHESIAS: 0
HEMATURIA: 0
BREAST MASS: 0
SHORTNESS OF BREATH: 0
ABDOMINAL PAIN: 0
SORE THROAT: 0
DIZZINESS: 0
CHILLS: 0
PALPITATIONS: 0
ARTHRALGIAS: 0
HEADACHES: 0
DYSURIA: 0

## 2021-02-09 ASSESSMENT — ANXIETY QUESTIONNAIRES
GAD7 TOTAL SCORE: 1
IF YOU CHECKED OFF ANY PROBLEMS ON THIS QUESTIONNAIRE, HOW DIFFICULT HAVE THESE PROBLEMS MADE IT FOR YOU TO DO YOUR WORK, TAKE CARE OF THINGS AT HOME, OR GET ALONG WITH OTHER PEOPLE: NOT DIFFICULT AT ALL
6. BECOMING EASILY ANNOYED OR IRRITABLE: NOT AT ALL
5. BEING SO RESTLESS THAT IT IS HARD TO SIT STILL: NOT AT ALL
2. NOT BEING ABLE TO STOP OR CONTROL WORRYING: NOT AT ALL
7. FEELING AFRAID AS IF SOMETHING AWFUL MIGHT HAPPEN: NOT AT ALL
1. FEELING NERVOUS, ANXIOUS, OR ON EDGE: SEVERAL DAYS
3. WORRYING TOO MUCH ABOUT DIFFERENT THINGS: NOT AT ALL

## 2021-02-09 ASSESSMENT — MIFFLIN-ST. JEOR: SCORE: 1257.46

## 2021-02-09 ASSESSMENT — PATIENT HEALTH QUESTIONNAIRE - PHQ9
SUM OF ALL RESPONSES TO PHQ QUESTIONS 1-9: 6
5. POOR APPETITE OR OVEREATING: NOT AT ALL
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
## 2021-02-10 ASSESSMENT — ANXIETY QUESTIONNAIRES: GAD7 TOTAL SCORE: 1

## 2021-02-19 ENCOUNTER — HOSPITAL ENCOUNTER (OUTPATIENT)
Dept: MAMMOGRAPHY | Facility: CLINIC | Age: 56
Discharge: HOME | End: 2021-02-19
Attending: NURSE PRACTITIONER | Admitting: NURSE PRACTITIONER
Payer: COMMERCIAL

## 2021-02-19 DIAGNOSIS — R92.30 DENSE BREAST TISSUE ON MAMMOGRAM: ICD-10-CM

## 2021-02-19 PROCEDURE — 77063 BREAST TOMOSYNTHESIS BI: CPT

## 2021-07-31 ENCOUNTER — MYC MEDICAL ADVICE (OUTPATIENT)
Dept: FAMILY MEDICINE | Facility: CLINIC | Age: 56
End: 2021-07-31

## 2021-09-05 ENCOUNTER — HEALTH MAINTENANCE LETTER (OUTPATIENT)
Age: 56
End: 2021-09-05

## 2021-09-15 ENCOUNTER — OFFICE VISIT (OUTPATIENT)
Dept: FAMILY MEDICINE | Facility: CLINIC | Age: 56
End: 2021-09-15
Payer: COMMERCIAL

## 2021-09-15 VITALS
RESPIRATION RATE: 16 BRPM | DIASTOLIC BLOOD PRESSURE: 78 MMHG | WEIGHT: 156 LBS | TEMPERATURE: 98.1 F | OXYGEN SATURATION: 96 % | BODY MASS INDEX: 27.3 KG/M2 | SYSTOLIC BLOOD PRESSURE: 116 MMHG | HEART RATE: 90 BPM

## 2021-09-15 DIAGNOSIS — J45.20 MILD INTERMITTENT ASTHMA WITHOUT COMPLICATION: ICD-10-CM

## 2021-09-15 DIAGNOSIS — E78.5 HYPERLIPIDEMIA, UNSPECIFIED HYPERLIPIDEMIA TYPE: ICD-10-CM

## 2021-09-15 DIAGNOSIS — H91.90 DECREASED HEARING, UNSPECIFIED LATERALITY: ICD-10-CM

## 2021-09-15 DIAGNOSIS — Z23 NEED FOR PROPHYLACTIC VACCINATION AND INOCULATION AGAINST INFLUENZA: ICD-10-CM

## 2021-09-15 DIAGNOSIS — M54.2 NECK PAIN: ICD-10-CM

## 2021-09-15 DIAGNOSIS — D17.30 LIPOMA OF SKIN AND SUBCUTANEOUS TISSUE: ICD-10-CM

## 2021-09-15 DIAGNOSIS — F41.9 ANXIETY: Primary | ICD-10-CM

## 2021-09-15 PROCEDURE — 90471 IMMUNIZATION ADMIN: CPT | Performed by: NURSE PRACTITIONER

## 2021-09-15 PROCEDURE — 99214 OFFICE O/P EST MOD 30 MIN: CPT | Mod: 25 | Performed by: NURSE PRACTITIONER

## 2021-09-15 PROCEDURE — 90682 RIV4 VACC RECOMBINANT DNA IM: CPT | Performed by: NURSE PRACTITIONER

## 2021-09-15 RX ORDER — PROPRANOLOL HYDROCHLORIDE 20 MG/1
20-60 TABLET ORAL 3 TIMES DAILY PRN
Qty: 270 TABLET | Refills: 3 | Status: SHIPPED | OUTPATIENT
Start: 2021-09-15 | End: 2021-10-26

## 2021-09-15 RX ORDER — ALBUTEROL SULFATE 90 UG/1
2 AEROSOL, METERED RESPIRATORY (INHALATION) EVERY 6 HOURS
Qty: 18 G | Refills: 3 | Status: SHIPPED | OUTPATIENT
Start: 2021-09-15 | End: 2022-11-07

## 2021-09-15 NOTE — LETTER
September 15, 2021      Iliana Mcfarlane  [address] [id number]        To Whom It May Concern,     Therapeutic massage is medically necessary for neck and back pain and tension headaches.       Sincerely,        Janey Aguirre, DNP, CNP
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
## 2021-09-16 ASSESSMENT — ASTHMA QUESTIONNAIRES: ACT_TOTALSCORE: 23

## 2021-09-27 ENCOUNTER — OFFICE VISIT (OUTPATIENT)
Dept: AUDIOLOGY | Facility: CLINIC | Age: 56
End: 2021-09-27
Attending: NURSE PRACTITIONER
Payer: COMMERCIAL

## 2021-09-27 DIAGNOSIS — Z77.122 HISTORY OF EXPOSURE TO NOISE: Primary | ICD-10-CM

## 2021-09-27 DIAGNOSIS — H91.90 DECREASED HEARING, UNSPECIFIED LATERALITY: ICD-10-CM

## 2021-09-27 PROCEDURE — 92550 TYMPANOMETRY & REFLEX THRESH: CPT | Performed by: AUDIOLOGIST

## 2021-09-27 PROCEDURE — 92557 COMPREHENSIVE HEARING TEST: CPT | Performed by: AUDIOLOGIST
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
## 2021-10-08 ENCOUNTER — E-VISIT (OUTPATIENT)
Dept: FAMILY MEDICINE | Facility: CLINIC | Age: 56
End: 2021-10-08
Payer: COMMERCIAL

## 2021-10-08 DIAGNOSIS — B96.89 ACUTE BACTERIAL SINUSITIS: Primary | ICD-10-CM

## 2021-10-08 DIAGNOSIS — J01.90 ACUTE BACTERIAL SINUSITIS: Primary | ICD-10-CM

## 2021-10-08 PROCEDURE — 99421 OL DIG E/M SVC 5-10 MIN: CPT | Performed by: NURSE PRACTITIONER

## 2021-10-08 NOTE — PATIENT INSTRUCTIONS
Dear Iliana Mcfarlane    After reviewing your responses, I've been able to diagnose you with?a sinus infection caused by bacteria.?     Based on your responses and diagnosis, I have prescribed augmentin to treat your symptoms. I have sent this to your pharmacy.?     It is also important to stay well hydrated, get lots of rest and take over-the-counter decongestants,?tylenol?or ibuprofen if you?are able to?take those medications per your primary care provider to help relieve discomfort.?     It is important that you take?all of?your prescribed medication even if your symptoms are improving after a few doses.? Taking?all of?your medicine helps prevent the symptoms from returning.?     If your symptoms worsen, you develop severe headache, vomiting, high fever (>102), or are not improving in 7 days, please contact your primary care provider for an appointment or visit any of our convenient Walk-in Care or Urgent Care Centers to be seen which can be found on our website?here.?     Thanks again for choosing?us?as your health care partner,?   ?  Eryn Santana, CNP?     Sinusitis (Antibiotic Treatment)    The sinuses are air-filled spaces within the bones of the face. They connect to the inside of the nose. Sinusitis is an inflammation of the tissue that lines the sinuses. Sinusitis can occur during a cold. It can also happen due to allergies to pollens and other particles in the air. Sinusitis can cause symptoms of sinus congestion and a feeling of fullness. A sinus infection causes fever, headache, and facial pain. There is often green or yellow fluid draining from the nose or into the back of the throat (post-nasal drip). You have been given antibiotics to treat this condition.   Home care    Take the full course of antibiotics as instructed. Don't stop taking them, even when you feel better.    Drink plenty of water, hot tea, and other liquids as directed by the healthcare provider. This may help thin nasal  mucus. It also may help your sinuses drain fluids.    Heat may help soothe painful areas of your face. Use a towel soaked in hot water. Or,  the shower and direct the warm spray onto your face. Using a vaporizer along with a menthol rub at night may also help soothe symptoms.     An expectorant with guaifenesin may help thin nasal mucus and help your sinuses drain fluids. Talk with your provider or pharmacists before taking an over-the-counter (OTC) medicine if you have any questions about it or its side effects..    You can use an OTC decongestant, unless a similar medicine was prescribed to you. Nasal sprays work the fastest. Use one that contains phenylephrine or oxymetazoline. First blow your nose gently. Then use the spray. Don't use these medicines more often than directed on the label. If you do, your symptoms may get worse. You may also take pills that contain pseudoephedrine. Don t use products that combine multiple medicines. This is because side effects may be increased. Read labels. You can also ask the pharmacist for help. (People with high blood pressure should not use decongestants. They can raise blood pressure.) Talk with your provider or pharmacist if you have any questions about the medicine..    OTC antihistamines may help if allergies contributed to your sinusitis. Talk with your provider or pharmacist if you have any questions about the medicine..    Don't use nasal rinses or irrigation during an acute sinus infection, unless your healthcare provider tells you to. Rinsing may spread the infection to other areas in your sinuses.    Use acetaminophen or ibuprofen to control pain, unless another pain medicine was prescribed to you. If you have chronic liver or kidney disease or ever had a stomach ulcer, talk with your healthcare provider before using these medicines. Never give aspirin to anyone under age 18 who is ill with a fever. It may cause severe liver damage.    Don't smoke. This  can make symptoms worse.    Follow-up care  Follow up with your healthcare provider, or as advised.   When to seek medical advice  Call your healthcare provider if any of these occur:     Facial pain or headache that gets worse    Stiff neck    Unusual drowsiness or confusion    Swelling of your forehead or eyelids    Symptoms don't go away in 10 days    Vision problems, such as blurred or double vision    Fever of 100.4 F (38 C) or higher, or as directed by your healthcare provider  Call 911  Call 911 if any of these occur:     Seizure    Trouble breathing    Feeling dizzy or faint    Fingernails, skin or lips look blue, purple , or gray  Prevention  Here are steps you can take to help prevent an infection:     Keep good hand washing habits.    Don t have close contact with people who have sore throats, colds, or other upper respiratory infections.    Don t smoke, and stay away from secondhand smoke.    Stay up to date with of your vaccines.  [place] last reviewed this educational content on 12/1/2019 2000-2021 [employer]. All rights reserved. This information is not intended as a substitute for professional medical care. Always follow your healthcare professional's instructions.

## 2021-10-26 ENCOUNTER — OFFICE VISIT (OUTPATIENT)
Dept: FAMILY MEDICINE | Facility: CLINIC | Age: 56
End: 2021-10-26
Payer: COMMERCIAL

## 2021-10-26 VITALS
WEIGHT: 156 LBS | TEMPERATURE: 97.9 F | BODY MASS INDEX: 27.3 KG/M2 | HEART RATE: 82 BPM | SYSTOLIC BLOOD PRESSURE: 122 MMHG | DIASTOLIC BLOOD PRESSURE: 84 MMHG | OXYGEN SATURATION: 96 %

## 2021-10-26 DIAGNOSIS — F33.1 MODERATE RECURRENT MAJOR DEPRESSION (H): ICD-10-CM

## 2021-10-26 DIAGNOSIS — F41.1 GAD (GENERALIZED ANXIETY DISORDER): Primary | ICD-10-CM

## 2021-10-26 PROCEDURE — 99214 OFFICE O/P EST MOD 30 MIN: CPT | Performed by: NURSE PRACTITIONER

## 2021-10-26 RX ORDER — PAROXETINE 20 MG/1
TABLET, FILM COATED ORAL
Qty: 30 TABLET | Refills: 1 | Status: SHIPPED | OUTPATIENT
Start: 2021-10-26 | End: 2021-11-09

## 2021-10-26 ASSESSMENT — ANXIETY QUESTIONNAIRES
7. FEELING AFRAID AS IF SOMETHING AWFUL MIGHT HAPPEN: NEARLY EVERY DAY
2. NOT BEING ABLE TO STOP OR CONTROL WORRYING: NEARLY EVERY DAY
3. WORRYING TOO MUCH ABOUT DIFFERENT THINGS: NEARLY EVERY DAY
IF YOU CHECKED OFF ANY PROBLEMS ON THIS QUESTIONNAIRE, HOW DIFFICULT HAVE THESE PROBLEMS MADE IT FOR YOU TO DO YOUR WORK, TAKE CARE OF THINGS AT HOME, OR GET ALONG WITH OTHER PEOPLE: EXTREMELY DIFFICULT
5. BEING SO RESTLESS THAT IT IS HARD TO SIT STILL: NOT AT ALL
1. FEELING NERVOUS, ANXIOUS, OR ON EDGE: NEARLY EVERY DAY
6. BECOMING EASILY ANNOYED OR IRRITABLE: NEARLY EVERY DAY
GAD7 TOTAL SCORE: 18

## 2021-10-26 ASSESSMENT — PATIENT HEALTH QUESTIONNAIRE - PHQ9
SUM OF ALL RESPONSES TO PHQ QUESTIONS 1-9: 19
5. POOR APPETITE OR OVEREATING: NEARLY EVERY DAY

## 2021-10-26 NOTE — PROGRESS NOTES
Assessment & Plan     KANIKA (generalized anxiety disorder)  Worsening  Will start Paxil.  Discussed the use and indication of this medication as well as potential side effects.   Referral to Collaborative Psychiatry given.  She will continue to see her therapist.  Follow up in one month.   - PARoxetine (PAXIL) 20 MG tablet; Take 1/2 tablet daily for one week, then increase to one tablet daily  - MENTAL HEALTH REFERRAL  - Adult; Psychiatry; Psychiatry; Collaborative Care Psychiatry Service/Bridge to Long-Term Psychiatry as indicated ([phone]); Yes; Chronic Mental Health without improvement; Yes; We will contact you to schedule the a...; Future    Moderate recurrent major depression (H)  Worsening.  See above.   - PARoxetine (PAXIL) 20 MG tablet; Take 1/2 tablet daily for one week, then increase to one tablet daily  - MENTAL HEALTH REFERRAL  - Adult; Psychiatry; Psychiatry; Collaborative Care Psychiatry Service/Bridge to Long-Term Psychiatry as indicated ([phone]); Yes; Chronic Mental Health without improvement; Yes; We will contact you to schedule the a...; Future                 Return in about 4 weeks (around 11/23/2021).    Janey Aguirre NP  [facility]   Prachi is a 56 year old who presents for the following health issues     HPI     Depression and Anxiety Follow-Up  Discuss Medications     How are you doing with your depression since your last visit? Worsened     How are you doing with your anxiety since your last visit?  Worsened     Are you having other symptoms that might be associated with depression or anxiety? No    Have you had a significant life event? continued stress from co-parent      Do you have any concerns with your use of alcohol or other drugs? No     Social History     Tobacco Use     Smoking status: Never Smoker     Smokeless tobacco: Never Used   Substance Use Topics     Alcohol use: Yes     Alcohol/week: 0.0 standard drinks      Comment: 1 a day      Drug use: No     PHQ 9/17/2020 2/9/2021 10/26/2021   PHQ-9 Total Score 14 6 19   Q9: Thoughts of better off dead/self-harm past 2 weeks Not at all Not at all Not at all     KANIKA-7 SCORE 9/17/2020 2/9/2021 10/26/2021   Total Score - - -   Total Score 9 1 18         Suicide Assessment Five-step Evaluation and Treatment (SAFE-T)      How many servings of fruits and vegetables do you eat daily? 0-1 in the past few weeks.     On average, how many sweetened beverages do you drink each day (Examples: soda, juice, sweet tea, etc.  Do NOT count diet or artificially sweetened beverages)?   0    How many days per week do you exercise enough to make your heart beat faster? Normally Yes. Hard the last couple weeks     How many minutes a day do you exercise enough to make your heart beat faster? N/A    How many days per week do you miss taking your medication? 0    Notes from last visit in September:  She continues to have difficulty sleeping.    Her goal is to taper off of her medications.    Stress level has been higher lately, legal issues with her ex-.  She has started to have panic attacks during the day.  Her anxiety level is higher.  She is exercising, doing yoga.  She is currently taking Buspar 22.5 mg TID.  She has been on Celexa and then Lexapro after, but she didn't notice much improvement.    Over the past month, both her anxiety and depression are worse.  It is difficult to get out of bed, no motivation.    Her anxiety is the most problematic symptom for her.  She is seeing a therapist.    Propranolol did not help.            Review of Systems         Objective    /84   Pulse 82   Temp 97.9  F (36.6  C) (Oral)   Wt 70.8 kg (156 lb)   LMP  (LMP Unknown)   SpO2 96%   BMI 27.30 kg/m    Body mass index is 27.3 kg/m .  Physical Exam   GENERAL: healthy, alert and no distress  PSYCH: mentation appears normal, affect flattened; PHQ-9 score of 17; KANIKA-7 score of 18

## 2021-10-27 ASSESSMENT — ANXIETY QUESTIONNAIRES: GAD7 TOTAL SCORE: 18

## 2021-11-08 ASSESSMENT — ANXIETY QUESTIONNAIRES
7. FEELING AFRAID AS IF SOMETHING AWFUL MIGHT HAPPEN: NOT AT ALL
3. WORRYING TOO MUCH ABOUT DIFFERENT THINGS: SEVERAL DAYS
6. BECOMING EASILY ANNOYED OR IRRITABLE: MORE THAN HALF THE DAYS
GAD7 TOTAL SCORE: 9
2. NOT BEING ABLE TO STOP OR CONTROL WORRYING: SEVERAL DAYS
8. IF YOU CHECKED OFF ANY PROBLEMS, HOW DIFFICULT HAVE THESE MADE IT FOR YOU TO DO YOUR WORK, TAKE CARE OF THINGS AT HOME, OR GET ALONG WITH OTHER PEOPLE?: EXTREMELY DIFFICULT
GAD7 TOTAL SCORE: 9
GAD7 TOTAL SCORE: 9
1. FEELING NERVOUS, ANXIOUS, OR ON EDGE: MORE THAN HALF THE DAYS
5. BEING SO RESTLESS THAT IT IS HARD TO SIT STILL: NOT AT ALL
7. FEELING AFRAID AS IF SOMETHING AWFUL MIGHT HAPPEN: NOT AT ALL
4. TROUBLE RELAXING: NEARLY EVERY DAY

## 2021-11-08 ASSESSMENT — PATIENT HEALTH QUESTIONNAIRE - PHQ9
SUM OF ALL RESPONSES TO PHQ QUESTIONS 1-9: 14
10. IF YOU CHECKED OFF ANY PROBLEMS, HOW DIFFICULT HAVE THESE PROBLEMS MADE IT FOR YOU TO DO YOUR WORK, TAKE CARE OF THINGS AT HOME, OR GET ALONG WITH OTHER PEOPLE: EXTREMELY DIFFICULT
SUM OF ALL RESPONSES TO PHQ QUESTIONS 1-9: 14

## 2021-11-09 ENCOUNTER — VIRTUAL VISIT (OUTPATIENT)
Dept: PSYCHIATRY | Facility: CLINIC | Age: 56
End: 2021-11-09
Attending: NURSE PRACTITIONER
Payer: COMMERCIAL

## 2021-11-09 DIAGNOSIS — F41.1 GAD (GENERALIZED ANXIETY DISORDER): Primary | ICD-10-CM

## 2021-11-09 DIAGNOSIS — F33.1 MODERATE RECURRENT MAJOR DEPRESSION (H): ICD-10-CM

## 2021-11-09 DIAGNOSIS — F43.10 PTSD (POST-TRAUMATIC STRESS DISORDER): ICD-10-CM

## 2021-11-09 PROCEDURE — 99205 OFFICE O/P NEW HI 60 MIN: CPT | Mod: GT | Performed by: STUDENT IN AN ORGANIZED HEALTH CARE EDUCATION/TRAINING PROGRAM

## 2021-11-09 RX ORDER — PAROXETINE 30 MG/1
30 TABLET, FILM COATED ORAL AT BEDTIME
Qty: 30 TABLET | Refills: 1 | Status: SHIPPED | OUTPATIENT
Start: 2021-11-09 | End: 2021-12-07

## 2021-11-09 RX ORDER — HYDROXYZINE HYDROCHLORIDE 25 MG/1
25-50 TABLET, FILM COATED ORAL 3 TIMES DAILY PRN
Qty: 90 TABLET | Refills: 1 | Status: SHIPPED | OUTPATIENT
Start: 2021-11-09 | End: 2022-03-09

## 2021-11-09 ASSESSMENT — ANXIETY QUESTIONNAIRES: GAD7 TOTAL SCORE: 9

## 2021-11-09 ASSESSMENT — PATIENT HEALTH QUESTIONNAIRE - PHQ9: SUM OF ALL RESPONSES TO PHQ QUESTIONS 1-9: 14
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
## 2021-11-09 NOTE — PATIENT INSTRUCTIONS
1. Make follow-up for 4 weeks out by calling [phone].  2. Stop buspirone. It does not need a taper when discontinuing. You can decrease to 10mg three times daily for 3 days and then stop if you feel side effects from stopping it abruptly.   3. Increase paroxetine to 30mg daily. Keep an eye on drowsiness and weight gain with this medication.  4. Start hydroxyzine 25-50mg up to 3 times daily if needed for anxiety. OK to take at bedtime to help with sleep.

## 2021-12-07 ENCOUNTER — VIRTUAL VISIT (OUTPATIENT)
Dept: PSYCHIATRY | Facility: CLINIC | Age: 56
End: 2021-12-07
Payer: COMMERCIAL

## 2021-12-07 DIAGNOSIS — F43.10 PTSD (POST-TRAUMATIC STRESS DISORDER): ICD-10-CM

## 2021-12-07 DIAGNOSIS — F41.1 GAD (GENERALIZED ANXIETY DISORDER): Primary | ICD-10-CM

## 2021-12-07 DIAGNOSIS — F33.1 MODERATE RECURRENT MAJOR DEPRESSION (H): ICD-10-CM

## 2021-12-07 PROCEDURE — 99214 OFFICE O/P EST MOD 30 MIN: CPT | Mod: GT | Performed by: STUDENT IN AN ORGANIZED HEALTH CARE EDUCATION/TRAINING PROGRAM

## 2021-12-07 RX ORDER — GABAPENTIN 100 MG/1
100 CAPSULE ORAL 3 TIMES DAILY
Qty: 90 CAPSULE | Refills: 1 | Status: SHIPPED | OUTPATIENT
Start: 2021-12-07 | End: 2022-02-21

## 2021-12-07 RX ORDER — PAROXETINE 30 MG/1
30 TABLET, FILM COATED ORAL AT BEDTIME
Qty: 30 TABLET | Refills: 1 | Status: SHIPPED | OUTPATIENT
Start: 2021-12-07 | End: 2022-03-17

## 2021-12-07 ASSESSMENT — ANXIETY QUESTIONNAIRES
8. IF YOU CHECKED OFF ANY PROBLEMS, HOW DIFFICULT HAVE THESE MADE IT FOR YOU TO DO YOUR WORK, TAKE CARE OF THINGS AT HOME, OR GET ALONG WITH OTHER PEOPLE?: SOMEWHAT DIFFICULT
3. WORRYING TOO MUCH ABOUT DIFFERENT THINGS: SEVERAL DAYS
6. BECOMING EASILY ANNOYED OR IRRITABLE: SEVERAL DAYS
7. FEELING AFRAID AS IF SOMETHING AWFUL MIGHT HAPPEN: NOT AT ALL
2. NOT BEING ABLE TO STOP OR CONTROL WORRYING: SEVERAL DAYS
1. FEELING NERVOUS, ANXIOUS, OR ON EDGE: NEARLY EVERY DAY
GAD7 TOTAL SCORE: 7
GAD7 TOTAL SCORE: 7
5. BEING SO RESTLESS THAT IT IS HARD TO SIT STILL: NOT AT ALL
GAD7 TOTAL SCORE: 7
4. TROUBLE RELAXING: SEVERAL DAYS
7. FEELING AFRAID AS IF SOMETHING AWFUL MIGHT HAPPEN: NOT AT ALL

## 2021-12-07 ASSESSMENT — PATIENT HEALTH QUESTIONNAIRE - PHQ9
SUM OF ALL RESPONSES TO PHQ QUESTIONS 1-9: 7
SUM OF ALL RESPONSES TO PHQ QUESTIONS 1-9: 7
10. IF YOU CHECKED OFF ANY PROBLEMS, HOW DIFFICULT HAVE THESE PROBLEMS MADE IT FOR YOU TO DO YOUR WORK, TAKE CARE OF THINGS AT HOME, OR GET ALONG WITH OTHER PEOPLE: VERY DIFFICULT

## 2021-12-07 NOTE — PATIENT INSTRUCTIONS
1. Call [phone] to schedule follow up with me in 6-8 weeks.   2. Continue hydroxyzine 30mg daily.  3. Start gabapentin 100mg TID for anxiety.  5. Continue Ambien CR 12.5mg if needed for sleep.  4. Send me a message if any problems or questions arise.
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
## 2021-12-07 NOTE — PROGRESS NOTES
Prachi is a 56 year old who is being evaluated via a billable video visit.      How would you like to obtain your AVS? MyChart  If the video visit is dropped, the invitation should be resent by: Text to cell phone: [phone]  Will anyone else be joining your video visit? No

## 2021-12-08 ASSESSMENT — ANXIETY QUESTIONNAIRES: GAD7 TOTAL SCORE: 7

## 2021-12-08 ASSESSMENT — PATIENT HEALTH QUESTIONNAIRE - PHQ9: SUM OF ALL RESPONSES TO PHQ QUESTIONS 1-9: 7

## 2022-01-31 NOTE — PROGRESS NOTES
"  SUBJECTIVE:   Iliana Mcfarlane is a 52 year old female who presents to clinic today for the following health issues: cold symptoms     She continues to feel unwell this week despite being home from the hospital and treated for her recent bout of kidney stones. She had been surgically treated as an outpatient on 11/30 with right ureteroscopy with retrograde pyelogram, laser lithotripsy, and ureteral stent placement for right ureteral stone. She had been able to remove the stent without any problems at home. Course of nitrofurantoin had been finished earlier this week. She had also been dealing with nausea unimproved by Zofran tablets.     Current symptoms include: throat pain (initially improving after intubation but again worsening), congestion, cough with thick sputum, and fatigue. She reports having spent basically the past 3 weeks in bed. Appetite has been poor having trouble keeping down due to nausea/vomiting. No fever, chills, diaphoresis. No diarrhea    She also notes progressive groin pain typical of her herpes flares in the past. She does not describes lesions of the skin but instead an \"internal\" pain. The timeline of this is difficult for her to determine, as she's also had pain in the area from the stent and surgery. She prefers not to take acyclovir, as this has given her nausea in the past.     Hospital Follow-up Visit:    Hospital/Nursing Home/IP Rehab Facility: [facility]  Date of Admission: 11/30/17  Date of Discharge: 11/30/17  Reason(s) for Admission: Kidney stone            Problems taking medications regularly:  None       Medication changes since discharge: pt complete meds post-surgery       Problems adhering to non-medication therapy:  None      Summary of hospitalization:  [facility] discharge summary reviewed  Diagnostic Tests/Treatments reviewed.  Follow up needed: BMP  Other Healthcare Providers Involved in Patient s Care:         None   Update since " Group Therapy Note    Date: 1/31/2022    Group Start Time: 1100  Group End Time: 5288  Group Topic: Cognitive skills    NIRMAL Dobbins        Group Therapy Note    Attendees: 5         Patient's Goal:  To improve coping skills     Notes:   Pt was pleasant and participated well     Status After Intervention:  Improved    Participation Level:  Active Listener and Interactive    Participation Quality: Appropriate      Speech:  normal      Thought Process/Content: Logical      Affective Functioning: Congruent      Mood: euthymic      Level of consciousness:  Alert      Response to Learning: Able to verbalize current knowledge/experience and Progressing to goal      Endings: None Reported    Modes of Intervention: Education and Activity      Discipline Responsible: Psychoeducational Specialist      Signature:  Polina Beal discharge: Additional issues: new cold symptoms, see above     Post Discharge Medication Reconciliation: discharge medications reconciled and changed, per note/orders (see AVS).  Plan of care communicated with patient     Coding guidelines for this visit:  Type of Medical   Decision Making Face-to-Face Visit       within 7 Days of discharge Face-to-Face Visit        within 14 days of discharge   Moderate Complexity [id number]   High Complexity [id number]            Problem list and histories reviewed & adjusted, as indicated.    Patient Active Problem List   Diagnosis     MENOMETRORRHAGIA     Anxiety     Glaucoma, Axenfeld     Genital herpes     Diverticulosis of large intestine without hemorrhage     Calculus of gallbladder without cholecystitis without obstruction     Past Surgical History:   Procedure Laterality Date     C NONSPECIFIC PROCEDURE      Upper GI endoscopy     C NONSPECIFIC PROCEDURE      Tonsillectomy     C NONSPECIFIC PROCEDURE      Oral surgery, wisdom teeth     C NONSPECIFIC PROCEDURE           C NONSPECIFIC PROCEDURE      Hysteroscopy, ablation of uterine fibroid     C NONSPECIFIC PROCEDURE      miscarriage     CATARACT IOL, RT/LT       COMBINED CYSTOSCOPY, RETROGRADES, URETEROSCOPY, LASER HOLMIUM LITHOTRIPSY URETER(S), INSERT STENT Right 2017    Procedure: COMBINED CYSTOSCOPY, RETROGRADES, URETEROSCOPY, LASER HOLMIUM LITHOTRIPSY URETER(S), INSERT STENT;  Cystoscopy, Right Ureteroscopy with Holmium Laser Lithotripsy, Right Stent Placement;  Surgeon: Helio Lauren MD;  Location: [facility] OR     ENT SURGERY      tonsillectomy     EYE SURGERY      trabeculectomy     GI SURGERY      dairy intolerence - resolved     HEAD & NECK SURGERY      trabeculectomy, cataract, yag     OPERATIVE HYSTEROSCOPY WITH MORCELLATOR  2014    Procedure: OPERATIVE HYSTEROSCOPY WITH MORCELLATOR;  Surgeon: Maureen Tavarez MD;  Location: [facility] OR     SURGICAL HISTORY OF -       right  eye trabeculectomy     TONSILLECTOMY  1987       Social History   Substance Use Topics     Smoking status: Never Smoker     Smokeless tobacco: Never Used     Alcohol use 0.0 oz/week      Comment: minimal     Family History   Problem Relation Age of Onset     Hypertension Mother      Obesity Mother      Hyperlipidemia Mother      DIABETES Paternal Grandmother      CEREBROVASCULAR DISEASE Paternal Grandmother      Alcohol/Drug Paternal Grandfather      CANCER Paternal Grandfather      Substance Abuse Paternal Grandfather      HEART DISEASE Maternal Grandmother      Alzheimer Disease Maternal Grandmother      not until mid-90 yrs     Breast Cancer Maternal Aunt      CANCER Maternal Aunt      brain     CANCER Father      Other Cancer Father      CANCER Maternal Grandfather          Current Outpatient Prescriptions   Medication Sig Dispense Refill     amoxicillin-clavulanate (AUGMENTIN) 875-125 MG per tablet Take 1 tablet by mouth 2 times daily 14 tablet 0     ibuprofen 100 MG TABS Take 500 mg by mouth as needed       ondansetron (ZOFRAN ODT) 4 MG ODT tab Take 1-2 tablets (4-8 mg) by mouth 3 times daily (before meals) 20 tablet 1     tamsulosin (FLOMAX) 0.4 MG capsule Take 1 capsule (0.4 mg) by mouth daily (Patient taking differently: Take 0.4 mg by mouth every morning ) 30 capsule 0     escitalopram (LEXAPRO) 20 MG tablet Take 1 tablet (20 mg) by mouth daily (Patient taking differently: Take 20 mg by mouth every evening ) 90 tablet PRN     norethindrone-ethinyl estradiol (JUNEL FE 1/20) 1-20 MG-MCG per tablet Take 1 tablet by mouth daily (Patient taking differently: Take 1 tablet by mouth every evening ) 84 tablet PRN     traZODone (DESYREL) 50 MG tablet Take 2 tablets (100 mg) by mouth nightly as needed for sleep 180 tablet PRN     zolpidem (AMBIEN CR) 6.25 MG CR tablet Take 1 tablet (6.25 mg) by mouth nightly as needed for sleep 90 tablet 1     Beclomethasone Dipropionate (QNASL) 80 MCG/ACT AERS Nasal Spray Spray 2  sprays into both nostrils daily 8.7 g 3     busPIRone (BUSPAR) 15 MG tablet Take 1 tablet (15 mg) by mouth 3 times daily (Patient taking differently: Take 15 mg by mouth as needed ) 270 tablet 3     Lifitegrast (XIIDRA) 5 % SOLN Apply 1 drop to eye 2 times daily        sodium chloride 0.9%, bottle, 0.9 % irrigation IRRIGATE 20 ML EACH NOSTRIL 4 TIMES DAILY FOR 2 MONTHS. 1000 mL 3     clindamycin (CLEOCIN) 150 MG capsule Open one capsule and place in 1 Litre of Normal Saline and mix. Irrigate with 20 cc each nostril QID. (Patient taking differently: as needed Open one capsule and place in 1 Litre of Normal Saline and mix. Irrigate with 20 cc each nostril QID.) 10 capsule 3     loratadine (CLARITIN) 10 MG tablet Take 10 mg by mouth as needed Reported on 3/22/2017       sodium chloride 0.9 % SOLN Irrigate 20 cc each nostril QID X 2 month supply (Patient taking differently: as needed Irrigate 20 cc each nostril QID X 2 month supply) 13643 mL 3     Cyanocobalamin (VITAMIN B 12 PO) Take 1 capsule by mouth every evening        Omega-3 Fatty Acids (OMEGA-3 FISH OIL PO) Take 4 capsules by mouth every evening        CALCIUM-MAGNESIUM-ZINC PO Take 1 tablet by mouth every evening        VITAMIN D, CHOLECALCIFEROL, PO Take by mouth every evening        VITAMIN K PO        Recent Labs   Lab Test  11/28/17   1631  04/11/17   0909  01/19/17   2112  07/20/16   1253  03/01/16   0914  10/14/14   0856  07/10/14   0749   LDL   --   131*   --    --   159*  148*   --    HDL   --   84   --    --   72  55   --    TRIG   --   91   --    --   104  150   --    ALT   --    --    --   27  145*   --   36   CR  1.05*   --   0.89   --   0.79   --   0.74   GFRESTIMATED  55*   --   67   --   77   --   84   GFRESTBLACK  67   --   81   --   >90   GFR Calc     --   >90   POTASSIUM  3.4   --   3.4   --   4.1   --   4.1   TSH   --    --    --    --    --   0.94   --      OBJECTIVE:     /72 (BP Location: Right arm, Patient Position:  Sitting, Cuff Size: Adult Regular)  Pulse 70  Temp 97.9  F (36.6  C) (Oral)  Resp 16  Wt 149 lb (67.6 kg)  LMP 11/24/2017  BMI 25.58 kg/m2  Body mass index is 25.58 kg/(m^2).     GENERAL: healthy, alert and no distress  HENT: ear canals and TM's normal, nose and mouth without ulcers or lesions  NECK: no asymmetry, masses, or scars and thyroid normal to palpation  RESP: lungs clear to auscultation - no rales, rhonchi or wheezes  CV: regular rate and rhythm, normal S1 S2, no S3 or S4, no murmur, click or rub, no peripheral edema and peripheral pulses strong  ABDOMEN: soft, nontender, no hepatosplenomegaly, no masses and bowel sounds normal  MS: no gross musculoskeletal defects noted, no edema  BACK: no CVA tenderness  LYMPH: no cervical, supraclavicular adenopathy    CBC without white count elevation, BMP with creatinine trending down to now 1.04, rapid strep negative, UA with moderate bacteria, many squamous epithelial, and mucous present. Leuk esterase negative. Urine culture pending. Strep culture pending.     ASSESSMENT/PLAN:     1. Dysuria  - UA reflex to Microscopic and Culture  - Urine Microscopic  - Urine Culture Aerobic Bacterial  - CBC with platelets  - amoxicillin-clavulanate (AUGMENTIN) 875-125 MG per tablet; Take 1 tablet by mouth 2 times daily  Dispense: 14 tablet; Refill: 0    2. Myalgia  Assessment: Has had generalized muscle pain throughout the span of this illness, so at least 1 week+. It's difficult for her to localized pain considering the recent bout of kidney stones and now recovering from surgery. Low suspicion for flu given prolonged course of this illness.     3. Abnormal renal function test  Assessment: will recheck  - Basic metabolic panel    4. Sore throat  Assessment: Initial throat pain after ureteral procedure likely secondary to intubation, especially as her discomfort had been improving with time. Worsening over this past week may represent bacterial vs viral pharyngitis.   -  Strep, Rapid Screen  - Beta strep group A culture    Brenton Rae MD  [facility]

## 2022-02-17 PROBLEM — K80.20 CALCULUS OF GALLBLADDER WITHOUT CHOLECYSTITIS WITHOUT OBSTRUCTION: Status: ACTIVE | Noted: 2017-11-24

## 2022-02-21 DIAGNOSIS — F41.1 GAD (GENERALIZED ANXIETY DISORDER): ICD-10-CM

## 2022-02-21 RX ORDER — GABAPENTIN 100 MG/1
100 CAPSULE ORAL 3 TIMES DAILY
Qty: 90 CAPSULE | Refills: 1 | Status: SHIPPED | OUTPATIENT
Start: 2022-02-21 | End: 2022-03-09

## 2022-02-21 NOTE — TELEPHONE ENCOUNTER
Date of Last Office Visit: 12/7/21  Date of Next Office Visit: 0 routing to scheduling  No shows since last visit: 0  Cancellations since last visit: 0         Review of [place] ?: yes  Medication last filled date: 1/3/22 Qty filled: 90  Other [place] substance on [place] ?: yes Zolpidem Tart Er 12.5 Mg Tab     If yes, is this a new medication?: no      Lapse in medication adherence greater than 5 days?: yes  If yes, call patient and gather details: attempted - did not answer  Medication refill request verified as identical to current order?: yes  Result of Last DAM, VPA, Li+ Level, CBC, or Carbamazepine Level (at or since last visit): N/A    Last visit treatment plan:     -START gabapentin 100mg TID for anxiety  -CONTINUE paroxetine 30mg daily for mood/anxiety  -CONTINUE Ambien CR 12.5mg PRN for insomnia  -HOLD trazodone  -STOP hydroxyzine     Labs:   -Not indicated today     Psychosocial:   - on waitlist until Feb/Mar      Follow-up: Follow up in 6-8 weeks    []Medication refilled per  Medication Refill in Ambulatory Care  policy.  [x]Medication unable to be refilled by RN due to criteria not met as indicated below:    []Eligibility - not seen in the last year   []Supervision - no future appointment   []Compliance - no shows, cancellations or lapse in therapy   []Verification - order discrepancy   [x]Controlled medication   [x]Medication not included in policy   []90-day supply request   []Other

## 2022-03-08 ASSESSMENT — PATIENT HEALTH QUESTIONNAIRE - PHQ9
10. IF YOU CHECKED OFF ANY PROBLEMS, HOW DIFFICULT HAVE THESE PROBLEMS MADE IT FOR YOU TO DO YOUR WORK, TAKE CARE OF THINGS AT HOME, OR GET ALONG WITH OTHER PEOPLE: SOMEWHAT DIFFICULT
10. IF YOU CHECKED OFF ANY PROBLEMS, HOW DIFFICULT HAVE THESE PROBLEMS MADE IT FOR YOU TO DO YOUR WORK, TAKE CARE OF THINGS AT HOME, OR GET ALONG WITH OTHER PEOPLE: SOMEWHAT DIFFICULT
SUM OF ALL RESPONSES TO PHQ QUESTIONS 1-9: 9
SUM OF ALL RESPONSES TO PHQ QUESTIONS 1-9: 9
SUM OF ALL RESPONSES TO PHQ QUESTIONS 1-9: 7
SUM OF ALL RESPONSES TO PHQ QUESTIONS 1-9: 7

## 2022-03-08 ASSESSMENT — ANXIETY QUESTIONNAIRES
4. TROUBLE RELAXING: MORE THAN HALF THE DAYS
2. NOT BEING ABLE TO STOP OR CONTROL WORRYING: SEVERAL DAYS
7. FEELING AFRAID AS IF SOMETHING AWFUL MIGHT HAPPEN: SEVERAL DAYS
1. FEELING NERVOUS, ANXIOUS, OR ON EDGE: MORE THAN HALF THE DAYS
3. WORRYING TOO MUCH ABOUT DIFFERENT THINGS: SEVERAL DAYS
4. TROUBLE RELAXING: MORE THAN HALF THE DAYS
GAD7 TOTAL SCORE: 8
2. NOT BEING ABLE TO STOP OR CONTROL WORRYING: SEVERAL DAYS
GAD7 TOTAL SCORE: 8
7. FEELING AFRAID AS IF SOMETHING AWFUL MIGHT HAPPEN: SEVERAL DAYS
6. BECOMING EASILY ANNOYED OR IRRITABLE: SEVERAL DAYS
GAD7 TOTAL SCORE: 8
5. BEING SO RESTLESS THAT IT IS HARD TO SIT STILL: NOT AT ALL
GAD7 TOTAL SCORE: 8
3. WORRYING TOO MUCH ABOUT DIFFERENT THINGS: SEVERAL DAYS
7. FEELING AFRAID AS IF SOMETHING AWFUL MIGHT HAPPEN: SEVERAL DAYS
GAD7 TOTAL SCORE: 8
5. BEING SO RESTLESS THAT IT IS HARD TO SIT STILL: NOT AT ALL
1. FEELING NERVOUS, ANXIOUS, OR ON EDGE: MORE THAN HALF THE DAYS
6. BECOMING EASILY ANNOYED OR IRRITABLE: SEVERAL DAYS
GAD7 TOTAL SCORE: 8
7. FEELING AFRAID AS IF SOMETHING AWFUL MIGHT HAPPEN: SEVERAL DAYS
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
## 2022-03-09 ENCOUNTER — VIRTUAL VISIT (OUTPATIENT)
Dept: PSYCHIATRY | Facility: CLINIC | Age: 57
End: 2022-03-09
Payer: COMMERCIAL

## 2022-03-09 ENCOUNTER — VIRTUAL VISIT (OUTPATIENT)
Dept: BEHAVIORAL HEALTH | Facility: CLINIC | Age: 57
End: 2022-03-09
Payer: COMMERCIAL

## 2022-03-09 DIAGNOSIS — F33.1 MODERATE RECURRENT MAJOR DEPRESSION (H): ICD-10-CM

## 2022-03-09 DIAGNOSIS — F41.1 GAD (GENERALIZED ANXIETY DISORDER): Primary | ICD-10-CM

## 2022-03-09 DIAGNOSIS — F43.10 PTSD (POST-TRAUMATIC STRESS DISORDER): ICD-10-CM

## 2022-03-09 PROCEDURE — 99207 PR CDG-MDM COMPONENT: MEETS MODERATE - UP CODED: CPT | Performed by: STUDENT IN AN ORGANIZED HEALTH CARE EDUCATION/TRAINING PROGRAM

## 2022-03-09 PROCEDURE — 99214 OFFICE O/P EST MOD 30 MIN: CPT | Mod: GT | Performed by: STUDENT IN AN ORGANIZED HEALTH CARE EDUCATION/TRAINING PROGRAM

## 2022-03-09 PROCEDURE — 90832 PSYTX W PT 30 MINUTES: CPT | Mod: GT | Performed by: SOCIAL WORKER

## 2022-03-09 RX ORDER — GABAPENTIN 100 MG/1
CAPSULE ORAL
Qty: 90 CAPSULE | Refills: 0 | Status: SHIPPED | OUTPATIENT
Start: 2022-03-09 | End: 2022-05-09

## 2022-03-09 ASSESSMENT — ANXIETY QUESTIONNAIRES
GAD7 TOTAL SCORE: 8
GAD7 TOTAL SCORE: 8

## 2022-03-09 ASSESSMENT — COLUMBIA-SUICIDE SEVERITY RATING SCALE - C-SSRS
1. IN THE PAST MONTH, HAVE YOU WISHED YOU WERE DEAD OR WISHED YOU COULD GO TO SLEEP AND NOT WAKE UP?: NO
ATTEMPT LIFETIME: NO
TOTAL  NUMBER OF INTERRUPTED ATTEMPTS LIFETIME: NO
2. HAVE YOU ACTUALLY HAD ANY THOUGHTS OF KILLING YOURSELF?: NO
6. HAVE YOU EVER DONE ANYTHING, STARTED TO DO ANYTHING, OR PREPARED TO DO ANYTHING TO END YOUR LIFE?: NO
1. HAVE YOU WISHED YOU WERE DEAD OR WISHED YOU COULD GO TO SLEEP AND NOT WAKE UP?: YES
TOTAL  NUMBER OF ABORTED OR SELF INTERRUPTED ATTEMPTS LIFETIME: NO

## 2022-03-09 ASSESSMENT — PATIENT HEALTH QUESTIONNAIRE - PHQ9
SUM OF ALL RESPONSES TO PHQ QUESTIONS 1-9: 9
SUM OF ALL RESPONSES TO PHQ QUESTIONS 1-9: 7

## 2022-03-09 NOTE — PROGRESS NOTES
[facility] Collaborative Care Psychiatry Service  March 9, 2022    Behavioral Health Clinician Progress Note    Patient Name: Iliana Mcfarlane      Telemedicine Visit: The patient's condition can be safely assessed and treated via synchronous audio and visual telemedicine encounter.      Reason for Telemedicine Visit: Services only offered telehealth    Originating Site (Patient Location): Patient's home    Distant Site (Provider Location): Provider Remote Setting- Home Office    Consent:  The patient/guardian has verbally consented to: the potential risks and benefits of telemedicine (video visit) versus in person care; bill my insurance or make self-payment for services provided; and responsibility for payment of non-covered services.     Mode of Communication:  Video Conference via [employer]    As the provider I attest to compliance with applicable laws and regulations related to telemedicine.         Service Type:  Individual      Service Location:   [facility] / Email (patient reached)     Session Start Time: 11:00 am  Session End Time: 11:20 am      Session Length: 16 - 37      Attendees: Patient    Visit Activities (Refresh list every visit): [facility] Only    Diagnostic Assessment Date: Pending  Treatment Plan Review Date: due by 06/09/2022  See Flowsheets for today's PHQ-9 and KANIKA-7 results  Previous PHQ-9:   PHQ-9 SCORE 12/7/2021 3/8/2022 3/8/2022   PHQ-9 Total Score - - -   PHQ-9 Total Score [facility] 7 (Mild depression) 9 (Mild depression) 7 (Mild depression)   PHQ-9 Total Score 7 7 9     Previous KANIKA-7:   KANIKA-7 SCORE 12/7/2021 3/8/2022 3/8/2022   Total Score 7 (mild anxiety) - 8 (mild anxiety)   Total Score 7 8 8       DATA  Extended Session (60+ minutes): No  Interactive Complexity: No  Crisis: No  [facility] Patient: No    Treatment Objective(s) Addressed in This Session:  Target Behavior(s): disease management/lifestyle changes Related to anxiety    Depressed Mood: Increase interest, engagement, and  "pleasure in doing things  Decrease frequency and intensity of feeling down, depressed, hopeless  Anxiety: will experience a reduction in anxiety and will develop healthy cognitive patterns and beliefs    Current Stressors / Issues:   Update: \"Ok.\" Doing well for a while and new medication helping. Still some anxiety during the day. Ended up breaking foot and became cranky from pain, etc. Took 6 weeks for that to improve. Eye sore and swollen. Feeling tired a lot and napping more than usual. Impacted by situation in [facility]- lived in [place] for 10 years and feel anxiety/worries about friends, people in general. Chest tightness, tense all over, wake up tired and feel have been \"running\" all night, still \"fight or flight.\" Several nights of not sleep and then able to sleep well the past few nights. Taking hot baths, yoga, meditate, aware of other skills.  Denies SI/SH. CSSRS completed this session.    Stressors: ex- still taking back to court over every little thing, ongoing problems with him and trying to use minimal communication.    Tx: Nyla Headley- private practice. See every two weeks for past three months. Now doing EMDR.    Appetite: weight gain- 10 pounds since new meds but also limited in exercise due to broken foot and poor food choices.  Sleep: some nights of restless sleep, hard time falling and staying asleep, some nights better, average 6-7 hours (not feel like enough)  Medical: no outstanding issues    Etoh: rarely- a glass of wine every 3 weeks  Substances: denies    Most important: followup on medications and potential changes to improve anxiety/sleep?    Progress on Treatment Objective(s) / Homework:  New Objective established this session - ACTION (Actively working towards change); Intervened by reinforcing change plan / affirming steps taken    Motivational Interviewing    MI Intervention: Co-Developed Goal: decreasing anxiety, Expressed Empathy/Understanding, Open-ended questions, " Reflections: simple and complex, Change talk (evoked) and Reframe     Change Talk Expressed by the Patient: Desire to change Reasons to change Need to change Committment to change Activation Taking steps    Provider Response to Change Talk: E - Evoked more info from patient about behavior change, A - Affirmed patient's thoughts, decisions, or attempts at behavior change, R - Reflected patient's change talk and S - Summarized patient's change talk statements    Also provided psychoeducation about behavioral health condition, symptoms, and treatment options    Care Plan review completed: No    Medication Review:  No changes to current psychiatric medication(s)    Medication Compliance:  Yes    Changes in Health Issues:   Yes: Sleep disturbance, Associated Psychological Distress Broke foot 6+ weeks ago but improving    Chemical Use Review:   Substance Use: Chemical use reviewed, no active concerns identified      Tobacco Use: No current tobacco use.      Assessment: Current Emotional / Mental Status (status of significant symptoms):  Risk status (Self / Other harm or suicidal ideation)  Patient has had a history of suicidal ideation: Passive SI during her divorce  Patient denies current fears or concerns for personal safety.  Patient denies current or recent suicidal ideation or behaviors.  Patient denies current or recent homicidal ideation or behaviors.  Patient denies current or recent self injurious behavior or ideation.  Patient denies other safety concerns.  A safety and risk management plan has not been developed at this time, however patient was encouraged to call [place] [phone] should there be a change in any of these risk factors.    Appearance:   Appropriate   Eye Contact:   Good   Psychomotor Behavior: Normal   Attitude:   Cooperative  Interested Pleasant  Orientation:   All  Speech   Rate / Production: Normal    Volume:  Normal   Mood:    Anxious  Irritable   Affect:    Appropriate   Thought  Content:  Clear   Thought Form:  Coherent  Logical   Insight:    Good     Diagnoses:  1. KANIKA (generalized anxiety disorder)    2. Moderate recurrent major depression (H)      Collateral Reports Completed:  [facility]    Plan: (Homework, other):  Patient was given information about behavioral services and instructed to schedule a follow up appointment with the [facility] in conjunction with next [facility] appointment.  as needed.  She was also given Cognitive Behavioral Therapy skills to practice when experiencing anxiety at night.  CD Recommendations: No indications of CD issues.     ______________________________________________________________________    [facility] Psychiatry Service    Patient's Name: Iliana Mcfarlane  YOB: 1965    Date of Creation: 03/09/2022  Date Treatment Plan Last Reviewed/Revised: pending    DSM5 Diagnoses: 296.32 (F33.1) Major Depressive Disorder, Recurrent Episode, Moderate _ or 300.02 (F41.1) Generalized Anxiety Disorder  Psychosocial / Contextual Factors: Patient reports ongoing stressors related to a divorce and continual custody issues.  Patient is unable to see one of her children due to parental estrangement.  PROMIS (reviewed every 90 days): 24    Referral / Collaboration:  Was/were discussed and patient will pursue.  Patient is seeing a therapist for EMDR and will continue to work on her therapeutic needs, including acute anxiety and insomnia.    Anticipated number of session for this episode of care: 4-6 sessions   Anticipation frequency of session: Every 3 months  Anticipated Duration of each session: 16-37 minutes  Treatment plan will be reviewed in 90 days or when goals have been changed.       MeasurableTreatment Goal(s) related to diagnosis / functional impairment(s)  Goal 1: Patient will report improvement in her acute anxiety and accompanying insomnia    I will know I've met my goal when I am less in that fight or flight.       Objective #A (Patient Action)    Patient will use cognitive strategies identified in therapy to challenge anxious thoughts.  Status: New - Date: 03/09/2022     Intervention(s)  Therapist will teach emotional regulation skills. CBT and mindfulness.    Objective #B  Patient will identify Rational and irrational fears / thoughts that contribute to feeling anxious.  Status: New - Date: 03/09/2022     Intervention(s)  Therapist will teach emotional recognition/identification. CBT for anxiety.      Patient has reviewed and agreed to the above plan.      HEATH Echevarria  March 9, 2022  Answers for HPI/ROS submitted by the patient on 3/8/2022  If you checked off any problems, how difficult have these problems made it for you to do your work, take care of things at home, or get along with other people?: Somewhat difficult  PHQ9 TOTAL SCORE: 7  KANIKA 7 TOTAL SCORE: 8

## 2022-03-09 NOTE — PROGRESS NOTES
Prachi is a 57 year old who is being evaluated via a billable video visit.      How would you like to obtain your AVS? MyChart  If the video visit is dropped, the invitation should be resent by: Text to cell phone: [phone]  Will anyone else be joining your video visit? Ginny PALACIO

## 2022-03-09 NOTE — NURSING NOTE
Pt stated she verified meds during echeckin and will talk with you regarding them.    Sandy Ross VF

## 2022-03-09 NOTE — PATIENT INSTRUCTIONS
1. Continue paroxetine 30mg daily for anxiety/depression  2. Change gabapentin to 300mg once daily in the afternoon for anxiety  3. Continue Ambien CR 12.5mg as needed at bedtime for insomnia  4. Call [phone] to schedule follow up with me in 4-6 weeks  5. Continue therapy as scheduled

## 2022-03-17 DIAGNOSIS — F41.1 GAD (GENERALIZED ANXIETY DISORDER): ICD-10-CM

## 2022-03-17 DIAGNOSIS — F33.1 MODERATE RECURRENT MAJOR DEPRESSION (H): ICD-10-CM

## 2022-03-17 RX ORDER — PAROXETINE 30 MG/1
30 TABLET, FILM COATED ORAL AT BEDTIME
Qty: 30 TABLET | Refills: 1 | Status: SHIPPED | OUTPATIENT
Start: 2022-03-17 | End: 2022-05-09

## 2022-03-17 NOTE — TELEPHONE ENCOUNTER
Date of Last Office Visit: 3/9/22  Date of Next Office Visit: 0 - routing to scheduling  No shows since last visit: 0  Cancellations since last visit: 0    Medication requested: PARoxetine (PAXIL) 30 MG tablet Date last ordered: 12/7/22 Qty: 30 Refills: 1     Review of [place] ?: NA    Lapse in medication adherence greater than 5 days?: YES  If yes, call patient and gather details: attempted - unsuccessful  Medication refill request verified as identical to current order?: yes  Result of Last DAM, VPA, Li+ Level, CBC, or Carbamazepine Level (at or since last visit): N/A    Last visit treatment plan:     MEDICATIONS:   -CHANGE gabapentin to 300mg once daily in the afternoon for anxiety  -CONTINUE paroxetine 30mg daily for mood/anxiety  -CONTINUE Ambien CR 12.5mg PRN for insomnia (rx by PCP)     FOLLOW-UP: Follow up in 4-6 weeks    []Medication refilled per  Medication Refill in Ambulatory Care  policy.  [x]Medication unable to be refilled by RN due to criteria not met as indicated below:    []Eligibility - not seen in the last year   []Supervision - no future appointment   []Compliance - no shows, cancellations or lapse in therapy   []Verification - order discrepancy   []Controlled medication   [x]Medication not included in policy   []90-day supply request   []Other

## 2022-04-17 ENCOUNTER — HEALTH MAINTENANCE LETTER (OUTPATIENT)
Age: 57
End: 2022-04-17

## 2022-05-05 ENCOUNTER — MYC MEDICAL ADVICE (OUTPATIENT)
Dept: PSYCHIATRY | Facility: CLINIC | Age: 57
End: 2022-05-05
Payer: COMMERCIAL

## 2022-05-05 DIAGNOSIS — F41.1 GAD (GENERALIZED ANXIETY DISORDER): ICD-10-CM

## 2022-05-05 DIAGNOSIS — F33.1 MODERATE RECURRENT MAJOR DEPRESSION (H): ICD-10-CM

## 2022-05-06 NOTE — TELEPHONE ENCOUNTER
Date of Last Office Visit: 3/9/2022  Date of Next Office Visit: none (scheduling, please connect with patient and schedule follow up appointment with provider)  No shows since last visit: none  Cancellations since last visit: none    Medication requested: Paxil 0 mg tablet Date last ordered: 3/17/2022 Qty: 30 Refills: 1     Review of [place] ?: n/a    Lapse in medication adherence greater than 5 days?: no  If yes, call patient and gather details: no  Medication refill request verified as identical to current order?: yes  Result of Last DAM, VPA, Li+ Level, CBC, or Carbamazepine Level (at or since last visit): N/A    Last visit treatment plan:   TREATMENT PLAN: Medication side effects and alternatives reviewed. Health promotion activities recommended and reviewed. All questions addressed. Education and counseling completed regarding risks and benefits of medications and psychotherapy options. Collaborative Care Psychiatry Service model reviewed today. Recommend therapy for additional support. Safety plan reviewed as indicated.     MEDICATIONS:   -CHANGE gabapentin to 300mg once daily in the afternoon for anxiety  -CONTINUE paroxetine 30mg daily for mood/anxiety  -CONTINUE Ambien CR 12.5mg PRN for insomnia (rx by PCP)     LABS/RADS:   -None at this time     PSYCHOSOCIAL:   -continue ind therapy  -Follow up with primary care provider as planned or for acute medical concerns    []Medication refilled per  Medication Refill in Ambulatory Care  policy.  [x]Medication unable to be refilled by RN due to criteria not met as indicated below:    []Eligibility - not seen in the last year   []Supervision - no future appointment   []Compliance - no shows, cancellations or lapse in therapy   []Verification - order discrepancy   []Controlled medication   [x]Medication not included in policy   []90-day supply request   []Other

## 2022-05-09 RX ORDER — GABAPENTIN 100 MG/1
CAPSULE ORAL
Qty: 90 CAPSULE | Refills: 0 | Status: SHIPPED | OUTPATIENT
Start: 2022-05-09 | End: 2022-05-16

## 2022-05-09 RX ORDER — PAROXETINE 30 MG/1
30 TABLET, FILM COATED ORAL AT BEDTIME
Qty: 30 TABLET | Refills: 1 | Status: SHIPPED | OUTPATIENT
Start: 2022-05-09 | End: 2022-05-16

## 2022-05-09 NOTE — TELEPHONE ENCOUNTER
Outpatient Medication Detail     Disp Refills Start End SOPHIA   gabapentin (NEURONTIN) 100 MG capsule 90 capsule 0 3/9/2022  No   Sig: Take 300mg by mouth once daily in the afternoon for anxiety.   Sent to pharmacy as: Gabapentin 100 MG Oral Capsule (NEURONTIN)   Class: E-Prescribe   Order: [id number]   E-Prescribing Status: Receipt confirmed by pharmacy (3/9/2022 11:44 AM CST)

## 2022-05-16 ENCOUNTER — VIRTUAL VISIT (OUTPATIENT)
Dept: PSYCHIATRY | Facility: CLINIC | Age: 57
End: 2022-05-16
Payer: COMMERCIAL

## 2022-05-16 ENCOUNTER — VIRTUAL VISIT (OUTPATIENT)
Dept: BEHAVIORAL HEALTH | Facility: CLINIC | Age: 57
End: 2022-05-16
Payer: COMMERCIAL

## 2022-05-16 DIAGNOSIS — F41.1 GAD (GENERALIZED ANXIETY DISORDER): Primary | ICD-10-CM

## 2022-05-16 DIAGNOSIS — F33.1 MODERATE RECURRENT MAJOR DEPRESSION (H): ICD-10-CM

## 2022-05-16 PROCEDURE — 90832 PSYTX W PT 30 MINUTES: CPT | Mod: GT | Performed by: SOCIAL WORKER

## 2022-05-16 PROCEDURE — 99214 OFFICE O/P EST MOD 30 MIN: CPT | Mod: 25 | Performed by: STUDENT IN AN ORGANIZED HEALTH CARE EDUCATION/TRAINING PROGRAM

## 2022-05-16 RX ORDER — GABAPENTIN 300 MG/1
CAPSULE ORAL
Qty: 90 CAPSULE | Refills: 0 | Status: SHIPPED | OUTPATIENT
Start: 2022-05-16 | End: 2022-07-19

## 2022-05-16 RX ORDER — PAROXETINE 30 MG/1
30 TABLET, FILM COATED ORAL AT BEDTIME
Qty: 90 TABLET | Refills: 0 | Status: SHIPPED | OUTPATIENT
Start: 2022-05-16 | End: 2022-07-19

## 2022-05-16 ASSESSMENT — PATIENT HEALTH QUESTIONNAIRE - PHQ9
SUM OF ALL RESPONSES TO PHQ QUESTIONS 1-9: 6
10. IF YOU CHECKED OFF ANY PROBLEMS, HOW DIFFICULT HAVE THESE PROBLEMS MADE IT FOR YOU TO DO YOUR WORK, TAKE CARE OF THINGS AT HOME, OR GET ALONG WITH OTHER PEOPLE: NOT DIFFICULT AT ALL
10. IF YOU CHECKED OFF ANY PROBLEMS, HOW DIFFICULT HAVE THESE PROBLEMS MADE IT FOR YOU TO DO YOUR WORK, TAKE CARE OF THINGS AT HOME, OR GET ALONG WITH OTHER PEOPLE: NOT DIFFICULT AT ALL

## 2022-05-16 NOTE — PROGRESS NOTES
Nikkie is a 57 year old who is being evaluated via a billable video visit.      How would you like to obtain your AVS? MyChart  If the video visit is dropped, the invitation should be resent by: Send to e-mail at: [email or web address]  Will anyone else be joining your video visit? Ginny PALACIO

## 2022-05-16 NOTE — NURSING NOTE
Patient denies any changes since echeck-in regarding medication and allergies and states all information entered during echeck-in remains accurate.    Sandy PALACIO

## 2022-05-16 NOTE — PROGRESS NOTES
[facility] Collaborative Care Psychiatry Service  May 16, 2022    Behavioral Health Clinician Progress Note    Patient Name: Iliana Mcfarlane      Telemedicine Visit: The patient's condition can be safely assessed and treated via synchronous audio and visual telemedicine encounter.      Reason for Telemedicine Visit: Services only offered telehealth    Originating Site (Patient Location): Patient's home    Distant Site (Provider Location): Provider Remote Setting- Home Office    Consent:  The patient/guardian has verbally consented to: the potential risks and benefits of telemedicine (video visit) versus in person care; bill my insurance or make self-payment for services provided; and responsibility for payment of non-covered services.     Mode of Communication:  Video Conference via [employer]    As the provider I attest to compliance with applicable laws and regulations related to telemedicine.         Service Type:  Individual      Service Location:   [place] / Email (patient reached)     Session Start Time: 11:01  Session End Time: 11:18 am      Session Length: 16 - 37      Attendees: Patient    Visit Activities (Refresh list every visit): [facility] Only    [facility]: [facility]  Treatment Plan Review Date: due by 06/09/2022  See Flowsheets for today's PHQ-9 and KANIKA-7 results  Previous PHQ-9:   PHQ-9 SCORE 3/8/2022 5/16/2022 5/16/2022   PHQ-9 Total Score - - -   [facility] - 6 (Mild depression)   PHQ-9 Total Score 9 6 6     Previous KANIKA-7:   KANIKA-7 SCORE 12/7/2021 3/8/2022 3/8/2022   Total Score 7 (mild anxiety) - 8 (mild anxiety)   Total Score 7 8 8       DATA  Extended Session (60+ minutes): No  Interactive Complexity: No  Crisis: No  [facility] Patient: No    Treatment Objective(s) Addressed in This Session:  Target Behavior(s): disease management/lifestyle changes Related to anxiety    Depressed Mood: Increase interest, engagement, and pleasure in doing  "things  Decrease frequency and intensity of feeling down, depressed, hopeless  Anxiety: will experience a reduction in anxiety and will develop healthy cognitive patterns and beliefs    Current Stressors / Issues:   Update: \"good, going fine.\" Depression and anxiety have been stable. Not noticing any concerns with mood. Concerns with weight gain- increase in 30 pounds in the past 3 months. Patient acknowledges that her activity was restricted due to winter and breaking her foot.  She is starting to see some improvement in pain and trying to be a little more active.  Patient is returning to in office work and this will help increase her walking and movement as well.  Patient admits that her eating practices have not been great the past couple of months.  She has been snacking on unhealthy foods and recognizes it is hard for her to plan and cook bigger meals.  Patient reports continued anxiety about the situation in [facility] but is feeling less pressure/panic. Sleep can still be problematic as she has a hard time falling to sleep some nights.  Patient is still actively using her self-care practices (taking hot baths, yoga, meditation, etc.) and attending therapy.  Patient is also hopeful because her daughter is home from college and that typically helps her with mood and positive lifestyle choices.  [facility] praised patient for her progress and insight.  [facility] processed lifestyle choices and how they relate to mental health.  [facility] provided different ideas of starting points for patient to start focusing on healthier choices and patient indicated she does feel ready.  Patient has the resources and knowledge needed.  Patient is concerned about her medications potentially causing the weight increase as well.    Stressors: Home repairs.  Ongoing health issues with pain and limited movement but improving.  [facility] encouraged patient to consider physical therapy if needed.    Tx: Nyla Headley- private practice. Still seeing every two " weeks or so and focused on EMDR. Helpful. Good connection.    Etoh: rare use  Substances: denies  Nicotine: non smoker  Caffeine: 1 cup coffee or tea in the morning    Most important: check in and make plan for when provider is on medical leave. Medication seems stable but concerns about weight increase.    Progress on Treatment Objective(s) / Homework:  Satisfactory progress - ACTION (Actively working towards change); Intervened by reinforcing change plan / affirming steps taken    Motivational Interviewing    MI Intervention: Co-Developed Goal: decreasing anxiety, Expressed Empathy/Understanding, Open-ended questions, Reflections: simple and complex, Change talk (evoked) and Reframe     Change Talk Expressed by the Patient: Desire to change Ability to change Reasons to change Need to change Committment to change Activation Taking steps    Provider Response to Change Talk: E - Evoked more info from patient about behavior change, A - Affirmed patient's thoughts, decisions, or attempts at behavior change, R - Reflected patient's change talk and S - Summarized patient's change talk statements    Also provided psychoeducation about behavioral health condition, symptoms, and treatment options    Care Plan review completed: No    Medication Review:  Changes to psychiatric medications, see updated Medication List in EPIC.     Medication Compliance:  Yes    Changes in Health Issues:   Yes: Sleep disturbance, Associated Psychological Distress still some difficulties in falling asleep    Chemical Use Review:   Substance Use: Chemical use reviewed, no active concerns identified      Tobacco Use: No current tobacco use.      Assessment: Current Emotional / Mental Status (status of significant symptoms):  Risk status (Self / Other harm or suicidal ideation)  Patient has had a history of suicidal ideation: Passive SI during her divorce  Patient denies current fears or concerns for personal safety.  Patient denies current or  recent suicidal ideation or behaviors.  Patient denies current or recent homicidal ideation or behaviors.  Patient denies current or recent self injurious behavior or ideation.  Patient denies other safety concerns.  A safety and risk management plan has not been developed at this time, however patient was encouraged to call [place] [phone] should there be a change in any of these risk factors.    Appearance:   Appropriate   Eye Contact:   Good   Psychomotor Behavior: Normal   Attitude:   Cooperative  Interested Pleasant  Orientation:   All  Speech   Rate / Production: Normal    Volume:  Normal   Mood:    Anxious   Affect:    Appropriate   Thought Content:  Clear   Thought Form:  Coherent  Logical   Insight:    Good     Diagnoses:  1. KANIKA (generalized anxiety disorder)    2. Moderate recurrent major depression (H)      Collateral Reports Completed:  [facility]    Plan: (Homework, other):  Patient was given information about behavioral services and instructed to schedule a follow up appointment with [facility].  as needed.  She was also given Cognitive Behavioral Therapy skills to practice when experiencing anxiety at night.  CD Recommendations: No indications of CD issues.     ______________________________________________________________________    [facility] Psychiatry Service    Patient's Name: Iliana Mcfarlane  YOB: 1965    Date of Creation: 03/09/2022  Date Treatment Plan Last Reviewed/Revised: pending    DSM5 Diagnoses: 296.32 (F33.1) Major Depressive Disorder, Recurrent Episode, Moderate _ or 300.02 (F41.1) Generalized Anxiety Disorder  Psychosocial / Contextual Factors: Patient reports ongoing stressors related to a divorce and continual custody issues.  Patient is unable to see one of her children due to parental estrangement.  PROMIS (reviewed every 90 days): 24    Referral / Collaboration:  Was/were  discussed and patient will pursue.  Patient is seeing a therapist for EMDR and will continue to work on her therapeutic needs, including acute anxiety and insomnia.    Anticipated number of session for this episode of care: 4-6 sessions   Anticipation frequency of session: Every 3 months  Anticipated Duration of each session: 16-37 minutes  Treatment plan will be reviewed in 90 days or when goals have been changed.       MeasurableTreatment Goal(s) related to diagnosis / functional impairment(s)  Goal 1: Patient will report improvement in her acute anxiety and accompanying insomnia    I will know I've met my goal when I am less in that fight or flight.      Objective #A (Patient Action)    Patient will use cognitive strategies identified in therapy to challenge anxious thoughts.  Status: New - Date: 03/09/2022     Intervention(s)  Therapist will teach emotional regulation skills. CBT and mindfulness.    Objective #B  Patient will identify Rational and irrational fears / thoughts that contribute to feeling anxious.  Status: New - Date: 03/09/2022     Intervention(s)  Therapist will teach emotional recognition/identification. CBT for anxiety.      Patient has reviewed and agreed to the above plan.      HEATH Echevarria  May 16, 2022    Answers for HPI/ROS submitted by the patient on 5/16/2022  If you checked off any problems, how difficult have these problems made it for you to do your work, take care of things at home, or get along with other people?: Not difficult at all  PHQ9 TOTAL SCORE: 6

## 2022-05-16 NOTE — Clinical Note
I am returning psychiatric care back to you for ongoing medication prescribing. P has graduated from [facility] due to ongoing stability. Future medication refills will come from you (I provided 90d Paxil and gabapentin). I advised pt schedule mental health check-in with you within 90 days. More details about treatment plan are in my note including recs for med changes as pt is concerned Paxil caused weight gain.  Pt can be re-referred back to this service for further consultation in the future if needed but a new referral will need to be placed. If the patient is likely to have ongoing chronic and complex psychiatric needs, consider mental health referral for community/long-term psychiatric care vs [facility] referral. Most patients are able to get scheduled with a community psychiatric provider within 8 weeks of referral.  Please let me know if any questions/concerns.  Thank you for the referral!   DARRIN Castanon [facility]

## 2022-05-16 NOTE — PATIENT INSTRUCTIONS
Continue paroxetine 30mg at bedtime for mood/anxiety  Continue gabapentin 300mg daily in the afternoon for anxiety  Continue Ambien CR 12.5mg at bedtime if needed for insomnia  Thank you for our work together in the Psychiatry Collaborative Care Model at [facility]. This is our last visit and I am returning your care back to your Primary Care Provider Janey Aguirre . You should schedule an appointment to check in with them within 90 days of this transition. You may begin requesting refills from your PCP. If you are not doing well, please contact your Primary Care Provider's office.
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
## 2022-05-17 ENCOUNTER — IMMUNIZATION (OUTPATIENT)
Dept: NURSING | Facility: CLINIC | Age: 57
End: 2022-05-17
Payer: COMMERCIAL

## 2022-05-17 PROCEDURE — 91305 COVID-19,PF,PFIZER (12+ YRS): CPT

## 2022-05-17 PROCEDURE — 0054A COVID-19,PF,PFIZER (12+ YRS): CPT

## 2022-05-17 ASSESSMENT — PATIENT HEALTH QUESTIONNAIRE - PHQ9
SUM OF ALL RESPONSES TO PHQ QUESTIONS 1-9: 6
SUM OF ALL RESPONSES TO PHQ QUESTIONS 1-9: 6

## 2022-07-19 ENCOUNTER — OFFICE VISIT (OUTPATIENT)
Dept: FAMILY MEDICINE | Facility: CLINIC | Age: 57
End: 2022-07-19
Payer: COMMERCIAL

## 2022-07-19 VITALS
TEMPERATURE: 98.3 F | BODY MASS INDEX: 31.71 KG/M2 | SYSTOLIC BLOOD PRESSURE: 136 MMHG | RESPIRATION RATE: 16 BRPM | OXYGEN SATURATION: 94 % | HEIGHT: 63 IN | WEIGHT: 179 LBS | HEART RATE: 90 BPM | DIASTOLIC BLOOD PRESSURE: 88 MMHG

## 2022-07-19 DIAGNOSIS — R60.0 PERIPHERAL EDEMA: ICD-10-CM

## 2022-07-19 DIAGNOSIS — Z00.00 ROUTINE GENERAL MEDICAL EXAMINATION AT A HEALTH CARE FACILITY: Primary | ICD-10-CM

## 2022-07-19 DIAGNOSIS — H81.10 BENIGN PAROXYSMAL POSITIONAL VERTIGO, UNSPECIFIED LATERALITY: ICD-10-CM

## 2022-07-19 DIAGNOSIS — Z12.4 CERVICAL CANCER SCREENING: ICD-10-CM

## 2022-07-19 DIAGNOSIS — F41.1 GAD (GENERALIZED ANXIETY DISORDER): ICD-10-CM

## 2022-07-19 DIAGNOSIS — R73.09 ELEVATED GLUCOSE: ICD-10-CM

## 2022-07-19 DIAGNOSIS — F51.01 PRIMARY INSOMNIA: ICD-10-CM

## 2022-07-19 DIAGNOSIS — F33.1 MODERATE RECURRENT MAJOR DEPRESSION (H): ICD-10-CM

## 2022-07-19 DIAGNOSIS — R63.5 WEIGHT GAIN: ICD-10-CM

## 2022-07-19 LAB — HBA1C MFR BLD: 5.7 % (ref 0–5.6)

## 2022-07-19 PROCEDURE — G0145 SCR C/V CYTO,THINLAYER,RESCR: HCPCS | Performed by: NURSE PRACTITIONER

## 2022-07-19 PROCEDURE — 83036 HEMOGLOBIN GLYCOSYLATED A1C: CPT | Performed by: NURSE PRACTITIONER

## 2022-07-19 PROCEDURE — 80053 COMPREHEN METABOLIC PANEL: CPT | Performed by: NURSE PRACTITIONER

## 2022-07-19 PROCEDURE — 87624 HPV HI-RISK TYP POOLED RSLT: CPT | Performed by: NURSE PRACTITIONER

## 2022-07-19 PROCEDURE — 36415 COLL VENOUS BLD VENIPUNCTURE: CPT | Performed by: NURSE PRACTITIONER

## 2022-07-19 PROCEDURE — 84443 ASSAY THYROID STIM HORMONE: CPT | Performed by: NURSE PRACTITIONER

## 2022-07-19 PROCEDURE — 99396 PREV VISIT EST AGE 40-64: CPT | Performed by: NURSE PRACTITIONER

## 2022-07-19 PROCEDURE — 99213 OFFICE O/P EST LOW 20 MIN: CPT | Mod: 25 | Performed by: NURSE PRACTITIONER

## 2022-07-19 RX ORDER — ZOLPIDEM TARTRATE 12.5 MG/1
TABLET, FILM COATED, EXTENDED RELEASE ORAL
Qty: 90 TABLET | Refills: 1 | Status: SHIPPED | OUTPATIENT
Start: 2022-07-19 | End: 2022-12-16

## 2022-07-19 RX ORDER — GABAPENTIN 300 MG/1
CAPSULE ORAL
Qty: 90 CAPSULE | Refills: 0 | Status: SHIPPED | OUTPATIENT
Start: 2022-07-19 | End: 2022-10-02

## 2022-07-19 RX ORDER — PAROXETINE 20 MG/1
20 TABLET, FILM COATED ORAL EVERY MORNING
Qty: 20 TABLET | Refills: 0 | Status: SHIPPED | OUTPATIENT
Start: 2022-07-19 | End: 2022-08-08

## 2022-07-19 ASSESSMENT — ENCOUNTER SYMPTOMS
ABDOMINAL PAIN: 0
HEARTBURN: 1
DIARRHEA: 0
DIZZINESS: 1
EYE PAIN: 0
BREAST MASS: 0
CONSTIPATION: 0
CHILLS: 0
FREQUENCY: 0
FEVER: 0
NERVOUS/ANXIOUS: 0
HEMATURIA: 0
HEMATOCHEZIA: 0
JOINT SWELLING: 0
DYSURIA: 0
COUGH: 1
PALPITATIONS: 0
PARESTHESIAS: 0
HEADACHES: 0
NAUSEA: 0
SHORTNESS OF BREATH: 0
SORE THROAT: 1
WEAKNESS: 0
ARTHRALGIAS: 0
MYALGIAS: 0

## 2022-07-19 ASSESSMENT — ASTHMA QUESTIONNAIRES
QUESTION_5 LAST FOUR WEEKS HOW WOULD YOU RATE YOUR ASTHMA CONTROL: WELL CONTROLLED
ACT_TOTALSCORE: 24
QUESTION_2 LAST FOUR WEEKS HOW OFTEN HAVE YOU HAD SHORTNESS OF BREATH: NOT AT ALL
QUESTION_4 LAST FOUR WEEKS HOW OFTEN HAVE YOU USED YOUR RESCUE INHALER OR NEBULIZER MEDICATION (SUCH AS ALBUTEROL): NOT AT ALL
QUESTION_1 LAST FOUR WEEKS HOW MUCH OF THE TIME DID YOUR ASTHMA KEEP YOU FROM GETTING AS MUCH DONE AT WORK, SCHOOL OR AT HOME: NONE OF THE TIME
ACT_TOTALSCORE: 24
QUESTION_3 LAST FOUR WEEKS HOW OFTEN DID YOUR ASTHMA SYMPTOMS (WHEEZING, COUGHING, SHORTNESS OF BREATH, CHEST TIGHTNESS OR PAIN) WAKE YOU UP AT NIGHT OR EARLIER THAN USUAL IN THE MORNING: NOT AT ALL
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
## 2022-07-19 NOTE — PATIENT INSTRUCTIONS
Decrease Paxil to 20 mg daily PLUS 1/2 tablet of Sertraline for one week,  Then decrease Paxil to 1/2 tablet (10 mg) PLUS increase Sertraline to one tablet (50 mg) daily for one week,  Then take 1/2 tablet Paxil every other day and continue taking Sertraline daily for one week,  Then stop Paxil.      Preventive Health Recommendations  Female Ages 50 - 64    Yearly exam: See your health care provider every year in order to  Review health changes.   Discuss preventive care.    Review your medicines if your doctor has prescribed any.    Get a Pap test every three years (unless you have an abnormal result and your provider advises testing more often).  If you get Pap tests with HPV test, you only need to test every 5 years, unless you have an abnormal result.   You do not need a Pap test if your uterus was removed (hysterectomy) and you have not had cancer.  You should be tested each year for STDs (sexually transmitted diseases) if you're at risk.   Have a mammogram every 1 to 2 years.  Have a colonoscopy at age 50, or have a yearly FIT test (stool test). These exams screen for colon cancer.    Have a cholesterol test every 5 years, or more often if advised.  Have a diabetes test (fasting glucose) every three years. If you are at risk for diabetes, you should have this test more often.   If you are at risk for osteoporosis (brittle bone disease), think about having a bone density scan (DEXA).    Shots: Get a flu shot each year. Get a tetanus shot every 10 years.    Nutrition:   Eat at least 5 servings of fruits and vegetables each day.  Eat whole-grain bread, whole-wheat pasta and brown rice instead of white grains and rice.  Get adequate Calcium and Vitamin D.     Lifestyle  Exercise at least 150 minutes a week (30 minutes a day, 5 days a week). This will help you control your weight and prevent disease.  Limit alcohol to one drink per day.  No smoking.   Wear sunscreen to prevent skin cancer.   See your dentist every  six months for an exam and cleaning.  See your eye doctor every 1 to 2 years.

## 2022-07-20 LAB
ALBUMIN SERPL-MCNC: 3.6 G/DL (ref 3.4–5)
ALP SERPL-CCNC: 125 U/L (ref 40–150)
ALT SERPL W P-5'-P-CCNC: 48 U/L (ref 0–50)
ANION GAP SERPL CALCULATED.3IONS-SCNC: 4 MMOL/L (ref 3–14)
AST SERPL W P-5'-P-CCNC: 27 U/L (ref 0–45)
BILIRUB SERPL-MCNC: 0.2 MG/DL (ref 0.2–1.3)
BUN SERPL-MCNC: 13 MG/DL (ref 7–30)
CALCIUM SERPL-MCNC: 8.9 MG/DL (ref 8.5–10.1)
CHLORIDE BLD-SCNC: 107 MMOL/L (ref 94–109)
CO2 SERPL-SCNC: 28 MMOL/L (ref 20–32)
CREAT SERPL-MCNC: 0.73 MG/DL (ref 0.52–1.04)
GFR SERPL CREATININE-BSD FRML MDRD: >90 ML/MIN/1.73M2
GLUCOSE BLD-MCNC: 102 MG/DL (ref 70–99)
POTASSIUM BLD-SCNC: 3.8 MMOL/L (ref 3.4–5.3)
PROT SERPL-MCNC: 7.2 G/DL (ref 6.8–8.8)
SODIUM SERPL-SCNC: 139 MMOL/L (ref 133–144)

## 2022-07-21 LAB — TSH SERPL DL<=0.005 MIU/L-ACNC: 1.16 MU/L (ref 0.4–4)

## 2022-07-22 LAB
BKR LAB AP GYN ADEQUACY: NORMAL
BKR LAB AP GYN INTERPRETATION: NORMAL
BKR LAB AP HPV REFLEX: NORMAL
BKR LAB AP PREVIOUS ABNORMAL: NORMAL
PATH REPORT.COMMENTS IMP SPEC: NORMAL
PATH REPORT.COMMENTS IMP SPEC: NORMAL
PATH REPORT.RELEVANT HX SPEC: NORMAL

## 2022-07-26 LAB
HUMAN PAPILLOMA VIRUS 16 DNA: POSITIVE
HUMAN PAPILLOMA VIRUS 18 DNA: NEGATIVE
HUMAN PAPILLOMA VIRUS FINAL DIAGNOSIS: ABNORMAL
HUMAN PAPILLOMA VIRUS OTHER HR: POSITIVE

## 2022-07-27 ENCOUNTER — PATIENT OUTREACH (OUTPATIENT)
Dept: FAMILY MEDICINE | Facility: CLINIC | Age: 57
End: 2022-07-27

## 2022-07-27 PROBLEM — R87.810 CERVICAL HIGH RISK HPV (HUMAN PAPILLOMAVIRUS) TEST POSITIVE: Status: ACTIVE | Noted: 2022-07-27

## 2022-08-08 ENCOUNTER — VIRTUAL VISIT (OUTPATIENT)
Dept: FAMILY MEDICINE | Facility: CLINIC | Age: 57
End: 2022-08-08
Payer: COMMERCIAL

## 2022-08-08 DIAGNOSIS — U07.1 INFECTION DUE TO 2019 NOVEL CORONAVIRUS: Primary | ICD-10-CM

## 2022-08-08 PROCEDURE — 99213 OFFICE O/P EST LOW 20 MIN: CPT | Mod: GT | Performed by: NURSE PRACTITIONER

## 2022-08-08 NOTE — PROGRESS NOTES
Prachi is a 57 year old who is being evaluated via a billable video visit.      How would you like to obtain your AVS?   If the video visit is dropped, the invitation should be resent by:   Will anyone else be joining your video visit?         Assessment & Plan     (U07.1) Infection due to 2019 novel coronavirus  (primary encounter diagnosis)  Comment:   Plan: nirmatrelvir and ritonavir (PAXLOVID) therapy         pack        Discussed the use and indication of this medication as well as potential side effects.   Discussed symptomatic treatment measures.        23 minutes spent on the date of the encounter doing chart review, patient visit and documentation            No follow-ups on file.    Janey Aguirre NP  [facility]   Prachi is a 57 year old, presenting for the following health issues:  covid treatment     HPI     She really started not feeling well last Wednesday, had negative home tests, but got a positive test on Saturday.  She has a cough, nasal congestion, sore throat, fatigue, fever, myalgias.  No shortness of breath.    Risk factors: BMI>30, depression        Review of Systems         Objective           Vitals:  No vitals were obtained today due to virtual visit.    Physical Exam   GENERAL: Healthy, alert and no distress  EYES: Eyes grossly normal to inspection.  No discharge or erythema, or obvious scleral/conjunctival abnormalities.  RESP: No audible wheeze, cough, or visible cyanosis.  No visible retractions or increased work of breathing.    SKIN: Visible skin clear. No significant rash, abnormal pigmentation or lesions.  NEURO: Cranial nerves grossly intact.  Mentation and speech appropriate for age.  PSYCH: Mentation appears normal, affect normal/bright, judgement and insight intact, normal speech and appearance well-groomed.                Video-Visit Details    Video Start Time: 8:20 AM    Type of service:  Video Visit    Video End Time:8:38  MANPREET    Originating Location (pt. Location): Home    Distant Location (provider location):  [facility]     Platform used for Video Visit: Matty Orellana

## 2022-08-08 NOTE — PATIENT INSTRUCTIONS

## 2022-09-30 DIAGNOSIS — F41.1 GAD (GENERALIZED ANXIETY DISORDER): ICD-10-CM

## 2022-09-30 NOTE — TELEPHONE ENCOUNTER
Routing refill request to provider for review/approval because:  Drug not on the [facility] refill protocol     Last Written Prescription Date:  7/19/22  Last Fill Quantity: 90,  # refills: 0   Last office visit: 7/19/2022 with prescribing provider:  Bottem   Future Office Visit:      ROCAEL Harris RN  [facility]

## 2022-10-02 RX ORDER — GABAPENTIN 300 MG/1
CAPSULE ORAL
Qty: 90 CAPSULE | Refills: 3 | Status: SHIPPED | OUTPATIENT
Start: 2022-10-02 | End: 2023-02-08

## 2022-10-20 DIAGNOSIS — F41.1 GAD (GENERALIZED ANXIETY DISORDER): ICD-10-CM

## 2022-10-20 DIAGNOSIS — F33.1 MODERATE RECURRENT MAJOR DEPRESSION (H): ICD-10-CM

## 2022-10-24 ENCOUNTER — OFFICE VISIT (OUTPATIENT)
Dept: OBGYN | Facility: CLINIC | Age: 57
End: 2022-10-24
Payer: COMMERCIAL

## 2022-10-24 VITALS
HEART RATE: 77 BPM | TEMPERATURE: 97 F | WEIGHT: 184 LBS | SYSTOLIC BLOOD PRESSURE: 166 MMHG | DIASTOLIC BLOOD PRESSURE: 99 MMHG | BODY MASS INDEX: 32.59 KG/M2

## 2022-10-24 DIAGNOSIS — N84.1 CERVICAL POLYP: ICD-10-CM

## 2022-10-24 DIAGNOSIS — R87.810 CERVICAL HIGH RISK HPV (HUMAN PAPILLOMAVIRUS) TEST POSITIVE: Primary | ICD-10-CM

## 2022-10-24 LAB — HCG UR QL: NEGATIVE

## 2022-10-24 PROCEDURE — 88342 IMHCHEM/IMCYTCHM 1ST ANTB: CPT | Performed by: PATHOLOGY

## 2022-10-24 PROCEDURE — 81025 URINE PREGNANCY TEST: CPT | Performed by: OBSTETRICS & GYNECOLOGY

## 2022-10-24 PROCEDURE — 57454 BX/CURETT OF CERVIX W/SCOPE: CPT | Performed by: OBSTETRICS & GYNECOLOGY

## 2022-10-24 PROCEDURE — 88305 TISSUE EXAM BY PATHOLOGIST: CPT | Performed by: PATHOLOGY

## 2022-10-24 NOTE — PROGRESS NOTES
[facility]- OBGYN    CC:colposcopy    S:Iliana Mcfarlane is a 57 year old female  who presents for initial colposcopy, referred by Janey Aguirre. Pap smear on 2022 showed: normal and with high risk HPV present: +HPV16,+HPV other.     Pap Hx:  5/3/2010- NIL  12 NIL  10/14/14 NIL   17 NILM HPV negative  22 NILM HPV positive other HR types and type 16    No LMP recorded (lmp unknown). Patient is postmenopausal.   UPT today is negative    Patient does not smoke    Patient reports she is not sexually active  STD hx: HPV and HSV    OBGYN Hx:   OB History    Para Term  AB Living   12 2 2 0 4 2   SAB IAB Ectopic Multiple Live Births   4 0 0 0 6      # Outcome Date GA Lbr Oscar/2nd Weight Sex Delivery Anes PTL Lv   12 SAB 06     SAB   DEC   11 SAB 06 10w0d    SAB   DEC   10 Term 05 40w0d       KILEY   9 SAB 04     SAB   DEC   8 Term 02 40w0d       KILEY   7 SAB 10/01/01     SAB   DEC   6 Term      Vag-Spont   KILEY   5 Term            4 SAB            3 SAB            2 SAB            1 SAB                Menopause age 53 approx  PMH:   Past Medical History:   Diagnosis Date     Allergic rhinitis late      Allergic rhinitis, cause unspecified      Anxiety      Depression, major      Depressive disorder      Esophageal reflux      Genital herpes      Glaucoma      Hoarseness late      Lumbago      Menarche age 16     Menorrhagia      Nephrolithiasis      PONV (postoperative nausea and vomiting)      Reduced vision      Uncomplicated asthma     Mostly allergy- driven       PSH:  Past Surgical History:   Procedure Laterality Date     CATARACT IOL, RT/LT       COMBINED CYSTOSCOPY, RETROGRADES, URETEROSCOPY, LASER HOLMIUM LITHOTRIPSY URETER(S), INSERT STENT Right 2017    Procedure: COMBINED CYSTOSCOPY, RETROGRADES, URETEROSCOPY, LASER HOLMIUM LITHOTRIPSY URETER(S), INSERT STENT;  Cystoscopy, Right Ureteroscopy with  Holmium Laser Lithotripsy, Right Stent Placement;  Surgeon: Helio Lauren MD;  Location: [facility] OR     ENT SURGERY      tonsillectomy     EYE SURGERY      trabeculectomy     GI SURGERY      dairy intolerence - resolved     HEAD & NECK SURGERY      trabeculectomy, cataract, yag     LAPAROSCOPIC CHOLECYSTECTOMY N/A 2018    Procedure: LAPAROSCOPIC CHOLECYSTECTOMY;  Laparoscopic Cholecystectomy and open umbilical hernia repair;  Surgeon: Reggie Coffey MD;  Location: [facility] OR     OPERATIVE HYSTEROSCOPY WITH MORCELLATOR  2014    Procedure: OPERATIVE HYSTEROSCOPY WITH MORCELLATOR;  Surgeon: Maureen Tavarez MD;  Location: [facility] OR     SURGICAL HISTORY OF -       right eye trabeculectomy     TONSILLECTOMY       [facility] NONSPECIFIC PROCEDURE      Upper GI endoscopy     [facility] NONSPECIFIC PROCEDURE      Tonsillectomy     [facility] NONSPECIFIC PROCEDURE      Oral surgery, wisdom teeth     [facility] NONSPECIFIC PROCEDURE           [facility] NONSPECIFIC PROCEDURE      Hysteroscopy, ablation of uterine fibroid     [facility] NONSPECIFIC PROCEDURE      miscarriage       Meds:  Current Outpatient Medications   Medication     albuterol (PROAIR HFA/PROVENTIL HFA/VENTOLIN HFA) 108 (90 Base) MCG/ACT inhaler     Beclomethasone Dipropionate (QNASL) 80 MCG/ACT AERS Nasal Spray     CALCIUM-MAGNESIUM-ZINC PO     clindamycin (CLEOCIN) 150 MG capsule     gabapentin (NEURONTIN) 300 MG capsule     lifitegrast (XIIDRA) 5 % opthalmic solution     loratadine (CLARITIN) 10 MG tablet     Omega-3 Fatty Acids (OMEGA-3 FISH OIL PO)     omeprazole (PRILOSEC) 40 MG DR capsule     sertraline (ZOLOFT) 50 MG tablet     sodium chloride 0.9%, bottle, 0.9 % irrigation     zolpidem ER (AMBIEN CR) 12.5 MG CR tablet     No current facility-administered medications for this visit.       Allergies:  Allergies   Allergen Reactions     No Known Drug Allergies        O: Patient Vitals for the past 24 hrs:   BP Temp Pulse Weight   10/24/22 1125 (!)  166/99 97  F (36.1  C) 77 83.5 kg (184 lb)   ]    PROCEDURE:  Before the procedure, it was ensured that the patient was educated regarding the nature of her findings to date, the implications, and what was to be done. She has been made aware of the role of HPV, the natural history of infection, ways to minimize her future risk, the effect of HPV on the cervix, and treatment options available should they be indicated.  The details of the colposcopic procedure were reviewed. Indications, alternatives, benefits, and risks including bleeding, infection, pain, and injury to surrounding organs were reviewed.  Questions and concerns were addressed.  Consent was signed.     Medium yoon speculum placed in vagina and excellent visualization of cervix achieved, cervix swabbed x 3 with acetic acid solution.  Cervix noted to have endocervical polyp protruding.  Small area of acetowhite change and hypervascularity at 3 oclock.  Lugol's applied showing diffuse decreased uptake over entire cervix with few patches of normal uptake.  Cervical biopsy taken at 3 olcock.  Cervical polyp grasped with polyp forceps and removed.  Endocervical currettage collected.  Cervix made hemostatic with silver nitrate sticks.  Speculum removed. Patient tolerated procedure well.    FINDINGS:  Cervix: Cervix noted to have endocervical polyp protruding.  Small area of acetowhite change and hypervascularity at 3 oclock.  Lugol's applied showing diffuse decreased uptake over entire cervix with few patches of normal uptake.    Pap repeated?:  No  SCJ seen?:  no    ECC done?:  Yes   Lugol's solution used?:  Yes   Satisfactory examination?:  no      ASSESSMENT: HPV related changes.    PLAN: specimens labelled and sent to Pathology, will base further treatment on Pathology findings, treatment options discussed with patient and post biopsy instructions given to patient    Neela Gunter MD

## 2022-10-24 NOTE — TELEPHONE ENCOUNTER
Routing refill request to provider for review/approval because:  --PHQ-9 >4.    --Last visit:  7/19/22 Bottem     --Future Visit: none with FP.    --Last Written Prescription Date:     Disp Refills Start End SOPHIA   sertraline (ZOLOFT) 50 MG tablet 30 tablet 5 7/19/2022  --   Sig - Route: Take 1 tablet (50 mg) by mouth daily          PHQ 3/8/2022 5/16/2022 5/16/2022   PHQ-9 Total Score 9 6 6   Q9: Thoughts of better off dead/self-harm past 2 weeks Not at all Not at all Not at all

## 2022-10-27 LAB
PATH REPORT.COMMENTS IMP SPEC: NORMAL
PATH REPORT.COMMENTS IMP SPEC: NORMAL
PATH REPORT.FINAL DX SPEC: NORMAL
PATH REPORT.GROSS SPEC: NORMAL
PATH REPORT.MICROSCOPIC SPEC OTHER STN: NORMAL
PATH REPORT.MICROSCOPIC SPEC OTHER STN: NORMAL
PATH REPORT.RELEVANT HX SPEC: NORMAL
PHOTO IMAGE: NORMAL

## 2022-10-29 ENCOUNTER — HEALTH MAINTENANCE LETTER (OUTPATIENT)
Age: 57
End: 2022-10-29

## 2022-11-03 DIAGNOSIS — J45.20 MILD INTERMITTENT ASTHMA WITHOUT COMPLICATION: ICD-10-CM

## 2022-11-07 ENCOUNTER — PATIENT OUTREACH (OUTPATIENT)
Dept: OBGYN | Facility: CLINIC | Age: 57
End: 2022-11-07

## 2022-11-07 DIAGNOSIS — R87.810 CERVICAL HIGH RISK HPV (HUMAN PAPILLOMAVIRUS) TEST POSITIVE: Primary | ICD-10-CM

## 2022-11-07 RX ORDER — ALBUTEROL SULFATE 90 UG/1
AEROSOL, METERED RESPIRATORY (INHALATION)
Qty: 18 G | Refills: 1 | Status: SHIPPED | OUTPATIENT
Start: 2022-11-07 | End: 2023-01-04

## 2022-11-07 NOTE — TELEPHONE ENCOUNTER
---Prescription approved per [facility] [place] [facility] to the [facility] Refill Protocol.       Prisca Zaldivar RN BSN     [facility]          --Last visit:  8/8/2022     --Future Visit: none.        ACT Total Scores 9/15/2021 7/19/2022   ACT TOTAL SCORE (Goal Greater than or Equal to 20) 23 24   In the past 12 months, how many times did you visit the emergency room for your asthma without being admitted to the hospital? 0 0   In the past 12 months, how many times were you hospitalized overnight because of your asthma? 0 0

## 2022-12-14 ENCOUNTER — TRANSFERRED RECORDS (OUTPATIENT)
Dept: HEALTH INFORMATION MANAGEMENT | Facility: CLINIC | Age: 57
End: 2022-12-14

## 2023-02-08 ENCOUNTER — OFFICE VISIT (OUTPATIENT)
Dept: FAMILY MEDICINE | Facility: CLINIC | Age: 58
End: 2023-02-08
Payer: COMMERCIAL

## 2023-02-08 VITALS
HEART RATE: 83 BPM | WEIGHT: 178 LBS | TEMPERATURE: 97.6 F | RESPIRATION RATE: 15 BRPM | HEIGHT: 64 IN | DIASTOLIC BLOOD PRESSURE: 82 MMHG | SYSTOLIC BLOOD PRESSURE: 115 MMHG | BODY MASS INDEX: 30.39 KG/M2 | OXYGEN SATURATION: 95 %

## 2023-02-08 DIAGNOSIS — Z12.11 SCREEN FOR COLON CANCER: ICD-10-CM

## 2023-02-08 DIAGNOSIS — Z12.31 VISIT FOR SCREENING MAMMOGRAM: ICD-10-CM

## 2023-02-08 DIAGNOSIS — F33.1 MODERATE RECURRENT MAJOR DEPRESSION (H): ICD-10-CM

## 2023-02-08 DIAGNOSIS — F41.1 GAD (GENERALIZED ANXIETY DISORDER): ICD-10-CM

## 2023-02-08 DIAGNOSIS — J45.30 MILD PERSISTENT ASTHMA WITHOUT COMPLICATION: ICD-10-CM

## 2023-02-08 DIAGNOSIS — Z00.00 ROUTINE GENERAL MEDICAL EXAMINATION AT A HEALTH CARE FACILITY: Primary | ICD-10-CM

## 2023-02-08 DIAGNOSIS — R73.03 PREDIABETES: ICD-10-CM

## 2023-02-08 LAB
CHOLEST SERPL-MCNC: 285 MG/DL
FASTING STATUS PATIENT QL REPORTED: ABNORMAL
GLUCOSE SERPL-MCNC: 102 MG/DL (ref 70–99)
HBA1C MFR BLD: 5.8 % (ref 0–5.6)
HDLC SERPL-MCNC: 42 MG/DL
LDLC SERPL CALC-MCNC: 168 MG/DL
NONHDLC SERPL-MCNC: 243 MG/DL
TRIGL SERPL-MCNC: 373 MG/DL

## 2023-02-08 PROCEDURE — 90677 PCV20 VACCINE IM: CPT | Performed by: NURSE PRACTITIONER

## 2023-02-08 PROCEDURE — 90746 HEPB VACCINE 3 DOSE ADULT IM: CPT | Performed by: NURSE PRACTITIONER

## 2023-02-08 PROCEDURE — 90472 IMMUNIZATION ADMIN EACH ADD: CPT | Performed by: NURSE PRACTITIONER

## 2023-02-08 PROCEDURE — 90682 RIV4 VACC RECOMBINANT DNA IM: CPT | Performed by: NURSE PRACTITIONER

## 2023-02-08 PROCEDURE — 36415 COLL VENOUS BLD VENIPUNCTURE: CPT | Performed by: NURSE PRACTITIONER

## 2023-02-08 PROCEDURE — 99214 OFFICE O/P EST MOD 30 MIN: CPT | Mod: 25 | Performed by: NURSE PRACTITIONER

## 2023-02-08 PROCEDURE — 83036 HEMOGLOBIN GLYCOSYLATED A1C: CPT | Performed by: NURSE PRACTITIONER

## 2023-02-08 PROCEDURE — 90471 IMMUNIZATION ADMIN: CPT | Performed by: NURSE PRACTITIONER

## 2023-02-08 PROCEDURE — 82947 ASSAY GLUCOSE BLOOD QUANT: CPT | Performed by: NURSE PRACTITIONER

## 2023-02-08 PROCEDURE — 80061 LIPID PANEL: CPT | Performed by: NURSE PRACTITIONER

## 2023-02-08 PROCEDURE — 99396 PREV VISIT EST AGE 40-64: CPT | Mod: 25 | Performed by: NURSE PRACTITIONER

## 2023-02-08 RX ORDER — GABAPENTIN 300 MG/1
CAPSULE ORAL
Qty: 90 CAPSULE | Refills: 3 | Status: SHIPPED | OUTPATIENT
Start: 2023-02-08 | End: 2023-09-18

## 2023-02-08 ASSESSMENT — ASTHMA QUESTIONNAIRES
QUESTION_3 LAST FOUR WEEKS HOW OFTEN DID YOUR ASTHMA SYMPTOMS (WHEEZING, COUGHING, SHORTNESS OF BREATH, CHEST TIGHTNESS OR PAIN) WAKE YOU UP AT NIGHT OR EARLIER THAN USUAL IN THE MORNING: NOT AT ALL
QUESTION_5 LAST FOUR WEEKS HOW WOULD YOU RATE YOUR ASTHMA CONTROL: WELL CONTROLLED
ACT_TOTALSCORE: 20
QUESTION_4 LAST FOUR WEEKS HOW OFTEN HAVE YOU USED YOUR RESCUE INHALER OR NEBULIZER MEDICATION (SUCH AS ALBUTEROL): TWO OR THREE TIMES PER WEEK
QUESTION_2 LAST FOUR WEEKS HOW OFTEN HAVE YOU HAD SHORTNESS OF BREATH: ONCE OR TWICE A WEEK
QUESTION_1 LAST FOUR WEEKS HOW MUCH OF THE TIME DID YOUR ASTHMA KEEP YOU FROM GETTING AS MUCH DONE AT WORK, SCHOOL OR AT HOME: A LITTLE OF THE TIME
ACT_TOTALSCORE: 20

## 2023-02-08 ASSESSMENT — PATIENT HEALTH QUESTIONNAIRE - PHQ9
SUM OF ALL RESPONSES TO PHQ QUESTIONS 1-9: 3
10. IF YOU CHECKED OFF ANY PROBLEMS, HOW DIFFICULT HAVE THESE PROBLEMS MADE IT FOR YOU TO DO YOUR WORK, TAKE CARE OF THINGS AT HOME, OR GET ALONG WITH OTHER PEOPLE: SOMEWHAT DIFFICULT
SUM OF ALL RESPONSES TO PHQ QUESTIONS 1-9: 3
SUM OF ALL RESPONSES TO PHQ QUESTIONS 1-9: 3

## 2023-02-08 ASSESSMENT — PAIN SCALES - GENERAL: PAINLEVEL: NO PAIN (0)

## 2023-02-08 NOTE — NURSING NOTE
Prior to immunization administration, verified patients identity using patient s name and date of birth. Please see Immunization Activity for additional information.     Screening Questionnaire for Adult Immunization    Are you sick today?   No   Do you have allergies to medications, food, a vaccine component or latex?   No   Have you ever had a serious reaction after receiving a vaccination?   No   Do you have a long-term health problem with heart, lung, kidney, or metabolic disease (e.g., diabetes), asthma, a blood disorder, no spleen, complement component deficiency, a cochlear implant, or a spinal fluid leak?  Are you on long-term aspirin therapy?   No   Do you have cancer, leukemia, HIV/AIDS, or any other immune system problem?   No   Do you have a parent, brother, or sister with an immune system problem?   No   In the past 3 months, have you taken medications that affect  your immune system, such as prednisone, other steroids, or anticancer drugs; drugs for the treatment of rheumatoid arthritis, Crohn s disease, or psoriasis; or have you had radiation treatments?   No   Have you had a seizure, or a brain or other nervous system problem?   No   During the past year, have you received a transfusion of blood or blood    products, or been given immune (gamma) globulin or antiviral drug?   No   For women: Are you pregnant or is there a chance you could become       pregnant during the next month?   No   Have you received any vaccinations in the past 4 weeks?   No     Immunization questionnaire answers were all negative.        Per orders of Dr. Aguirre, injection of Flu, Hep B & PCV 20 given by Anny Trinidad MA. Patient instructed to remain in clinic for 15 minutes afterwards, and to report any adverse reaction to me immediately.       Screening performed by Anny Trinidad MA on 2/8/2023 at 10:28 AM.

## 2023-02-08 NOTE — PROGRESS NOTES
SUBJECTIVE:   CC: Prachi is an 58 year old who presents for preventive health visit.   Patient has been advised of split billing requirements and indicates understanding: Yes  History of Present Illness       Reason for visit:  Annual physical    She eats 2-3 servings of fruits and vegetables daily.She consumes 0 sweetened beverage(s) daily.She exercises with enough effort to increase her heart rate 10 to 19 minutes per day.  She exercises with enough effort to increase her heart rate 5 days per week.   She is taking medications regularly.    Today's PHQ-9         PHQ-9 Total Score: 3    PHQ-9 Q9 Thoughts of better off dead/self-harm past 2 weeks :   Not at all    How difficult have these problems made it for you to do your work, take care of things at home, or get along with other people: Somewhat difficult    The 10-year ASCVD risk score (Edison BARRIOS, et al., 2019) is: 2.3%    Values used to calculate the score:      Age: 58 years      Sex: Female      Is Non- : No      Diabetic: No      Tobacco smoker: No      Systolic Blood Pressure: 115 mmHg      Is BP treated: No      HDL Cholesterol: 67 mg/dL      Total Cholesterol: 304 mg/dL      Today's PHQ-2 Score:   PHQ-2 ( 1999 [employer]) 7/19/2022   Q1: Little interest or pleasure in doing things 0   Q2: Feeling down, depressed or hopeless 0   PHQ-2 Score 0   PHQ-2 Total Score (12-17 Years)- Positive if 3 or more points; Administer PHQ-A if positive -   Q1: Little interest or pleasure in doing things Not at all   Q2: Feeling down, depressed or hopeless Not at all   PHQ-2 Score 0       She is doing well on her current dose of Sertraline and denies any side effects.  Mood is stable.     She has had frequent URIs in the past year.  Feels like she has some chest tightness that persists, albuterol does help.  She does have an official asthma diagnosis from the specialist.  She is not currently on an inhaled steroid.        Social History     Tobacco Use  "    Smoking status: Never     Smokeless tobacco: Never   Substance Use Topics     Alcohol use: Not Currently     Comment: minimal     If you drink alcohol do you typically have >3 drinks per day or >7 drinks per week? No    No flowsheet data found.    Reviewed orders with patient.  Reviewed health maintenance and updated orders accordingly - Yes      Breast Cancer Screening:    FHS-7:   Breast CA Risk Assessment (FHS-7) 2/9/2021 7/19/2022   Did any of your first-degree relatives have breast or ovarian cancer? Yes No   Did any of your relatives have bilateral breast cancer? Yes Unknown   Did any man in your family have breast cancer? No No   Did any woman in your family have breast and ovarian cancer? Yes No   Did any woman in your family have breast cancer before age 50 y? No No   Do you have 2 or more relatives with breast and/or ovarian cancer? No No   Do you have 2 or more relatives with breast and/or bowel cancer? Yes No         Pertinent mammograms are reviewed under the imaging tab.    History of abnormal Pap smear: YES - updated in Problem List and Health Maintenance accordingly  PAP / HPV Latest Ref Rng & Units 7/19/2022 7/19/2017 10/14/2014   PAP   Negative for Intraepithelial Lesion or Malignancy (NILM) - -   PAP (Historical) - - NIL NIL   HPV16 Negative Positive(A) Negative -   HPV18 Negative Negative Negative -   HRHPV Negative Positive(A) Negative -     Reviewed and updated as needed this visit by clinical staff   Tobacco  Allergies  Meds  Problems  Med Hx  Surg Hx  Fam Hx          Reviewed and updated as needed this visit by Provider   Tobacco  Allergies  Meds  Problems  Med Hx  Surg Hx  Fam Hx             Review of Systems       OBJECTIVE:   /82 (BP Location: Left arm, Patient Position: Sitting, Cuff Size: Adult Large)   Pulse 83   Temp 97.6  F (36.4  C) (Temporal)   Resp 15   Ht 1.613 m (5' 3.5\")   Wt 80.7 kg (178 lb)   LMP  (LMP Unknown)   SpO2 95%   BMI 31.04 kg/m  "   Physical Exam  GENERAL: healthy, alert and no distress  EYES: Eyes grossly normal to inspection, PERRL and conjunctivae and sclerae normal  HENT: ear canals and TM's normal, nose and mouth without ulcers or lesions  NECK: no adenopathy, no asymmetry, masses, or scars and thyroid normal to palpation  RESP: lungs clear to auscultation - no rales, rhonchi or wheezes  BREAST: normal without masses, tenderness or nipple discharge and no palpable axillary masses or adenopathy  CV: regular rate and rhythm, normal S1 S2, no S3 or S4, no murmur, click or rub, no peripheral edema and peripheral pulses strong  ABDOMEN: soft, nontender, no hepatosplenomegaly, no masses and bowel sounds normal  MS: no gross musculoskeletal defects noted, no edema  SKIN: no suspicious lesions or rashes  NEURO: Normal strength and tone, mentation intact and speech normal  PSYCH: mentation appears normal, affect normal/bright        ASSESSMENT/PLAN:   (Z00.00) Routine general medical examination at a health care facility  (primary encounter diagnosis)  Comment:   Plan: Lipid panel reflex to direct LDL Fasting,         Glucose            (Z12.11) Screen for colon cancer  Comment:   Plan: Colonoscopy Screening  Referral            (Z12.31) Visit for screening mammogram  Comment:   Plan: MA SCREENING DIGITAL BILAT - Future  (s+30)            (F41.1) KANIKA (generalized anxiety disorder)  Comment: stable  Plan: sertraline (ZOLOFT) 50 MG tablet, gabapentin         (NEURONTIN) 300 MG capsule        The current medical regimen is effective;  continue present plan and medications.     (F33.1) Moderate recurrent major depression (H)  Comment: stable  Plan: sertraline (ZOLOFT) 50 MG tablet        The current medical regimen is effective;  continue present plan and medications.     (J45.30) Mild persistent asthma without complication  Comment: not optimally controlled  Plan: budesonide (PULMICORT FLEXHALER) 90 MCG/ACT         inhaler        Will start  "on Pulmicort     (R73.03) Prediabetes  Comment:   Plan: Hemoglobin A1c        Discussed diet, exercise, weight loss.             COUNSELING:  Reviewed preventive health counseling, as reflected in patient instructions      BMI:   Estimated body mass index is 31.04 kg/m  as calculated from the following:    Height as of this encounter: 1.613 m (5' 3.5\").    Weight as of this encounter: 80.7 kg (178 lb).   Weight management plan: Discussed healthy diet and exercise guidelines      She reports that she has never smoked. She has never used smokeless tobacco.          Janey Aguirre NP  [facility]  "

## 2023-04-25 ENCOUNTER — MYC MEDICAL ADVICE (OUTPATIENT)
Dept: FAMILY MEDICINE | Facility: CLINIC | Age: 58
End: 2023-04-25
Payer: COMMERCIAL

## 2023-04-25 NOTE — TELEPHONE ENCOUNTER
Nathalie Aguirre --    Please review and advise.     Patient Message Kevenhart:  I have had a cough now since late January. It s not terrible now, but I have had an extra sore throat and swollen lymph glands the past week again. (I ve believed it was still be mostly GERD and post-nasal drip causing otherwise)  Last week, though, my kids p grandma was hospitalized with bactericemia that they found to be Streptococcus pneumonia. Is this something I should get tested for via nurse-only? Or what would you advise?    Nathalie - here is your visit note from 2/8/2023:   She has had frequent URIs in the past year.  Feels like she has some chest tightness that persists, albuterol does help.  She does have an official asthma diagnosis from the specialist.  She is not currently on an inhaled steroid.     Thank you,  Alva Pop, BSN RN  [facility]

## 2023-04-26 NOTE — TELEPHONE ENCOUNTER
She could have an element of seasonal allergies right now.  No need for any testing.  If she has symptoms of pneumonia - fever, worsening cough, shortness of breath, she should be seen.

## 2023-06-01 ENCOUNTER — HEALTH MAINTENANCE LETTER (OUTPATIENT)
Age: 58
End: 2023-06-01

## 2023-06-22 ENCOUNTER — HOSPITAL ENCOUNTER (OUTPATIENT)
Dept: MAMMOGRAPHY | Facility: CLINIC | Age: 58
Discharge: HOME | End: 2023-06-22
Attending: NURSE PRACTITIONER | Admitting: NURSE PRACTITIONER
Payer: COMMERCIAL

## 2023-06-22 DIAGNOSIS — Z12.31 VISIT FOR SCREENING MAMMOGRAM: ICD-10-CM

## 2023-06-22 PROCEDURE — 77067 SCR MAMMO BI INCL CAD: CPT

## 2023-08-03 ENCOUNTER — TRANSFERRED RECORDS (OUTPATIENT)
Dept: HEALTH INFORMATION MANAGEMENT | Facility: CLINIC | Age: 58
End: 2023-08-03
Payer: COMMERCIAL

## 2023-09-11 ENCOUNTER — MYC MEDICAL ADVICE (OUTPATIENT)
Dept: FAMILY MEDICINE | Facility: CLINIC | Age: 58
End: 2023-09-11
Payer: COMMERCIAL

## 2023-09-11 ASSESSMENT — ASTHMA QUESTIONNAIRES: ACT_TOTALSCORE: 20

## 2023-09-12 NOTE — TELEPHONE ENCOUNTER
Nathalie -- See BP update from patient attached to their message. Most recent reading of 151/95 entered in chart.    Shireen Orlando RN  [facility]

## 2023-09-13 NOTE — TELEPHONE ENCOUNTER
Pt is already scheduled for 9/18/23  Soundsupply message sent to patient.    Teresa SIMMS RN  [facility]

## 2023-09-18 ENCOUNTER — VIRTUAL VISIT (OUTPATIENT)
Dept: FAMILY MEDICINE | Facility: CLINIC | Age: 58
End: 2023-09-18
Payer: COMMERCIAL

## 2023-09-18 DIAGNOSIS — I10 BENIGN ESSENTIAL HYPERTENSION: Primary | ICD-10-CM

## 2023-09-18 PROCEDURE — 99214 OFFICE O/P EST MOD 30 MIN: CPT | Mod: VID | Performed by: NURSE PRACTITIONER

## 2023-09-18 RX ORDER — ZOLPIDEM TARTRATE 12.5 MG/1
12.5 TABLET, FILM COATED, EXTENDED RELEASE ORAL
Qty: 90 TABLET | Refills: 1 | Status: CANCELLED | OUTPATIENT
Start: 2023-09-18

## 2023-09-18 RX ORDER — OMEPRAZOLE 40 MG/1
CAPSULE, DELAYED RELEASE ORAL
Qty: 90 CAPSULE | Status: CANCELLED | OUTPATIENT
Start: 2023-09-18

## 2023-09-18 RX ORDER — LOSARTAN POTASSIUM 50 MG/1
50 TABLET ORAL DAILY
Qty: 30 TABLET | Refills: 1 | Status: SHIPPED | OUTPATIENT
Start: 2023-09-18 | End: 2023-10-23

## 2023-09-18 ASSESSMENT — PATIENT HEALTH QUESTIONNAIRE - PHQ9
10. IF YOU CHECKED OFF ANY PROBLEMS, HOW DIFFICULT HAVE THESE PROBLEMS MADE IT FOR YOU TO DO YOUR WORK, TAKE CARE OF THINGS AT HOME, OR GET ALONG WITH OTHER PEOPLE: NOT DIFFICULT AT ALL
SUM OF ALL RESPONSES TO PHQ QUESTIONS 1-9: 0
SUM OF ALL RESPONSES TO PHQ QUESTIONS 1-9: 0

## 2023-09-18 NOTE — PROGRESS NOTES
"rPachi is a 58 year old who is being evaluated via a billable video visit.      How would you like to obtain your AVS? MyChart  If the video visit is dropped, the invitation should be resent by: Text to cell phone: [phone]  Will anyone else be joining your video visit? No          Assessment & Plan     Benign essential hypertension  New diagnosis  - losartan (COZAAR) 50 MG tablet; Take 1 tablet (50 mg) by mouth daily  Will start Losartan.  Discussed the use and indication of this medication as well as potential side effects.   Follow up in one month, also needs repeat pap.      I spent a total of 35 minutes on the day of the visit.   Time spent by me doing chart review, history and exam, documentation and further activities per the note       BMI:   Estimated body mass index is 31.04 kg/m  as calculated from the following:    Height as of 2/8/23: 1.613 m (5' 3.5\").    Weight as of 2/8/23: 80.7 kg (178 lb).           Janey Aguirre NP  [facility]    Subjective   Prachi is a 58 year old, presenting for the following health issues:  Hypertension, Lipids, and Results (Colonoscopy)      9/18/2023     4:25 PM   Additional Questions   Roomed by Fay HYMAN RN   Accompanied by None         9/18/2023     4:25 PM   Patient Reported Additional Medications   Patient reports taking the following new medications Mela-Tumeric, antibiotic eye drop (Unknown name)       History of Present Illness       Hyperlipidemia:  She presents for follow up of hyperlipidemia.   She is not taking medication to lower cholesterol. She is not having myalgia or other side effects to statin medications.    Hypertension: She presents for follow up of hypertension.  She does check blood pressure  regularly outside of the clinic. Outside blood pressures have been over 140/90. She follows a low salt diet.     Reason for visit:  Check on vaccinnations for travel and bp/cholesterol progress    She eats 4 or more servings " "of fruits and vegetables daily.She consumes 0 sweetened beverage(s) daily.She exercises with enough effort to increase her heart rate 30 to 60 minutes per day.  She exercises with enough effort to increase her heart rate 6 days per week.   She is taking medications regularly.     She is using a fitness tracker ring and [employer] for monitoring her blood pressure.  Blood pressure has been elevated, most readings have been   She is trying to eat healthier, exercise and try to lose weight.  She has lost about 10#    She has an appointment this fall with the sleep clinic.             Review of Systems         Objective    Vitals - Patient Reported  Weight (Patient Reported): 80.7 kg (178 lb)  Height (Patient Reported): 162.6 cm (5' 4\")  BMI (Based on Pt Reported Ht/Wt): 30.55        Physical Exam   GENERAL: Healthy, alert and no distress  EYES: Eyes grossly normal to inspection.  No discharge or erythema, or obvious scleral/conjunctival abnormalities.  RESP: No audible wheeze, cough, or visible cyanosis.  No visible retractions or increased work of breathing.    SKIN: Visible skin clear. No significant rash, abnormal pigmentation or lesions.  NEURO: Cranial nerves grossly intact.  Mentation and speech appropriate for age.  PSYCH: Mentation appears normal, affect normal/bright, judgement and insight intact, normal speech and appearance well-groomed.                Video-Visit Details    Type of service:  Video Visit     Originating Location (pt. Location): Home    Distant Location (provider location):  On-site  Platform used for Video Visit: Matty"

## 2023-09-19 DIAGNOSIS — I10 BENIGN ESSENTIAL HYPERTENSION: ICD-10-CM

## 2023-09-19 RX ORDER — LOSARTAN POTASSIUM 50 MG/1
50 TABLET ORAL DAILY
Qty: 90 TABLET | OUTPATIENT
Start: 2023-09-19

## 2023-10-23 ENCOUNTER — OFFICE VISIT (OUTPATIENT)
Dept: FAMILY MEDICINE | Facility: CLINIC | Age: 58
End: 2023-10-23
Payer: COMMERCIAL

## 2023-10-23 VITALS
WEIGHT: 164.3 LBS | TEMPERATURE: 97.8 F | SYSTOLIC BLOOD PRESSURE: 126 MMHG | RESPIRATION RATE: 16 BRPM | BODY MASS INDEX: 29.11 KG/M2 | OXYGEN SATURATION: 97 % | HEART RATE: 76 BPM | HEIGHT: 63 IN | DIASTOLIC BLOOD PRESSURE: 74 MMHG

## 2023-10-23 DIAGNOSIS — Z12.4 CERVICAL CANCER SCREENING: Primary | ICD-10-CM

## 2023-10-23 DIAGNOSIS — E78.2 MIXED HYPERLIPIDEMIA: ICD-10-CM

## 2023-10-23 DIAGNOSIS — I10 BENIGN ESSENTIAL HYPERTENSION: ICD-10-CM

## 2023-10-23 DIAGNOSIS — F33.1 MODERATE RECURRENT MAJOR DEPRESSION (H): ICD-10-CM

## 2023-10-23 DIAGNOSIS — F41.1 GAD (GENERALIZED ANXIETY DISORDER): ICD-10-CM

## 2023-10-23 PROCEDURE — 80048 BASIC METABOLIC PNL TOTAL CA: CPT | Performed by: NURSE PRACTITIONER

## 2023-10-23 PROCEDURE — 36415 COLL VENOUS BLD VENIPUNCTURE: CPT | Performed by: NURSE PRACTITIONER

## 2023-10-23 PROCEDURE — 87624 HPV HI-RISK TYP POOLED RSLT: CPT | Performed by: NURSE PRACTITIONER

## 2023-10-23 PROCEDURE — G0145 SCR C/V CYTO,THINLAYER,RESCR: HCPCS | Performed by: NURSE PRACTITIONER

## 2023-10-23 PROCEDURE — 80061 LIPID PANEL: CPT | Performed by: NURSE PRACTITIONER

## 2023-10-23 PROCEDURE — 99214 OFFICE O/P EST MOD 30 MIN: CPT | Performed by: NURSE PRACTITIONER

## 2023-10-23 RX ORDER — LOSARTAN POTASSIUM 50 MG/1
50 TABLET ORAL DAILY
Qty: 90 TABLET | Refills: 3 | Status: SHIPPED | OUTPATIENT
Start: 2023-10-23

## 2023-10-23 ASSESSMENT — PAIN SCALES - GENERAL: PAINLEVEL: NO PAIN (0)

## 2023-10-23 ASSESSMENT — ENCOUNTER SYMPTOMS: HYPERTENSION: 1

## 2023-10-23 NOTE — PROGRESS NOTES
"  Assessment & Plan     (Z12.4) Cervical cancer screening  (primary encounter diagnosis)  Comment:   Plan: Pap Screen with HPV - recommended age 30 - 65         years            (I10) Benign essential hypertension  Comment: at goal  Plan: losartan (COZAAR) 50 MG tablet, Basic metabolic        panel  (Ca, Cl, CO2, Creat, Gluc, K, Na, BUN)        The current medical regimen is effective;  continue present plan and medications.     (F41.1) KANIKA (generalized anxiety disorder)  Comment: stable  Plan: sertraline (ZOLOFT) 50 MG tablet        The current medical regimen is effective;  continue present plan and medications.     (F33.1) Moderate recurrent major depression (H)  Comment: stable  Plan: sertraline (ZOLOFT) 50 MG tablet        The current medical regimen is effective;  continue present plan and medications.     (E78.2) Mixed hyperlipidemia  Comment:   Plan: Lipid panel reflex to direct LDL Non-fasting              I spent a total of 30 minutes on the day of the visit.   Time spent by me doing chart review, history and exam, documentation and further activities per the note       BMI:   Estimated body mass index is 29.15 kg/m  as calculated from the following:    Height as of this encounter: 1.599 m (5' 2.95\").    Weight as of this encounter: 74.5 kg (164 lb 4.8 oz).           Janey Aguirre NP  [facility]    Nelda   Prachi is a 58 year old, presenting for the following health issues:  Recheck Medication, Repeat Pap Smear, and Hypertension      10/23/2023     4:19 PM   Additional Questions   Roomed by Fay HYMAN RN   Accompanied by None       Hypertension     History of Present Illness     Asthma:  She presents for follow up of asthma.  She has some cough, no wheezing, and no shortness of breath.  She is using a relief medication a few times a month. She typically misses taking her controller medication 1 time(s) per week. Patient is aware of the following triggers: gastric " "reflux, humidity, pollens, smoke, strong odors and fumes and upper respiratory infections. The patient has not had a visit to the Emergency Room, Urgent Care or Hospital due to asthma since the last clinic visit.     Diabetes:   She presents for follow up of diabetes.    She is not checking blood glucose.         She has no concerns regarding her diabetes at this time.   She is not experiencing numbness or burning in feet, excessive thirst, blurry vision, weight changes or redness, sores or blisters on feet.           Hyperlipidemia:  She presents for follow up of hyperlipidemia.   She is not taking medication to lower cholesterol. She is not having myalgia or other side effects to statin medications.    Hypertension: She presents for follow up of hypertension.  She does check blood pressure  regularly outside of the clinic. Outside blood pressures have been over 140/90. She follows a low salt diet.     She eats 4 or more servings of fruits and vegetables daily.She consumes 0 sweetened beverage(s) daily.She exercises with enough effort to increase her heart rate 60 or more minutes per day.  She exercises with enough effort to increase her heart rate 6 days per week.   She is taking medications regularly.     She started on Losartan about 5 weeks ago and is doing well, denies any side effects.      She had a positive HR HPV pap last year, followed by a normal colposcopy.         Review of Systems         Objective    /74 (BP Location: Right arm, Patient Position: Sitting, Cuff Size: Adult Regular)   Pulse 76   Temp 97.8  F (36.6  C) (Temporal)   Resp 16   Ht 1.599 m (5' 2.95\")   Wt 74.5 kg (164 lb 4.8 oz)   LMP  (LMP Unknown)   SpO2 97%   BMI 29.15 kg/m    Body mass index is 29.15 kg/m .  Physical Exam   GENERAL: healthy, alert and no distress   (female): normal female external genitalia, normal urethral meatus, vaginal mucosa, normal cervix/adnexa/uterus without masses or discharge  MS: no gross " musculoskeletal defects noted, no edema  PSYCH: mentation appears normal, affect normal/bright

## 2023-10-24 LAB
ANION GAP SERPL CALCULATED.3IONS-SCNC: 11 MMOL/L (ref 7–15)
BUN SERPL-MCNC: 12.3 MG/DL (ref 6–20)
CALCIUM SERPL-MCNC: 9.1 MG/DL (ref 8.6–10)
CHLORIDE SERPL-SCNC: 104 MMOL/L (ref 98–107)
CHOLEST SERPL-MCNC: 243 MG/DL
CREAT SERPL-MCNC: 0.63 MG/DL (ref 0.51–0.95)
DEPRECATED HCO3 PLAS-SCNC: 23 MMOL/L (ref 22–29)
EGFRCR SERPLBLD CKD-EPI 2021: >90 ML/MIN/1.73M2
GLUCOSE SERPL-MCNC: 97 MG/DL (ref 70–99)
HDLC SERPL-MCNC: 48 MG/DL
LDLC SERPL CALC-MCNC: 149 MG/DL
NONHDLC SERPL-MCNC: 195 MG/DL
POTASSIUM SERPL-SCNC: 3.7 MMOL/L (ref 3.4–5.3)
SODIUM SERPL-SCNC: 138 MMOL/L (ref 135–145)
TRIGL SERPL-MCNC: 229 MG/DL

## 2023-10-26 LAB
BKR LAB AP GYN ADEQUACY: NORMAL
BKR LAB AP GYN INTERPRETATION: NORMAL
BKR LAB AP HPV REFLEX: NORMAL
BKR LAB AP PREVIOUS ABNL DX: NORMAL
BKR LAB AP PREVIOUS ABNORMAL: NORMAL
PATH REPORT.COMMENTS IMP SPEC: NORMAL
PATH REPORT.COMMENTS IMP SPEC: NORMAL
PATH REPORT.RELEVANT HX SPEC: NORMAL

## 2023-10-28 ASSESSMENT — SLEEP AND FATIGUE QUESTIONNAIRES
HOW LIKELY ARE YOU TO NOD OFF OR FALL ASLEEP IN A CAR, WHILE STOPPED FOR A FEW MINUTES IN TRAFFIC: WOULD NEVER DOZE
HOW LIKELY ARE YOU TO NOD OFF OR FALL ASLEEP WHILE WATCHING TV: SLIGHT CHANCE OF DOZING
HOW LIKELY ARE YOU TO NOD OFF OR FALL ASLEEP WHILE SITTING AND TALKING TO SOMEONE: WOULD NEVER DOZE
HOW LIKELY ARE YOU TO NOD OFF OR FALL ASLEEP WHILE SITTING QUIETLY AFTER LUNCH WITHOUT ALCOHOL: WOULD NEVER DOZE
HOW LIKELY ARE YOU TO NOD OFF OR FALL ASLEEP WHILE LYING DOWN TO REST IN THE AFTERNOON WHEN CIRCUMSTANCES PERMIT: SLIGHT CHANCE OF DOZING
HOW LIKELY ARE YOU TO NOD OFF OR FALL ASLEEP WHILE SITTING AND READING: SLIGHT CHANCE OF DOZING
HOW LIKELY ARE YOU TO NOD OFF OR FALL ASLEEP WHILE SITTING INACTIVE IN A PUBLIC PLACE: WOULD NEVER DOZE
HOW LIKELY ARE YOU TO NOD OFF OR FALL ASLEEP WHEN YOU ARE A PASSENGER IN A CAR FOR AN HOUR WITHOUT A BREAK: SLIGHT CHANCE OF DOZING

## 2023-10-30 LAB
HUMAN PAPILLOMA VIRUS 16 DNA: NEGATIVE
HUMAN PAPILLOMA VIRUS 18 DNA: NEGATIVE
HUMAN PAPILLOMA VIRUS FINAL DIAGNOSIS: NORMAL
HUMAN PAPILLOMA VIRUS OTHER HR: NEGATIVE

## 2023-11-03 ENCOUNTER — VIRTUAL VISIT (OUTPATIENT)
Dept: SLEEP MEDICINE | Facility: CLINIC | Age: 58
End: 2023-11-03
Payer: COMMERCIAL

## 2023-11-03 VITALS — BODY MASS INDEX: 27.31 KG/M2 | HEIGHT: 64 IN | WEIGHT: 160 LBS

## 2023-11-03 DIAGNOSIS — G47.00 INSOMNIA, UNSPECIFIED TYPE: ICD-10-CM

## 2023-11-03 DIAGNOSIS — R06.81 WITNESSED APNEIC SPELLS: ICD-10-CM

## 2023-11-03 DIAGNOSIS — R06.83 SNORING: ICD-10-CM

## 2023-11-03 DIAGNOSIS — G47.9 SLEEP DISTURBANCE: Primary | ICD-10-CM

## 2023-11-03 DIAGNOSIS — E66.3 OVERWEIGHT (BMI 25.0-29.9): ICD-10-CM

## 2023-11-03 DIAGNOSIS — I10 BENIGN ESSENTIAL HYPERTENSION: ICD-10-CM

## 2023-11-03 PROBLEM — K21.9 GASTROESOPHAGEAL REFLUX DISEASE WITHOUT ESOPHAGITIS: Status: ACTIVE | Noted: 2022-12-14

## 2023-11-03 PROCEDURE — 99205 OFFICE O/P NEW HI 60 MIN: CPT | Mod: VID | Performed by: NURSE PRACTITIONER

## 2023-11-03 ASSESSMENT — PAIN SCALES - GENERAL: PAINLEVEL: NO PAIN (0)

## 2023-11-03 NOTE — PROGRESS NOTES
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      FEV1-%Pred-Pre   Date Value Ref Range Status   11/23/2016 111 %      FEV1FVC-Pred   Date Value Ref Range Status   11/23/2016 81 %      FEV1FVC-Pre   Date Value Ref Range Status   11/23/2016 84 %      No results found for: \"20029\"  FEFMax-Pred   Date Value Ref Range Status   11/23/2016 6.74 L/sec      FEFMax-Pre   Date Value Ref Range Status   11/23/2016 6.71 L/sec      FEFMax-%Pred-Pre   Date Value Ref Range Status   11/23/2016 99 %      ExpTime-Pre   Date Value Ref Range Status   11/23/2016 6.86 sec      FIFMax-Pre   Date Value Ref Range Status   11/23/2016 3.29 L/sec      FEV1FEV6-Pred   Date Value Ref Range Status   11/23/2016 82 %      FEV1FEV6-Pre   Date Value Ref Range Status   11/23/2016 84 %      Narrative  Performed by: [facility] PFT  The FVC, FEV1, FEV1/FVC ratio and STN06-67% are within normal limits.  The inspiratory flow rates are within normal limits.    IMPRESSION:  Normal Spirometry         DARRIN Oh CNP 11/3/2023   Sleep Medicine      This note was written with the assistance of the Dragon voice-dictation technology software. The final document, although reviewed, may contain errors. For corrections, please contact the office.          "

## 2023-11-03 NOTE — PATIENT INSTRUCTIONS
"          MY TREATMENT INFORMATION FOR SLEEP APNEA-  Iliana Mcfarlane    DOCTOR : DARRIN Oh CNP    Am I having a sleep study at a sleep center?  --->Due to normal delays, you will be contacted within 2-4 weeks to schedule    Am I having a home sleep study?  --->Watch the video for the device you are using:    -/drop off device-   https://www.[employer].com/watch?v=yGGFBdELGhk    -Disposable device sent out require phone/computer application-   https://www.[employer].com/watch?v=BCce_vbiwxE      Frequently asked questions:  1. What is Obstructive Sleep Apnea (MEKA)? MEKA is the most common type of sleep apnea. Apnea means, \"without breath.\"  Apnea is most often caused by narrowing or collapse of the upper airway as muscles relax during sleep.   Almost everyone has occasional apneas. Most people with sleep apnea have had brief interruptions at night frequently for many years.  The severity of sleep apnea is related to how frequent and severe the events are.   2. What are the consequences of MEKA? Symptoms include: feeling sleepy during the day, snoring loudly, gasping or stopping of breathing, trouble sleeping, and occasionally morning headaches or heartburn at night.  Sleepiness can be serious and even increase the risk of falling asleep while driving. Other health consequences may include development of high blood pressure and other cardiovascular disease in persons who are susceptible. Untreated MEKA  can contribute to heart disease, stroke and diabetes.   3. What are the treatment options? In most situations, sleep apnea is a lifelong disease that must be managed with daily therapy. Medications are not effective for sleep apnea and surgery is generally not considered until other therapies have been tried. Your treatment is your choice . Continuous Positive Airway (CPAP) works right away and is the therapy that is effective in nearly everyone. An oral device to hold your jaw forward is usually the next " most reliable option. Other options include postioning devices (to keep you off your back), weight loss, and surgery including a tongue pacing device. There is more detail about some of these options below.  4. Are my sleep studies covered by insurance? Although we will request verification of coverage, we advise you also check in advance of the study to ensure there is coverage.    Important tips for those choosing CPAP and similar devices  For new devices, sign up for device TORY to monitor your device for your followup visits  We encourage you to utilize the [employer] tory or website (myAir web (resmed.com) ) to monitor your therapy progress and share the data with your healthcare team when you discuss your sleep apnea.                                                    Know your equipment:  CPAP is continuous positive airway pressure that prevents obstructive sleep apnea by keeping the throat from collapsing while you are sleeping. In most cases, the device is  smart  and can slowly self-adjusts if your throat collapses and keeps a record every day of how well you are treated-this information is available to you and your care team.  BPAP is bilevel positive airway pressure that keeps your throat open and also assists each breath with a pressure boost to maintain adequate breathing.  Special kinds of BPAP are used in patients who have inadequate breathing from lung or heart disease. In most cases, the device is  smart  and can slowly self-adjusts to assist breathing. Like CPAP, the device keeps a record of how well you are treated.  Your mask is your connection to the device. You get to choose what feels most comfortable and the staff will help to make sure if fits. Here: are some examples of the different masks that are available:       Key points to remember on your journey with sleep apnea:  Sleep study.  PAP devices often need to be adjusted during a sleep study to show that they are effective and  adjusted right.  Good tips to remember: Try wearing just the mask during a quiet time during the day so your body adapts to wearing it. A humidifier is recommended for comfort in most cases to prevent drying of your nose and throat. Allergy medication from your provider may help you if you are having nasal congestion.  Getting settled-in. It takes more than one night for most of us to get used to wearing a mask. Try wearing just the mask during a quiet time during the day so your body adapts to wearing it. A humidifier is recommended for comfort in most cases. Our team will work with you carefully on the first day and will be in contact within 4 days and again at 2 and 4 weeks for advice and remote device adjustments. Your therapy is evaluated by the device each day.   Use it every night. The more you are able to sleep naturally for 7-8 hours, the more likely you will have good sleep and to prevent health risks or symptoms from sleep apnea. Even if you use it 4 hours it helps. Occasionally all of us are unable to use a medical therapy, in sleep apnea, it is not dangerous to miss one night.   Communicate. Call our skilled team on the number provided on the first day if your visit for problems that make it difficult to wear the device. Over 2 out of 3 patients can learn to wear the device long-term with help from our team. Remember to call our team or your sleep providers if you are unable to wear the device as we may have other solutions for those who cannot adapt to mask CPAP therapy. It is recommended that you sleep your sleep provider within the first 3 months and yearly after that if you are not having problems.   Use it for your health. We encourage use of CPAP masks during daytime quiet periods to allow your face and brain to adapt to the sensation of CPAP so that it will be a more natural sensation to awaken to at night or during naps. This can be very useful during the first few weeks or months of adapting to  CPAP though it does not help medically to wear CPAP during wakefulness and  should not be used as a strategy just to meet guidelines.  Take care of your equipment. Make sure you clean your mask and tubing using directions every day and that your filter and mask are replaced as recommended or if they are not working.     BESIDES CPAP, WHAT OTHER THERAPIES ARE THERE?    Positioning Device  Positioning devices are generally used when sleep apnea is mild and only occurs on your back.This example shows a pillow that straps around the waist. It may be appropriate for those whose sleep study shows milder sleep apnea that occurs primarily when lying flat on one's back. Preliminary studies have shown benefit but effectiveness at home may need to be verified by a home sleep test. These devices are generally not covered by medical insurance.  Examples of devices that maintain sleeping on the back to prevent snoring and mild sleep apnea.    Belt type body positioner  http://[employer]/    Electronic reminder  http://nightshifttherapy.[employer]/            Oral Appliance  What is oral appliance therapy?  An oral appliance device fits on your teeth at night like a retainer used after having braces. The device is made by a specialized dentist and requires several visits over 1-2 months before a manufactured device is made to fit your teeth and is adjusted to prevent your sleep apnea. Once an oral device is working properly, snoring should be improved. A home sleep test may be recommended at that time if to determine whether the sleep apnea is adequately treated.       Some things to remember:  -Oral devices are often, but not always, covered by your medical insurance. Be sure to check with your insurance provider.   -If you are referred for oral therapy, you will be given a list of specialized dentists to consider or you may choose to visit the Web site of the American Academy of Dental Sleep Medicine  -Oral devices are less likely to  work if you have severe sleep apnea or are extremely overweight.     More detailed information  An oral appliance is a small acrylic device that fits over the upper and lower teeth  (similar to a retainer or a mouth guard). This device slightly moves jaw forward, which moves the base of the tongue forward, opens the airway, improves breathing for effective treat snoring and obstructive sleep apnea in perhaps 7 out of 10 people .  The best working devices are custom-made by a dental device  after a mold is made of the teeth 1, 2, 3.  When is an oral appliance indicated?  Oral appliance therapy is recommended as a first-line treatment for patients with primary snoring, mild sleep apnea, and for patients with moderate sleep apnea who prefer appliance therapy to use of CPAP4, 5. Severity of sleep apnea is determined by sleep testing and is based on the number of respiratory events per hour of sleep.   How successful is oral appliance therapy?  The success rate of oral appliance therapy in patients with mild sleep apnea is 75-80% while in patients with moderate sleep apnea it is 50-70%. The chance of success in patients with severe sleep apnea is 40-50%. The research also shows that oral appliances have a beneficial effect on the cardiovascular health of MEKA patients at the same magnitude as CPAP therapy7.  Oral appliances should be a second-line treatment in cases of severe sleep apnea, but if not completely successful then a combination therapy utilizing CPAP plus oral appliance therapy may be effective. Oral appliances tend to be effective in a broad range of patients although studies show that the patients who have the highest success are females, younger patients, those with milder disease, and less severe obesity. 3, 6.   Finding a dentist that practices dental sleep medicine  Specific training is available through the American Academy of Dental Sleep Medicine for dentists interested in working in the  field of sleep. To find a dentist who is educated in the field of sleep and the use of oral appliances, near you, visit the Web site of the American Academy of Dental Sleep Medicine.    References  1. Ayanna et al. Objectively measured vs self-reported compliance during oral appliance therapy for sleep-disordered breathing. Chest 2013; 144(5): 0603-5886.  2. Se, et al. Objective measurement of compliance during oral appliance therapy for sleep-disordered breathing. Thorax 2013; 68(1): 91-96.  3. Lucas et al. Mandibular advancement devices in 620 men and women with MEKA and snoring: tolerability and predictors of treatment success. Chest 2004; 125: 0609-7586.  4. Scott et al. Oral appliances for snoring and MEKA: a review. Sleep 2006; 29: 244-262.  5. Shelia et al. Oral appliance treatment for MEKA: an update. J Clin Sleep Med 2014; 10(2): 215-227.  6. Symone et al. Predictors of OSAH treatment outcome. J Dent Res 2007; 86: 4634-7447.      Weight Loss:    Weight loss is a long-term strategy that may improve sleep apnea in some patients.    Weight management is a personal decision and the decision should be based on your interest and the potential benefits.  If you are interested in exploring weight loss strategies, the following discussion covers the impact on weight loss on sleep apnea and the approaches that may be successful.    Being overweight does not necessarily mean you will have health consequences.  Those who have BMI over 35 or over 27 with existing medical conditions carries greater risk.   Weight loss decreases severity of sleep apnea in most people with obesity. For those with mild obesity who have developed snoring with weight gain, even 15-30 pound weight loss can improve and occasionally eliminate sleep apnea.  Structured and life-long dietary and health habits are necessary to lose weight and keep healthier weight levels.     Though there may be significant health benefits  from weight loss, long-term weight loss is very difficult to achieve- studies show success with dietary management in less than 10% of people. In addition, substantial weight loss may require years of dietary control and may be difficult if patients have severe obesity. In these cases, surgical management may be considered.  Finally, older individuals who have tolerated obesity without health complications may be less likely to benefit from weight loss strategies.      Your BMI is Body mass index is 27.46 kg/m .    What is BMI?  Body mass index (BMI) is one way to tell whether you are at a healthy weight, overweight, or obese. It measures your weight in relation to your height.  A BMI of 18.5 to 24.9 is in the healthy range. A person with a BMI of 25 to 29.9 is considered overweight, and someone with a BMI of 30 or greater is considered obese.  Another way to find out if you are at risk for health problems caused by overweight and obesity is to measure your waist. If you are a woman and your waist is more than 35 inches, or if you are a man and your waist is more than 40 inches, your risk of disease may be higher.  More than two-thirds of American adults are considered overweight or obese. Being overweight or obese increases the risk for further weight gain.  Excess weight may lead to heart disease and diabetes. Creating and following plans for healthy eating and physical activity may help you improve your health.    Methods for maintaining or losing weight.  Weight control is part of healthy lifestyle and includes exercise, emotional health, and healthy eating habits.  Careful eating habits lifelong is the mainstay of weight control.  Though there are significant health benefits from weight loss, long-term weight loss with diet alone may be very difficult to achieve- studies show long-term success with dietary management in less than 10% of people. Attaining a healthy weight may be especially difficult to achieve  in those with severe obesity. In some cases, medications, devices and surgical management might be considered.    What can you do?  If you are overweight or obese and are interested in methods for weight loss, you should discuss this with your provider. In addition, we recommend that you review healthy life styles and methods for weight loss available through the National Institutes of Health patient information sites:   http://win.niddk.nih.gov/publications/index.htm    Surgery:    Surgery for obstructive sleep apnea is considered generally only when other therapies fail to work. Surgery may be discussed with you if you are having a difficult time tolerating CPAP and or when there is an abnormal structure that requires surgical correction.  Nose and throat surgeries often enlarge the airway to prevent collapse.  Most of these surgeries create pain for 1-2 weeks and up to half of the most common surgeries are not effective throughout life.  You should carefully discuss the benefits and drawbacks to surgery with your sleep provider and surgeon to determine if it is the best solution for you.   More information  Surgery for MEKA is directed at areas that are responsible for narrowing or complete obstruction of the airway during sleep.  There are a wide range of procedures available to enlarge and/or stabilize the airway to prevent blockage of breathing in the three major areas where it can occur: the palate, tongue, and nasal regions.  Successful surgical treatment depends on the accurate identification of the factors responsible for obstructive sleep apnea in each person.  A personalized approach is required because there is no single treatment that works well for everyone.  Because of anatomic variation, consultation with an examination by a sleep surgeon is a critical first step in determining what surgical options are best for each patient.  In some cases, examination during sedation may be recommended in order to  guide the selection of procedures.  Patients will be counseled about risks and benefits as well as the typical recovery course after surgery. Surgery is typically not a cure for a person s MEKA.  However, surgery will often significantly improve one s MEKA severity (termed  success rate ).  Even in the absence of a cure, surgery will decrease the cardiovascular risk associated with OSA7; improve overall quality of life8 (sleepiness, functionality, sleep quality, etc).      Palate Procedures:  Patients with MEKA often have narrowing of their airway in the region of their tonsils and uvula.  The goals of palate procedures are to widen the airway in this region as well as to help the tissues resist collapse.  Modern palate procedure techniques focus on tissue conservation and soft tissue rearrangement, rather than tissue removal.  Often the uvula is preserved in this procedure. Residual sleep apnea is common in patient after pharyngoplasty with an average reduction in sleep apnea events of 33%2.      Tongue Procedures:  ExamWhile patients are awake, the muscles that surround the throat are active and keep this region open for breathing. These muscles relax during sleep, allowing the tongue and other structures to collapse and block breathing.  There are several different tongue procedures available.  Selection of a tongue base procedure depends on characteristics seen on physical exam.  Generally, procedures are aimed at removing bulky tissues in this area or preventing the back of the tongue from falling back during sleep.  Success rates for tongue surgery range from 50-62%3.    Hypoglossal Nerve Stimulation:  Hypoglossal nerve stimulation has recently received approval from the [place] Food and Drug Administration for the treatment of obstructive sleep apnea.  This is based on research showing that the system was safe and effective in treating sleep apnea6.  Results showed that the median AHI score decreased 68%,  from 29.3 to 9.0. This therapy uses an implant system that senses breathing patterns and delivers mild stimulation to airway muscles, which keeps the airway open during sleep.  The system consists of three fully implanted components: a small generator (similar in size to a pacemaker), a breathing sensor, and a stimulation lead.  Using a small handheld remote, a patient turns the therapy on before bed and off upon awakening.    Candidates for this device must be greater than 18 years of age, have moderate to severe MEKA (AHI between 15-65), BMI less than 35, have tried CPAP/oral appliance for at least 8 weeks without success, and have appropriate upper airway anatomy (determined by a sleep endoscopy performed by Dr. Reymundo Quintanilla).    Hypoglossal Nerve Stimulation Pathway:    The sleep surgeon s office will work with the patient through the insurance prior-authorization process (including communications and appeals).    Nasal Procedures:  Nasal obstruction can interfere with nasal breathing during the day and night.  Studies have shown that relief of nasal obstruction can improve the ability of some patients to tolerate positive airway pressure therapy for obstructive sleep apnea1.  Treatment options include medications such as nasal saline, topical corticosteroid and antihistamine sprays, and oral medications such as antihistamines or decongestants. Non-surgical treatments can include external nasal dilators for selected patients. If these are not successful by themselves, surgery can improve the nasal airway either alone or in combination with these other options.      Combination Procedures:  Combination of surgical procedures and other treatments may be recommended, particularly if patients have more than one area of narrowing or persistent positional disease.  The success rate of combination surgery ranges from 66-80%2,3.    References  Holly CAUSEY. The Role of the Nose in Snoring and Obstructive Sleep Apnoea: An  Update.  Eur Arch Otorhinolaryngol. 2011; 268: 1365-73.   Eduard SM; Neftali JA; Alberto JR; Pallanch JF; Tonio MB; Sandra SG; Jaylin PATTERSON. Surgical modifications of the upper airway for obstructive sleep apnea in adults: a systematic review and meta-analysis. SLEEP 2010;33(10):3696-8448. Bhavana GARNER. Hypopharyngeal surgery in obstructive sleep apnea: an evidence-based medicine review.  Arch Otolaryngol Head Neck Surg. 2006 Feb;132(2):206-13.  Jeffrey YH1, Geoffrey Y, Butch MIRNA. The efficacy of anatomically based multilevel surgery for obstructive sleep apnea. Otolaryngol Head Neck Surg. 2003 Oct;129(4):327-35.  Kezirian E, Goldberg A. Hypopharyngeal Surgery in Obstructive Sleep Apnea: An Evidence-Based Medicine Review. Arch Otolaryngol Head Neck Surg. 2006 Feb;132(2):206-13.  Micky FLORES et al. Upper-Airway Stimulation for Obstructive Sleep Apnea.  N Engl J Med. 2014 Jan 9;370(2):139-49.  Peobdulia Y et al. Increased Incidence of Cardiovascular Disease in Middle-aged Men with Obstructive Sleep Apnea. Am J Respir Crit Care Med; 2002 166: 159-165  Barnard EM et al. Studying Life Effects and Effectiveness of Palatopharyngoplasty (SLEEP) study: Subjective Outcomes of Isolated Uvulopalatopharyngoplasty. Otolaryngol Head Neck Surg. 2011; 144: 623-631.        WHAT IF I ONLY HAVE SNORING?    Mandibular advancement devices, lateral sleep positioning, long-term weight loss and treatment of nasal allergies have been shown to improve snoring.  Exercising tongue muscles with a game (https://apps.[employer].Wavesat/us/tory/soundly-reduce-snoring/zq6104138934) or stimulating the tongue during the day with a device (https://doi.org/10.3390/ova93889205) have improved snoring in some individuals.    Remember to Drive Safe... Drive Alive     Sleep health profoundly affects your health, mood, and your safety.  Thirty three percent of the population (one in three of us) is not getting enough sleep and many have a sleep disorder. Not getting enough sleep or  having an untreated / undertreated sleep condition may make us sleepy without even knowing it. In fact, our driving could be dramatically impaired due to our sleep health. As your provider, here are some things I would like you to know about driving:     Here are some warning signs for impairment and dangerous drowsy driving:              -Having been awake more than 16 hours               -Looking tired               -Eyelid drooping              -Head nodding (it could be too late at this point)              -Driving for more than 30 minutes     Some things you could do to make the driving safer if you are experiencing some drowsiness:              -Stop driving and rest              -Call for transportation              -Make sure your sleep disorder is adequately treated     Some things that have been shown NOT to work when experiencing drowsiness while driving:              -Turning on the radio              -Opening windows              -Eating any  distracting  /  entertaining  foods (e.g., sunflower seeds, candy, or any other)              -Talking on the phone      Your decision may not only impact your life, but also the life of others. Please, remember to drive safe for yourself and all of us.

## 2023-11-03 NOTE — NURSING NOTE
Is the patient currently in the state of [place]? YES    Visit mode:VIDEO    If the visit is dropped, the patient can be reconnected by: VIDEO VISIT: Text to cell phone:   Telephone Information:   Mobile [phone]       Will anyone else be joining the visit? NO  (If patient encounters technical issues they should call [phone] :[id number])    How would you like to obtain your AVS? MyChart    Are changes needed to the allergy or medication list? No    Reason for visit: Consult    Yoseph MCCLENDON

## 2023-11-03 NOTE — PROGRESS NOTES
"Virtual Visit Details    Type of service:  Video Visit     Originating Location (pt. Location): {video visit patient location:627176::\"Home\"}  {PROVIDER LOCATION On-site should be selected for visits conducted from your clinic location or adjoining [facility] hospital, academic office, or other nearby [facility] building. Off-site should be selected for all other provider locations, including home:[id number]}  Distant Location (provider location):  {virtual location provider:[id number]}  Platform used for Video Visit: {Virtual Visit Platforms:835214::\"[employer]\"}    "

## 2023-11-14 ENCOUNTER — ANCILLARY PROCEDURE (OUTPATIENT)
Dept: GENERAL RADIOLOGY | Facility: CLINIC | Age: 58
End: 2023-11-14
Attending: NURSE PRACTITIONER
Payer: COMMERCIAL

## 2023-11-14 ENCOUNTER — OFFICE VISIT (OUTPATIENT)
Dept: FAMILY MEDICINE | Facility: CLINIC | Age: 58
End: 2023-11-14
Payer: COMMERCIAL

## 2023-11-14 VITALS
HEART RATE: 87 BPM | RESPIRATION RATE: 18 BRPM | TEMPERATURE: 97.5 F | SYSTOLIC BLOOD PRESSURE: 130 MMHG | WEIGHT: 165.3 LBS | DIASTOLIC BLOOD PRESSURE: 80 MMHG | HEIGHT: 63 IN | BODY MASS INDEX: 29.29 KG/M2 | OXYGEN SATURATION: 97 %

## 2023-11-14 DIAGNOSIS — J02.9 SORE THROAT: ICD-10-CM

## 2023-11-14 DIAGNOSIS — R05.9 COUGH, UNSPECIFIED TYPE: ICD-10-CM

## 2023-11-14 DIAGNOSIS — J01.00 ACUTE NON-RECURRENT MAXILLARY SINUSITIS: Primary | ICD-10-CM

## 2023-11-14 LAB
DEPRECATED S PYO AG THROAT QL EIA: NEGATIVE
FLUAV AG SPEC QL IA: NEGATIVE
FLUBV AG SPEC QL IA: NEGATIVE
GROUP A STREP BY PCR: NOT DETECTED

## 2023-11-14 PROCEDURE — 71046 X-RAY EXAM CHEST 2 VIEWS: CPT | Mod: TC | Performed by: RADIOLOGY

## 2023-11-14 PROCEDURE — 87635 SARS-COV-2 COVID-19 AMP PRB: CPT | Performed by: NURSE PRACTITIONER

## 2023-11-14 PROCEDURE — 99214 OFFICE O/P EST MOD 30 MIN: CPT | Performed by: NURSE PRACTITIONER

## 2023-11-14 PROCEDURE — 87804 INFLUENZA ASSAY W/OPTIC: CPT | Performed by: NURSE PRACTITIONER

## 2023-11-14 PROCEDURE — 87651 STREP A DNA AMP PROBE: CPT | Performed by: NURSE PRACTITIONER

## 2023-11-14 RX ORDER — CEFDINIR 300 MG/1
300 CAPSULE ORAL 2 TIMES DAILY
Qty: 14 CAPSULE | Refills: 0 | Status: SHIPPED | OUTPATIENT
Start: 2023-11-14 | End: 2023-11-21

## 2023-11-14 NOTE — PROGRESS NOTES
"  Assessment & Plan     (J01.00) Acute non-recurrent maxillary sinusitis  (primary encounter diagnosis)  Comment:   Plan: cefdinir (OMNICEF) 300 MG capsule        Discussed the use and indication of this medication as well as potential side effects.  Discussed symptomatic treatment measures    (J02.9) Sore throat  Comment:   Plan: Influenza A & B Antigen - Clinic Collect,         Symptomatic COVID-19 Virus (Coronavirus) by PCR        Nose, Streptococcus A Rapid Screen w/Reflex to         PCR - Clinic Collect, Group A Streptococcus PCR        Throat Swab            (R05.9) Cough, unspecified type  Comment:   Plan: XR Chest 2 Views        Chest xray is negative per my read.              BMI:   Estimated body mass index is 29.4 kg/m  as calculated from the following:    Height as of this encounter: 1.597 m (5' 2.87\").    Weight as of this encounter: 75 kg (165 lb 4.8 oz).           Janey Aguirre NP  [facility]    Nelda Velarde is a 58 year old, presenting for the following health issues:  Other (Daughter positive for strep on 10/23, patient did virtual shortly after and was given Amoxicillin, patient completed. Still having a sore throat. ) and chest tightness      11/14/2023     3:55 PM   Additional Questions   Roomed by Fay HYMAN RN   Accompanied by None         11/14/2023     3:55 PM   Patient Reported Additional Medications   Patient reports taking the following new medications None       History of Present Illness       Reason for visit:  Sore throat and worsening cough  Symptom onset:  3-4 weeks ago  Symptom intensity:  Moderate  Symptom progression:  Worsening  Had these symptoms before:  Yes  Has tried/received treatment for these symptoms:  Yes  Previous treatment was successful:  No  What makes it better:  Warm liquids and sleep    She eats 4 or more servings of fruits and vegetables daily.She consumes 0 sweetened beverage(s) daily.She exercises with enough effort to " "increase her heart rate 20 to 29 minutes per day.  She exercises with enough effort to increase her heart rate 6 days per week.   She is taking medications regularly.     Chest tightness, cough, sore throat, ear pain, swollen glands.  Nasal congestion last weekend, this is improving.   Myalgias.  Mild fever last weekend.     She had a sinus infection early October that seemed to improve, but never fully resolved.  She then worsened around the time her daughter had strep.     She did a virtual visit through [facility] at the end of October since her daughter had strep and was treated with Amoxicillin, which did not help.                Review of Systems         Objective    /80 (BP Location: Right arm, Patient Position: Sitting, Cuff Size: Adult Regular)   Pulse 87   Temp 97.5  F (36.4  C) (Temporal)   Resp 18   Ht 1.597 m (5' 2.87\")   Wt 75 kg (165 lb 4.8 oz)   LMP  (LMP Unknown)   SpO2 97%   BMI 29.40 kg/m    Body mass index is 29.4 kg/m .  Physical Exam   GENERAL: healthy, alert and no distress  HENT: ear canals and TM's normal, nose and mouth without ulcers or lesions  NECK: no adenopathy, no asymmetry, masses, or scars and thyroid normal to palpation  RESP: lungs clear to auscultation - no rales, rhonchi or wheezes  CV: regular rate and rhythm, normal S1 S2, no S3 or S4, no murmur, click or rub, no peripheral edema and peripheral pulses strong  ABDOMEN: soft, nontender, no hepatosplenomegaly, no masses and bowel sounds normal  PSYCH: mentation appears normal, affect normal/bright    Results for orders placed or performed in visit on 11/14/23 (from the past 24 hour(s))   Influenza A & B Antigen - Clinic Collect    Specimen: Nose; Swab   Result Value Ref Range    Influenza A antigen Negative Negative    Influenza B antigen Negative Negative    Narrative    Test results must be correlated with clinical data. If necessary, results should be confirmed by a molecular assay or viral culture.   Streptococcus A " Rapid Screen w/Reflex to PCR - Clinic Collect    Specimen: Throat; Swab   Result Value Ref Range    Group A Strep antigen Negative Negative

## 2023-11-15 LAB — SARS-COV-2 RNA RESP QL NAA+PROBE: NEGATIVE

## 2024-01-03 ENCOUNTER — OFFICE VISIT (OUTPATIENT)
Dept: SLEEP MEDICINE | Facility: CLINIC | Age: 59
End: 2024-01-03
Attending: NURSE PRACTITIONER
Payer: COMMERCIAL

## 2024-01-03 DIAGNOSIS — G47.9 SLEEP DISTURBANCE: ICD-10-CM

## 2024-01-03 DIAGNOSIS — I10 BENIGN ESSENTIAL HYPERTENSION: ICD-10-CM

## 2024-01-03 DIAGNOSIS — R06.83 SNORING: ICD-10-CM

## 2024-01-03 DIAGNOSIS — R06.81 WITNESSED APNEIC SPELLS: ICD-10-CM

## 2024-01-03 DIAGNOSIS — G47.00 INSOMNIA, UNSPECIFIED TYPE: ICD-10-CM

## 2024-01-03 DIAGNOSIS — E66.3 OVERWEIGHT (BMI 25.0-29.9): ICD-10-CM

## 2024-01-03 PROCEDURE — G0399 HOME SLEEP TEST/TYPE 3 PORTA: HCPCS | Performed by: INTERNAL MEDICINE

## 2024-01-03 NOTE — PROGRESS NOTES
Pt is completing a home sleep test. Pt was instructed on how to put on the Noxturnal T3 device and associated equipment before going to bed and given the opportunity to practice putting it on before leaving the sleep center. Pt was reminded to bring the home sleep test kit back to the center tomorrow, at agreed upon time for download and reporting.   Loretta Mcgee CMA on 1/3/2024 at 11:12 AM

## 2024-01-04 ENCOUNTER — DOCUMENTATION ONLY (OUTPATIENT)
Dept: SLEEP MEDICINE | Facility: CLINIC | Age: 59
End: 2024-01-04
Payer: COMMERCIAL

## 2024-01-04 NOTE — PROGRESS NOTES
HST POST-STUDY QUESTIONNAIRE    What time did you go to bed?  10:30  How long do you think it took to fall asleep?  ~20 min  What time did you wake up to start the day?  7:20  Did you get up during the night at all?  Yes  If you woke up, do you remember approximately what time(s)? 2 am and 8?  Did you have any difficulty with the equipment?  No  Did you us any type of treatment with this study?  None  Was the head of the bed elevated? No  Did you sleep in a recliner?  No  Did you stop using CPAP at least 3 days before this test?  NA  Any other information you'd like us to know?

## 2024-01-04 NOTE — NURSING NOTE
Pt returned HST device. It was downloaded and forwarded data to the clinical specialist for scoring.  Loretta Mcgee CMA on 1/4/2024 at 9:12 AM

## 2024-01-10 NOTE — PROGRESS NOTES
This HSAT was performed using a Noxturnal T3 device which recorded snore, sound, movement activity, body position, nasal pressure, oronasal thermal airflow, pulse, oximetry and both chest and abdominal respiratory effort. HSAT data was restricted to the time patient states they were in bed.     HSAT was scored using 1B 4% hypopnea rule.     HST AHI (Non-PAT): 5.2  Snoring was reported as mild and moderate.  Time with SpO2 below 89% was 51.9 minutes.   Overall signal quality was good.     Pt will follow up with sleep provider to determine appropriate therapy.

## 2024-01-11 NOTE — PROCEDURES
"HOME SLEEP STUDY INTERPRETATION        Patient: Iliana Mcfarlane  MRN: [id number]  YOB: 1965  Study Date: 1/3/2024  PCP/Referring Provider: Janey Aguirre;   Ordering Provider: DARRIN Oh CNP       Indications for Home Study: Iliana Mcfarlane is a 58 year old female who presents with symptoms suggestive of obstructive sleep apnea.    Estimated body mass index is 29.4 kg/m  as calculated from the following:    Height as of 11/14/23: 1.597 m (5' 2.87\").    Weight as of 11/14/23: 75 kg (165 lb 4.8 oz).  Total score - [employer]: 4 (10/28/2023  2:47 PM)      Data: A full night home sleep study was performed recording the standard physiologic parameters including body position, movement, sound, nasal pressure, thermal oral airflow, chest and abdominal movements with respiratory inductance plethysmography, and oxygen saturation by pulse oximetry. Pulse rate was estimated by oximetry recording. This study was considered adequate based on > 4 hours of quality oximetry and respiratory recording. As specified by the AASM Manual for the Scoring of Sleep and Associated events, version 2.3, Rule VIII.D 1B, 4% oxygen desaturation scoring for hypopneas is used as a standard of care on all home sleep apnea testing.        Analysis Time:  486 minutes        Respiration:   Sleep Associated Hypoxemia: sustained hypoxemia was reported. Baseline oxygen saturation was 91%.  Time with saturation less than or equal to 88% was 51.9 minutes. The lowest oxygen saturation was 75%.   Snoring: Snoring was present.  Respiratory events: The home study revealed a presence of 1 obstructive apneas and 14 mixed and central apneas. There were 27 hypopneas resulting in a combined apnea/hypopnea index [AHI] of 5.2 events per hour.  AHI was 15.6 per hour supine, N/A per hour prone, 1.6 per hour on left side, and 2.9 per hour on right side.   Pattern: Excluding events noted above, respiratory rate and pattern was " Normal.      Position: Percent of time spent: supine - 21.4%, prone - 0%, on left - 31.4%, on right - 46.7%.      Heart Rate: By pulse oximetry normal rate was noted.       Assessment:   Mild obstructive sleep apnea.  Sleep associated hypoxemia was reported.  However, veracity of low oxygen recording will have to be rechecked.  Sleep apnea was better dominantly supine position related.    Recommendations:  If treatment of mild obstructive sleep apnea is clinically indicated, following therapy options can be considered.  Patient can consider oral appliance therapy through referral to dental sleep medicine for treatment of mild obstructive sleep apnea.  If there is excessive daytime sleepiness or other qualifying medical comorbidity, auto CPAP therapy can be considered.  Positional therapy to avoid supine sleep is a consideration.  Consider overnight oximetry to reassess sustained hypoxemia reported on the test.        Diagnosis Code(s): Obstructive Sleep Apnea G47.33, Hypoxemia G47.36    Electronically signed by: Jagdish Zamora MD, January 11, 2024   Diplomate, American Board of Psychiatry and Neurology, Sleep Medicine

## 2024-01-24 ASSESSMENT — SLEEP AND FATIGUE QUESTIONNAIRES
HOW LIKELY ARE YOU TO NOD OFF OR FALL ASLEEP WHILE SITTING INACTIVE IN A PUBLIC PLACE: SLIGHT CHANCE OF DOZING
HOW LIKELY ARE YOU TO NOD OFF OR FALL ASLEEP IN A CAR, WHILE STOPPED FOR A FEW MINUTES IN TRAFFIC: WOULD NEVER DOZE
HOW LIKELY ARE YOU TO NOD OFF OR FALL ASLEEP WHILE SITTING AND TALKING TO SOMEONE: WOULD NEVER DOZE
HOW LIKELY ARE YOU TO NOD OFF OR FALL ASLEEP WHEN YOU ARE A PASSENGER IN A CAR FOR AN HOUR WITHOUT A BREAK: MODERATE CHANCE OF DOZING
HOW LIKELY ARE YOU TO NOD OFF OR FALL ASLEEP WHILE WATCHING TV: MODERATE CHANCE OF DOZING
HOW LIKELY ARE YOU TO NOD OFF OR FALL ASLEEP WHILE LYING DOWN TO REST IN THE AFTERNOON WHEN CIRCUMSTANCES PERMIT: SLIGHT CHANCE OF DOZING
HOW LIKELY ARE YOU TO NOD OFF OR FALL ASLEEP WHILE SITTING AND READING: SLIGHT CHANCE OF DOZING
HOW LIKELY ARE YOU TO NOD OFF OR FALL ASLEEP WHILE SITTING QUIETLY AFTER LUNCH WITHOUT ALCOHOL: WOULD NEVER DOZE

## 2024-01-30 ENCOUNTER — VIRTUAL VISIT (OUTPATIENT)
Dept: SLEEP MEDICINE | Facility: CLINIC | Age: 59
End: 2024-01-30
Payer: COMMERCIAL

## 2024-01-30 VITALS — HEIGHT: 64 IN | WEIGHT: 165 LBS | BODY MASS INDEX: 28.17 KG/M2

## 2024-01-30 DIAGNOSIS — G47.36 HYPOXEMIA ASSOCIATED WITH SLEEP: ICD-10-CM

## 2024-01-30 DIAGNOSIS — G47.33 OSA (OBSTRUCTIVE SLEEP APNEA): Primary | ICD-10-CM

## 2024-01-30 PROCEDURE — 99214 OFFICE O/P EST MOD 30 MIN: CPT | Mod: 95 | Performed by: NURSE PRACTITIONER

## 2024-01-30 ASSESSMENT — PAIN SCALES - GENERAL: PAINLEVEL: NO PAIN (0)

## 2024-01-30 NOTE — PATIENT INSTRUCTIONS
POSITIONAL SLEEP DEVICES:  Devices that maintain sleeping on the back to prevent snoring and mild sleep apnea.    Http://F[employer] Garage/  https://www.Cartesian/Sleep-Noodle-Positional-Aid-Large/dp/[email or web address]  [email or web address].[email or web address]      SLEEP DENTAL PROVIDERS LIST:    [facility] [place] (Medicare)  Provider: Kit Majano DDS   [address] [facility], [place] [address]  Phone: [phone]  Fax: [phone]    [facility]  Providers: Gunnar Tao DDS, Lety Harrell DDS, Renan Reynaga DDS  Fax [phone]  Locations:   [phone]  [facility]  [address]  [facility]  [address]  [facility]  [address] [facility]  [address]   [phone]  [facility]  [address] [facility]  [address] [facility]  [place]  [address] [facility] (Medicare)  Providers: Petros Albert DDS, FADERRICK, Arleth Dunlap DDS, MS, Belkis Cadena DDS, Rhianna Zaman DDS  [address] [facility] [address] [facility], [address], [place] [address]  Phone: [phone]  Fax: [phone]    [facility]  Provider: Gardenia Carrero DDS, PhD    [address] [place] [address]  Phone: [phone]  Fax: [phone]    [address] [place] [address]  Phone: [phone]  Fax: [phone]      [facility]  Provider: Quincy Winter DDS  Address: [address], [place] [address]  Phone: [phone]      [facility]   Provider: Yanick Vu DDS  [facility]  [address] [place] [address]  Appointments: [phone]    [facility]   Provider: Leonardo Lagunas DDS   [address]   [place] [address]   Appointments: [phone]   Fax: [phone]     [facility]   Provider: Oh Carpenter DDS, MS  - [DME Medicare]  [facility]   [address]   [place] [address]   Appointments: [phone]   Fax: [phone]     [facility]  Provider: Dawit Rich DMD, MSD - [DME Medicare]  [address], [address]  [place] [address]  Appointments [phone]  Fax: [phone]    [facility]   Sleep [facility]   Provider: Marcellus Zamora DDS, [facility]  [address] [place] [address] [address]  [place] [address]   Appointments: [phone]  Fax: [phone]     [facility]   Providers: Madi Hammonds DMD, Leonardo Lagunas DDS   [address]   [place] [address]   Appointments: [phone]     [facility]   Providers: Olegario Staton DDS, Renan Lopez DDS  [address] [facility], [address]   [place] [address]   Appointments: [phone]   Fax: [phone]     Provider: Benton James DDS  [address]  [place]  [address]  [phone]      Durable Medical Equipment:  [facility]  [address] [place] [address], [address]  [place] [address]  Phone: [phone]    Hours:  Mon - Fri: 8:00 a.m. - 4:30 p.m.  Sat: Closed  Sun: Closed

## 2024-01-30 NOTE — NURSING NOTE
Is the patient currently in the state of [place]? YES    Visit mode:VIDEO    If the visit is dropped, the patient can be reconnected by: VIDEO VISIT: Send to e-mail at: [email or web address]    Will anyone else be joining the visit? NO  (If patient encounters technical issues they should call [phone])    How would you like to obtain your AVS? MyChart    Are changes needed to the allergy or medication list? Pt stated no changes to allergies and Pt stated no med changes    Reason for visit: RECHECK  Has patient had flu shot for current/most recent flu season? If so, when? Yes: 09/19/2023    Sandra MCCLENDON

## 2024-01-30 NOTE — PROGRESS NOTES
"Virtual Visit Details    Type of service:  Video Visit     Originating Location (pt. Location): Home    Distant Location (provider location):  Off-site  Platform used for Video Visit: ThreatStream      Home Sleep Apnea Testing Results Visit:    Chief Complaint   Patient presents with    RECHECK     Iliana Mcfarlane is a 59-year-old female with PMH pertinent for HTN, prediabetes, asthma, major depression, anxiety, and glaucoma who returns to [facility] after having had Home Sleep Apnea Testing.  She presented with symptoms suggestive of obstructive sleep apnea.    Estimated body mass index is 28.32 kg/m  as calculated from the following:    Height as of this encounter: 1.626 m (5' 4\").    Weight as of this encounter: 74.8 kg (165 lb).    Total score - [employer]: 7 (1/24/2024 12:51 PM)   Total Score: 5 (10/28/2023  2:50 PM)  Total score - Insomnia Severity Index: 22 (1/24/2024 12:50 PM)       Home Sleep Apnea Testing - 1/03/2024: Weight: 165 lbs 0 oz    Analysis Time:  486 minutes     Respiration:   Sleep Associated Hypoxemia: sustained hypoxemia was reported. Baseline oxygen saturation was 91%.  Time with saturation less than or equal to 88% was 51.9 minutes. The lowest oxygen saturation was 75%.   Snoring: Snoring was present. Snoring was reported as mild and moderate.   Respiratory events: The home study revealed a presence of 1 obstructive apneas and 14 mixed and central apneas. There were 27 hypopneas resulting in a combined apnea/hypopnea index [AHI] of 5.2 events per hour.  AHI was 15.6 per hour supine, N/A per hour prone, 1.6 per hour on left side, and 2.9 per hour on right side.   Pattern: Excluding events noted above, respiratory rate and pattern was Normal.     Position: Percent of time spent: supine - 21.4%, prone - 0%, on left - 31.4%, on right - 46.7%.     Heart Rate: By pulse oximetry normal rate was noted.      Assessment:   Mild obstructive sleep apnea.  Sleep associated hypoxemia was " "reported.  However, veracity of low oxygen recording will have to be rechecked.  Sleep apnea was better dominantly supine position related.      Signal quality of Oxymeter 99.9% Good  Nasal Cannula 100.0% Good  RIP belts 100.0% Good.     Iliana Mcfarlane reports that she slept Good .     Home Sleep Apnea Testing was reviewed in detail today with Iliana and a copy given to her for her records.    Past medical/surgical history, family history, social history, medications and allergies were reviewed.    Patient Active Problem List   Diagnosis    MENOMETRORRHAGIA    Glaucoma, [place]    Genital herpes    Diverticulosis of large intestine without hemorrhage    Calculus of gallbladder without cholecystitis without obstruction    KANIKA (generalized anxiety disorder)    Moderate recurrent major depression (H)    Cervical high risk HPV (human papillomavirus) test positive    Mild persistent asthma without complication    Prediabetes    Benign essential hypertension    Gastroesophageal reflux disease without esophagitis     Current Outpatient Medications   Medication    albuterol (PROAIR HFA/PROVENTIL HFA/VENTOLIN HFA) 108 (90 Base) MCG/ACT inhaler    budesonide (PULMICORT FLEXHALER) 90 MCG/ACT inhaler    CALCIUM-MAGNESIUM-ZINC PO    lifitegrast (XIIDRA) 5 % opthalmic solution    losartan (COZAAR) 50 MG tablet    Omega-3 Fatty Acids (OMEGA-3 FISH OIL PO)    omeprazole (PRILOSEC) 40 MG DR capsule    sertraline (ZOLOFT) 50 MG tablet    sodium chloride 0.9%, bottle, 0.9 % irrigation    zolpidem ER (AMBIEN CR) 12.5 MG CR tablet    Beclomethasone Dipropionate (QNASL) 80 MCG/ACT AERS Nasal Spray    clindamycin (CLEOCIN) 150 MG capsule     No current facility-administered medications for this visit.     Ht 1.626 m (5' 4\")   Wt 74.8 kg (165 lb)   LMP  (LMP Unknown)   BMI 28.32 kg/m      Impression/Plan:  Mild Obstructive Sleep Apnea.   Sleep associated hypoxemia was present.     Treatment options discussed today including  " auto-CPAP at 5-15 cmH2O, oral appliance therapy, or positional therapy.  All of these options were discussed in detail with the patient today.    Elected to consider her treatment options and will do so more research on this first.  She will contact me via [employer] message should she like to pursue one of the above treatment options as discussed in detail today.    Follow-up as needed or to be determined by treatment options as noted above.    35 minutes spent with patient with >50% spent in counseling, chart review/documentation, and coordination of care on the date of the encounter.      DARRIN Oh CNP  Sleep Medicine      CC:  Janey Aguirre,     This note was written with the assistance of the Dragon voice-dictation technology software. The final document, although reviewed, may contain errors. For corrections, please contact the office.

## 2024-02-09 ENCOUNTER — MYC MEDICAL ADVICE (OUTPATIENT)
Dept: SLEEP MEDICINE | Facility: CLINIC | Age: 59
End: 2024-02-09
Payer: COMMERCIAL

## 2024-02-09 DIAGNOSIS — G47.36 HYPOXEMIA ASSOCIATED WITH SLEEP: ICD-10-CM

## 2024-02-09 DIAGNOSIS — G47.33 OSA (OBSTRUCTIVE SLEEP APNEA): Primary | ICD-10-CM

## 2024-03-05 ENCOUNTER — DOCUMENTATION ONLY (OUTPATIENT)
Dept: SLEEP MEDICINE | Facility: CLINIC | Age: 59
End: 2024-03-05
Payer: COMMERCIAL

## 2024-03-05 DIAGNOSIS — I10 ESSENTIAL HYPERTENSION, MALIGNANT: ICD-10-CM

## 2024-03-05 DIAGNOSIS — G47.33 OSA (OBSTRUCTIVE SLEEP APNEA): Primary | ICD-10-CM

## 2024-03-05 NOTE — PROGRESS NOTES
Patient was offered choice of vendor and chose [facility].  Patient Iliana Mcfarlane was set up at [facility]  on March 5, 2024. Patient received a Resmed Airsense 11 Pressures were set at  5-15 cm H2O.   Patient s ramp is 4 cm H2O for Auto and FLEX/EPR is EPR, 2.  Patient received a Resmed Mask name: AIRFIT P10  Pillow mask size For Her, Small, heated tubing and heated humidifier.  Patient has the following compliance requirements: usage only.  Baltazar Valiente

## 2024-03-08 ENCOUNTER — DOCUMENTATION ONLY (OUTPATIENT)
Dept: SLEEP MEDICINE | Facility: CLINIC | Age: 59
End: 2024-03-08
Payer: COMMERCIAL

## 2024-03-08 NOTE — PROGRESS NOTES
3 day Sleep therapy management telephone visit    Diagnostic AHI:  5.2 HST    Confirmed with patient at time of call- N/A Patient is still interested in STM service       Message left for patient to return call.        Objective data     Order Settings for PAP  CPAP min     CPAP max              Device settings from machine CPAP min 5.0     CPAP max 15.0           EPR Setting THREE    RESMED soft response  ON     Assessment: Minimal usage      Action plan: Patient to have 14 day STM visit. Patient has a follow up visit scheduled:   no    Replacement device: No  STM ordered by provider: Yes     Total time spent on accessing and  interpreting remote patient PAP therapy data  10 minutes    Total time spent counseling, coaching  and reviewing PAP therapy data with patient  1 minutes    [id number] no

## 2024-03-30 ENCOUNTER — HEALTH MAINTENANCE LETTER (OUTPATIENT)
Age: 59
End: 2024-03-30

## 2024-04-05 ENCOUNTER — DOCUMENTATION ONLY (OUTPATIENT)
Dept: SLEEP MEDICINE | Facility: CLINIC | Age: 59
End: 2024-04-05
Payer: COMMERCIAL

## 2024-04-05 NOTE — PROGRESS NOTES
30 DAY STM VISIT    Diagnostic AHI:  HST: 5.2      PAP settings:  CPAP MIN CPAP MAX 95TH % PRESSURE EPR RESMED SOFT RESPONSE SETTING   5.0 cm  H20 15.0 cm  H20 9.4 cm  H20  THREE OFF     Device type: Auto-CPAP  Mask type:  Per patient choice    Objective measures: 14 day rolling measures:    COMPLIANCE LEAK AHI AVERAGE USE IN MINUTES   42 % 7.2 5.53 118   GOAL >70% GOAL < 24 LPM GOAL <5 GOAL >240        Assessment: Pt not meeting objective benchmarks for compliance     Message left for patient to return call   Action plan: waiting for patient to return call.  Patient has the following upcoming sleep appts:    Total time spent on accessing and interpreting remote patient PAP therapy data  10 minutes    Total time spent counseling, coaching  and reviewing PAP therapy data with patient  1 minutes     [id number] this call  [id number] no  at 3 or 14 day [facility]

## 2024-09-09 DIAGNOSIS — F41.1 GAD (GENERALIZED ANXIETY DISORDER): ICD-10-CM

## 2024-09-09 DIAGNOSIS — F33.1 MODERATE RECURRENT MAJOR DEPRESSION (H): ICD-10-CM

## 2024-09-15 DIAGNOSIS — I10 BENIGN ESSENTIAL HYPERTENSION: ICD-10-CM

## 2024-09-15 RX ORDER — LOSARTAN POTASSIUM 50 MG/1
50 TABLET ORAL DAILY
Qty: 90 TABLET | Refills: 3 | OUTPATIENT
Start: 2024-09-15

## 2024-09-25 ENCOUNTER — THERAPY VISIT (OUTPATIENT)
Dept: PHYSICAL THERAPY | Facility: CLINIC | Age: 59
End: 2024-09-25
Payer: COMMERCIAL

## 2024-09-25 DIAGNOSIS — R10.2 PELVIC PAIN IN FEMALE: Primary | ICD-10-CM

## 2024-09-25 PROCEDURE — 97140 MANUAL THERAPY 1/> REGIONS: CPT | Mod: GP

## 2024-09-25 PROCEDURE — 97161 PT EVAL LOW COMPLEX 20 MIN: CPT | Mod: GP

## 2024-09-25 PROCEDURE — 97110 THERAPEUTIC EXERCISES: CPT | Mod: GP

## 2024-09-25 PROCEDURE — 97530 THERAPEUTIC ACTIVITIES: CPT | Mod: GP

## 2024-09-25 NOTE — PROGRESS NOTES
PHYSICAL THERAPY EVALUATION  Type of Visit: Evaluation              Subjective       Presenting condition or subjective complaint: pelvic bone tilted wrong (significant SPD pain)  Patient reports to outpatient physical therapy with pubic bone pain that started a few months ago and got worse recently. She has also had this in the past. The last few times she has tried chiropractor and PT it wasn't as helpful. She feels it sitting, standing, walking and in the car. There is a deep inner groin pain. The pubic bone pain is most noticeable with walking - it is sharp and does not feel like it warms up. She also has a hx of SIJ pain and also has some irritation there as well. If she tries to use her core muscles it is difficult. Tried some somatic release - supine with LE mobility but that felt like too much.     Started to also notice some aching pain and dull on the L side. The pubic bone pain is central.     Date of onset:      Relevant medical history: Asthma; Depression; Overweight   Dates & types of surgery: none recently - others should be in my history    Prior diagnostic imaging/testing results:       Prior therapy history for the same diagnosis, illness or injury: Yes regularly from [id number] (pregnancies), and occassionally thereafter    Prior Level of Function  Transfers: Independent  Ambulation: Independent  ADL: Independent  IADL:     Living Environment  Social support: With family members   Type of home: House   Stairs to enter the home: Yes 25 Is there a railing: Yes     Ramp: No   Stairs inside the home: Yes 20 Is there a railing: Yes     Help at home: None  Equipment owned:       Employment: Yes IT  Hobbies/Interests: walking, gardening, kids activities    Patient goals for therapy: walk, sit, lay down without pain    Pain assessment: Pain present     Objective      PELVIC EVALUATION  ADDITIONAL HISTORY:  Sex assigned at birth: Female  Gender identity: Female    Pronouns: She/Her Hers      Bladder  History:  Feels bladder filling: Yes  Triggers for feeling of inability to wait to go to the bathroom: No    How long can you wait to urinate: its fine  Gets up at night to urinate: Yes 2  Can stop the flow of urine when urinating: Yes  Volume of urine usually released: Average   Other issues:    Number of bladder infections in last 12 months:    Fluid intake per day: 24 6 0  Medications taken for bladder: No     Activities causing urine leak: Cough; Sneeze    Amount of urine typically leaked: small (less problematic recently)  Pads used to help with leaking: No        Bowel History:  Frequency of bowel movement: 2x/day  Consistency of stool: Soft-formed    Ignores the urge to defecate: No  Other bowel issues:    Length of time spent trying to have a bowel movement:      Sexual Function History:  Sexual orientation: Straight    Sexually active: No  Lubrication used: No No  Pelvic pain: Walking; Standing; Sitting; Certain positions constant currently - can't find relief  Pain or difficulty with orgasms/erection/ejaculation:      State of menopause: Post-menopause (I am done with menopause)  Hormone medications: No      Are you currently pregnant: No  Number of previous pregnancies: 7  Number of deliveries: 3  If you have delivered before, did you have any of these issues during delivery: Tearing; Episiotomy; Vaginal delivery  Have you been diagnosed with pelvic prolapse or abdominal separation: No  Do you get regular exercise: Yes  I do this type of exercise: walking, gardening, yoga/light weights  Have you tried pelvic floor strengthening exercises for 4 weeks: Yes      Discussed reason for referral regarding pelvic health needs and external/internal pelvic floor muscle examination with patient/guardian.  Opportunity provided to ask questions and verbal consent for assessment and intervention was given.    PAIN: Pain Level at Rest: 4/10  Pain Level with Use: 8/10  Pain Location: pubic bone  Pain Quality: sharp on  the pubic bone, burning, stabbing  POSTURE: WFL  Gait: limited stance time L side  LUMBAR SCREEN: AROM WNL  Increased pain in pubic bone with flexion, extension WNL  HIP SCREEN:  PROM: WNL bilaterally hip flexion, IR and ER   Strength:  hip flexion: 5 bilaterally no pain, hip abd: 3 bilaterally, hip ext: 3+ bilaterally    Functional Strength Testing: Double Leg Squat: Improper use of glutes/hips and slight weight shift R    PELVIC/SI SCREEN:  Pubic Symphysis Provocation: no pain with resisted adduction    BREATHING SYMMETRY: WNL    PELVIC EXAM  External Visual Inspection:  At rest: Normal  With voluntary pelvic floor contraction: Present  Relaxation of PFM: Yes    Integumentary:   Introitus: Unremarkable    External Digital Palpation per Perineum:   Ischiocavernosis: Unremarkable  Bulbo cavernosis: Unremarkable  Transverse perineal: Tenderness  Levator ani: Unremarkable  Perineal body: Tenderness  Public symphysis: Pain slightly to the L of pubic symphysis (more on rami)    Internal Digital Palpation:  Per Vagina:  Tenderness  Myofascial Resistance to Palpation: Soft  Digital Muscle Performance: P (Power): 4  Compensations: none  Relaxation Post-Contraction: Partial/delayed relaxation    Per Rectum:  Not assessed      Pelvic Organ Prolapse:   Not assessed    ABDOMINAL ASSESSMENT    Abdominal Activation/Strength: SLR: 4- L side, 4+ R side (pain posterior)    BIOFEEDBACK:  Position:   Surface Electrodes:     Abdominals:     Perianals:       Assessment & Plan   CLINICAL IMPRESSIONS  Medical Diagnosis: pubic bone pain    Treatment Diagnosis:     Impression/Assessment: Patient is a 59 year old female with pubic bone pain complaints.  The following significant findings have been identified: Pain, Decreased ROM/flexibility, Decreased strength, Impaired gait, Impaired muscle performance, and Decreased activity tolerance. These impairments interfere with their ability to perform self care tasks, recreational activities,  household chores, driving , household mobility, and community mobility as compared to previous level of function.     Clinical Decision Making (Complexity):  Clinical Presentation: Stable/Uncomplicated  Clinical Presentation Rationale: based on medical and personal factors listed in PT evaluation  Clinical Decision Making (Complexity): Low complexity    PLAN OF CARE  Treatment Interventions:  Modalities: Biofeedback, E-stim, Ultrasound  Interventions: Gait Training, Manual Therapy, Neuromuscular Re-education, Therapeutic Activity, Therapeutic Exercise, Self-Care/Home Management    Long Term Goals     PT Goal 1  Goal Identifier: sitting  Goal Description: patient will be able to sit for 30 min with 1/10 pain or less  Rationale: to maximize safety and independence with performance of ADLs and functional tasks;to maximize safety and independence within the home;to maximize safety and independence within the community;to maximize safety and independence with self cares;to maximize safety and independence with transportation  Target Date: 12/18/24  PT Goal 2  Goal Identifier: ambulation  Goal Description: patient will be able to walk for 20 min with 2/10 pain or less  Rationale: to maximize safety and independence with performance of ADLs and functional tasks;to maximize safety and independence within the home;to maximize safety and independence within the community;to maximize safety and independence with self cares  Target Date: 12/18/24      Frequency of Treatment: 1x/wk progressing to 1 x every other week  Duration of Treatment: 2-3 months    Recommended Referrals to Other Professionals: n/a at present  Education Assessment:   Learner/Method: Patient    Risks and benefits of evaluation/treatment have been explained.   Patient/Family/caregiver agrees with Plan of Care.     Evaluation Time:     PT Eval, Low Complexity Minutes (79778): 20     Signing Clinician: Tracey Melendez PT

## 2024-09-30 ENCOUNTER — THERAPY VISIT (OUTPATIENT)
Dept: PHYSICAL THERAPY | Facility: CLINIC | Age: 59
End: 2024-09-30
Payer: COMMERCIAL

## 2024-09-30 DIAGNOSIS — R10.2 PELVIC PAIN IN FEMALE: Primary | ICD-10-CM

## 2024-09-30 PROCEDURE — 97110 THERAPEUTIC EXERCISES: CPT | Mod: GP

## 2024-09-30 PROCEDURE — 97140 MANUAL THERAPY 1/> REGIONS: CPT | Mod: GP

## 2024-09-30 PROCEDURE — 97530 THERAPEUTIC ACTIVITIES: CPT | Mod: GP

## 2024-10-21 SDOH — HEALTH STABILITY: PHYSICAL HEALTH: ON AVERAGE, HOW MANY DAYS PER WEEK DO YOU ENGAGE IN MODERATE TO STRENUOUS EXERCISE (LIKE A BRISK WALK)?: 6 DAYS

## 2024-10-21 SDOH — HEALTH STABILITY: PHYSICAL HEALTH: ON AVERAGE, HOW MANY MINUTES DO YOU ENGAGE IN EXERCISE AT THIS LEVEL?: 40 MIN

## 2024-10-21 ASSESSMENT — SOCIAL DETERMINANTS OF HEALTH (SDOH): HOW OFTEN DO YOU GET TOGETHER WITH FRIENDS OR RELATIVES?: TWICE A WEEK

## 2024-10-21 ASSESSMENT — ASTHMA QUESTIONNAIRES
QUESTION_1 LAST FOUR WEEKS HOW MUCH OF THE TIME DID YOUR ASTHMA KEEP YOU FROM GETTING AS MUCH DONE AT WORK, SCHOOL OR AT HOME: NONE OF THE TIME
QUESTION_2 LAST FOUR WEEKS HOW OFTEN HAVE YOU HAD SHORTNESS OF BREATH: NOT AT ALL
QUESTION_3 LAST FOUR WEEKS HOW OFTEN DID YOUR ASTHMA SYMPTOMS (WHEEZING, COUGHING, SHORTNESS OF BREATH, CHEST TIGHTNESS OR PAIN) WAKE YOU UP AT NIGHT OR EARLIER THAN USUAL IN THE MORNING: NOT AT ALL
QUESTION_4 LAST FOUR WEEKS HOW OFTEN HAVE YOU USED YOUR RESCUE INHALER OR NEBULIZER MEDICATION (SUCH AS ALBUTEROL): NOT AT ALL
QUESTION_5 LAST FOUR WEEKS HOW WOULD YOU RATE YOUR ASTHMA CONTROL: COMPLETELY CONTROLLED
ACT_TOTALSCORE: 25
ACT_TOTALSCORE: 25

## 2024-10-23 ENCOUNTER — OFFICE VISIT (OUTPATIENT)
Dept: FAMILY MEDICINE | Facility: CLINIC | Age: 59
End: 2024-10-23
Payer: COMMERCIAL

## 2024-10-23 VITALS
HEART RATE: 77 BPM | SYSTOLIC BLOOD PRESSURE: 128 MMHG | OXYGEN SATURATION: 98 % | HEIGHT: 63 IN | TEMPERATURE: 97.2 F | RESPIRATION RATE: 18 BRPM | DIASTOLIC BLOOD PRESSURE: 80 MMHG | WEIGHT: 171.1 LBS | BODY MASS INDEX: 30.32 KG/M2

## 2024-10-23 DIAGNOSIS — J45.30 MILD PERSISTENT ASTHMA WITHOUT COMPLICATION: ICD-10-CM

## 2024-10-23 DIAGNOSIS — F41.1 GAD (GENERALIZED ANXIETY DISORDER): ICD-10-CM

## 2024-10-23 DIAGNOSIS — F33.1 MODERATE RECURRENT MAJOR DEPRESSION (H): ICD-10-CM

## 2024-10-23 DIAGNOSIS — I10 BENIGN ESSENTIAL HYPERTENSION: ICD-10-CM

## 2024-10-23 DIAGNOSIS — F51.01 PRIMARY INSOMNIA: ICD-10-CM

## 2024-10-23 DIAGNOSIS — Z00.00 ROUTINE GENERAL MEDICAL EXAMINATION AT A HEALTH CARE FACILITY: Primary | ICD-10-CM

## 2024-10-23 LAB
ANION GAP SERPL CALCULATED.3IONS-SCNC: 10 MMOL/L (ref 7–15)
BUN SERPL-MCNC: 13.5 MG/DL (ref 6–20)
CALCIUM SERPL-MCNC: 9.3 MG/DL (ref 8.8–10.4)
CHLORIDE SERPL-SCNC: 106 MMOL/L (ref 98–107)
CHOLEST SERPL-MCNC: 289 MG/DL
CREAT SERPL-MCNC: 0.86 MG/DL (ref 0.51–0.95)
EGFRCR SERPLBLD CKD-EPI 2021: 78 ML/MIN/1.73M2
FASTING STATUS PATIENT QL REPORTED: YES
FASTING STATUS PATIENT QL REPORTED: YES
GLUCOSE SERPL-MCNC: 101 MG/DL (ref 70–99)
HCO3 SERPL-SCNC: 24 MMOL/L (ref 22–29)
HDLC SERPL-MCNC: 59 MG/DL
LDLC SERPL CALC-MCNC: 187 MG/DL
NONHDLC SERPL-MCNC: 230 MG/DL
POTASSIUM SERPL-SCNC: 4.1 MMOL/L (ref 3.4–5.3)
SODIUM SERPL-SCNC: 140 MMOL/L (ref 135–145)
TRIGL SERPL-MCNC: 213 MG/DL

## 2024-10-23 PROCEDURE — 80048 BASIC METABOLIC PNL TOTAL CA: CPT | Performed by: NURSE PRACTITIONER

## 2024-10-23 PROCEDURE — 90746 HEPB VACCINE 3 DOSE ADULT IM: CPT | Performed by: NURSE PRACTITIONER

## 2024-10-23 PROCEDURE — 99214 OFFICE O/P EST MOD 30 MIN: CPT | Mod: 25 | Performed by: NURSE PRACTITIONER

## 2024-10-23 PROCEDURE — 90673 RIV3 VACCINE NO PRESERV IM: CPT | Performed by: NURSE PRACTITIONER

## 2024-10-23 PROCEDURE — 99396 PREV VISIT EST AGE 40-64: CPT | Mod: 25 | Performed by: NURSE PRACTITIONER

## 2024-10-23 PROCEDURE — 80061 LIPID PANEL: CPT | Performed by: NURSE PRACTITIONER

## 2024-10-23 PROCEDURE — 91320 SARSCV2 VAC 30MCG TRS-SUC IM: CPT | Performed by: NURSE PRACTITIONER

## 2024-10-23 PROCEDURE — 90472 IMMUNIZATION ADMIN EACH ADD: CPT | Performed by: NURSE PRACTITIONER

## 2024-10-23 PROCEDURE — 90480 ADMN SARSCOV2 VAC 1/ONLY CMP: CPT | Performed by: NURSE PRACTITIONER

## 2024-10-23 PROCEDURE — 36415 COLL VENOUS BLD VENIPUNCTURE: CPT | Performed by: NURSE PRACTITIONER

## 2024-10-23 PROCEDURE — 90471 IMMUNIZATION ADMIN: CPT | Performed by: NURSE PRACTITIONER

## 2024-10-23 RX ORDER — ZOLPIDEM TARTRATE 12.5 MG/1
TABLET, FILM COATED, EXTENDED RELEASE ORAL
Qty: 90 TABLET | Refills: 1 | Status: SHIPPED | OUTPATIENT
Start: 2024-10-23

## 2024-10-23 RX ORDER — LOSARTAN POTASSIUM 50 MG/1
50 TABLET ORAL DAILY
Qty: 90 TABLET | Refills: 3 | Status: SHIPPED | OUTPATIENT
Start: 2024-10-23

## 2024-10-23 ASSESSMENT — PATIENT HEALTH QUESTIONNAIRE - PHQ9
10. IF YOU CHECKED OFF ANY PROBLEMS, HOW DIFFICULT HAVE THESE PROBLEMS MADE IT FOR YOU TO DO YOUR WORK, TAKE CARE OF THINGS AT HOME, OR GET ALONG WITH OTHER PEOPLE: NOT DIFFICULT AT ALL
SUM OF ALL RESPONSES TO PHQ QUESTIONS 1-9: 2
SUM OF ALL RESPONSES TO PHQ QUESTIONS 1-9: 2

## 2024-10-23 ASSESSMENT — PAIN SCALES - GENERAL: PAINLEVEL_OUTOF10: NO PAIN (0)

## 2024-10-23 NOTE — PROGRESS NOTES
"Preventive Care Visit  [facility]  Janey Aguirre, NP, Nurse Practitioner - Family  Oct 23, 2024      Assessment & Plan     (Z00.00) Routine general medical examination at a health care facility  (primary encounter diagnosis)  Comment:   Plan: Lipid panel reflex to direct LDL Fasting            (F41.1) KANIKA (generalized anxiety disorder)  Comment: stable  Plan: sertraline (ZOLOFT) 50 MG tablet        The current medical regimen is effective;  continue present plan and medications.     (F33.1) Moderate recurrent major depression (H)  Comment: stable  Plan: sertraline (ZOLOFT) 50 MG tablet        The current medical regimen is effective;  continue present plan and medications.     (F51.01) Primary insomnia  Comment: stable  Plan: zolpidem ER (AMBIEN CR) 12.5 MG CR tablet        The current medical regimen is effective;  continue present plan and medications.     (I10) Benign essential hypertension  Comment: at goal  Plan: losartan (COZAAR) 50 MG tablet, Basic metabolic        panel  (Ca, Cl, CO2, Creat, Gluc, K, Na, BUN)        The current medical regimen is effective;  continue present plan and medications.     (J45.30) Mild persistent asthma without complication  Comment: well-controlled  Plan: The current medical regimen is effective;  continue present plan and medications.             BMI  Estimated body mass index is 30.12 kg/m  as calculated from the following:    Height as of this encounter: 1.605 m (5' 3.2\").    Weight as of this encounter: 77.6 kg (171 lb 1.6 oz).       Counseling  Appropriate preventive services were addressed with this patient via screening, questionnaire, or discussion as appropriate for fall prevention, nutrition, physical activity, Tobacco-use cessation, social engagement, weight loss and cognition.  Checklist reviewing preventive services available has been given to the patient.  Reviewed patient's diet, addressing concerns and/or questions. "           Subjective   Prachi is a 59 year old, presenting for the following:  Physical        10/23/2024     9:23 AM   Additional Questions   Roomed by Natalie quezada   Accompanied by self          HPI  Her asthma has been well-controlled.    Her mood is stable and she is doing well on her current dose of Sertraline.    She is not checking blood pressures at home.            Health Care Directive  Patient does not have a Health Care Directive: Discussed advance care planning with patient; however, patient declined at this time.      10/21/2024   General Health   How would you rate your overall physical health? Good   Feel stress (tense, anxious, or unable to sleep) Not at all            10/21/2024   Nutrition   Three or more servings of calcium each day? Yes   Diet: Regular (no restrictions)   How many servings of fruit and vegetables per day? 4 or more   How many sweetened beverages each day? 0-1            10/21/2024   Exercise   Days per week of moderate/strenous exercise 6 days   Average minutes spent exercising at this level 40 min            10/21/2024   Social Factors   Frequency of gathering with friends or relatives Twice a week   Worry food won't last until get money to buy more No   Food not last or not have enough money for food? No   Do you have housing? (Housing is defined as stable permanent housing and does not include staying ouside in a car, in a tent, in an abandoned building, in an overnight shelter, or couch-surfing.) Yes   Are you worried about losing your housing? No   Lack of transportation? No   Unable to get utilities (heat,electricity)? No            10/23/2024   Fall Risk   Gait Speed Test (Document in seconds) 3.37   Gait Speed Test Interpretation Less than or equal to 5.00 seconds - PASS             10/21/2024   Dental   Dentist two times every year? Yes            10/21/2024   TB Screening   Were you born outside of the [place]? No          Today's PHQ-9 Score:       10/23/2024     9:08 AM   PHQ-9  SCORE   PHQ-9 Total Score MyChart 2 (Minimal depression)   PHQ-9 Total Score 2        Patient-reported         10/21/2024   Substance Use   Alcohol more than 3/day or more than 7/wk No   Do you use any other substances recreationally? No        Social History     Tobacco Use    Smoking status: Never    Smokeless tobacco: Never   Vaping Use    Vaping status: Never Used   Substance Use Topics    Alcohol use: Not Currently     Comment: minimal    Drug use: No           6/22/2023   LAST FHS-7 RESULTS   1st degree relative breast or ovarian cancer No   Any relative bilateral breast cancer No   Any male have breast cancer No   Any ONE woman have BOTH breast AND ovarian cancer No   Any woman with breast cancer before 50yrs No   2 or more relatives with breast AND/OR ovarian cancer No   2 or more relatives with breast AND/OR bowel cancer No                   10/21/2024   STI Screening   New sexual partner(s) since last STI/HIV test? No        History of abnormal Pap smear: YES - reflected in Problem List and Health Maintenance accordingly        Latest Ref Rng & Units 10/23/2023     4:46 PM 7/19/2022     3:55 PM 7/19/2017     6:27 PM   PAP / HPV   PAP  Negative for Intraepithelial Lesion or Malignancy (NILM)  Negative for Intraepithelial Lesion or Malignancy (NILM)     HPV 16 DNA Negative Negative  Positive  Negative    HPV 18 DNA Negative Negative  Negative  Negative    Other HR HPV Negative Negative  Positive  Negative      ASCVD Risk   The 10-year ASCVD risk score (Edison BARRIOS, et al., 2019) is: 4.8%    Values used to calculate the score:      Age: 59 years      Sex: Female      Is Non- : No      Diabetic: No      Tobacco smoker: No      Systolic Blood Pressure: 134 mmHg      Is BP treated: Yes      HDL Cholesterol: 48 mg/dL      Total Cholesterol: 243 mg/dL           Reviewed and updated as needed this visit by Provider                             Objective    Exam  /86 (BP Location:  "Right arm, Patient Position: Sitting, Cuff Size: Adult Regular)   Pulse 77   Temp 97.2  F (36.2  C) (Temporal)   Resp 18   Ht 1.605 m (5' 3.2\")   Wt 77.6 kg (171 lb 1.6 oz)   LMP  (LMP Unknown)   SpO2 98%   BMI 30.12 kg/m     Estimated body mass index is 30.12 kg/m  as calculated from the following:    Height as of this encounter: 1.605 m (5' 3.2\").    Weight as of this encounter: 77.6 kg (171 lb 1.6 oz).    Physical Exam  GENERAL: alert and no distress  EYES: Eyes grossly normal to inspection, PERRL and conjunctivae and sclerae normal  HENT: ear canals and TM's normal, nose and mouth without ulcers or lesions  NECK: no adenopathy, no asymmetry, masses, or scars  RESP: lungs clear to auscultation - no rales, rhonchi or wheezes  BREAST: normal without masses, tenderness or nipple discharge and no palpable axillary masses or adenopathy  CV: regular rate and rhythm, normal S1 S2, no S3 or S4, no murmur, click or rub, no peripheral edema  ABDOMEN: soft, nontender, no hepatosplenomegaly, no masses and bowel sounds normal  MS: no gross musculoskeletal defects noted, no edema  SKIN: no suspicious lesions or rashes  NEURO: Normal strength and tone, mentation intact and speech normal  PSYCH: mentation appears normal, affect normal/bright        Signed Electronically by: Janey Aguirre NP    Answers submitted by the patient for this visit:  Patient Health Questionnaire (Submitted on 10/23/2024)  If you checked off any problems, how difficult have these problems made it for you to do your work, take care of things at home, or get along with other people?: Not difficult at all  PHQ9 TOTAL SCORE: 2    "

## 2024-10-23 NOTE — PATIENT INSTRUCTIONS
Patient Education   Preventive Care Advice   This is general advice given by our system to help you stay healthy. However, your care team may have specific advice just for you. Please talk to your care team about your preventive care needs.  Nutrition  Eat 5 or more servings of fruits and vegetables each day.  Try wheat bread, brown rice and whole grain pasta (instead of white bread, rice, and pasta).  Get enough calcium and vitamin D. Check the label on foods and aim for 100% of the RDA (recommended daily allowance).  Lifestyle  Exercise at least 150 minutes each week  (30 minutes a day, 5 days a week).  Do muscle strengthening activities 2 days a week. These help control your weight and prevent disease.  No smoking.  Wear sunscreen to prevent skin cancer.  Have a dental exam and cleaning every 6 months.  Yearly exams  See your health care team every year to talk about:  Any changes in your health.  Any medicines your care team has prescribed.  Preventive care, family planning, and ways to prevent chronic diseases.  Shots (vaccines)   HPV shots (up to age 26), if you've never had them before.  Hepatitis B shots (up to age 59), if you've never had them before.  COVID-19 shot: Get this shot when it's due.  Flu shot: Get a flu shot every year.  Tetanus shot: Get a tetanus shot every 10 years.  Pneumococcal, hepatitis A, and RSV shots: Ask your care team if you need these based on your risk.  Shingles shot (for age 50 and up)  General health tests  Diabetes screening:  Starting at age 35, Get screened for diabetes at least every 3 years.  If you are younger than age 35, ask your care team if you should be screened for diabetes.  Cholesterol test: At age 39, start having a cholesterol test every 5 years, or more often if advised.  Bone density scan (DEXA): At age 50, ask your care team if you should have this scan for osteoporosis (brittle bones).  Hepatitis C: Get tested at least once in your life.  STIs (sexually  transmitted infections)  Before age 24: Ask your care team if you should be screened for STIs.  After age 24: Get screened for STIs if you're at risk. You are at risk for STIs (including HIV) if:  You are sexually active with more than one person.  You don't use condoms every time.  You or a partner was diagnosed with a sexually transmitted infection.  If you are at risk for HIV, ask about PrEP medicine to prevent HIV.  Get tested for HIV at least once in your life, whether you are at risk for HIV or not.  Cancer screening tests  Cervical cancer screening: If you have a cervix, begin getting regular cervical cancer screening tests starting at age 21.  Breast cancer scan (mammogram): If you've ever had breasts, begin having regular mammograms starting at age 40. This is a scan to check for breast cancer.  Colon cancer screening: It is important to start screening for colon cancer at age 45.  Have a colonoscopy test every 10 years (or more often if you're at risk) Or, ask your provider about stool tests like a FIT test every year or Cologuard test every 3 years.  To learn more about your testing options, visit:   .  For help making a decision, visit:   https://bit.ly/zk67667.  Prostate cancer screening test: If you have a prostate, ask your care team if a prostate cancer screening test (PSA) at age 55 is right for you.  Lung cancer screening: If you are a current or former smoker ages 50 to 80, ask your care team if ongoing lung cancer screenings are right for you.  For informational purposes only. Not to replace the advice of your health care provider. Copyright   2023 [facility]. All rights reserved. Clinically reviewed by the [facility]. [employer] 201838 - REV 01/24.  Preventing Falls: Care Instructions  Injuries and health problems such as trouble walking or poor eyesight can increase your risk of falling. So can some medicines. But there are things you can do to help  "prevent falls. You can exercise to get stronger. You can also arrange your home to make it safer.    Talk to your doctor about the medicines you take. Ask if any of them increase the risk of falls and whether they can be changed or stopped.   Try to exercise regularly. It can help improve your strength and balance. This can help lower your risk of falling.         Practice fall safety and prevention.   Wear low-heeled shoes that fit well and give your feet good support. Talk to your doctor if you have foot problems that make this hard.  Carry a cellphone or wear a medical alert device that you can use to call for help.  Use stepladders instead of chairs to reach high objects. Don't climb if you're at risk for falls. Ask for help, if needed.  Wear the correct eyeglasses, if you need them.        Make your home safer.   Remove rugs, cords, clutter, and furniture from walkways.  Keep your house well lit. Use night-lights in hallways and bathrooms.  Install and use sturdy handrails on stairways.  Wear nonskid footwear, even inside. Don't walk barefoot or in socks without shoes.        Be safe outside.   Use handrails, curb cuts, and ramps whenever possible.  Keep your hands free by using a shoulder bag or backpack.  Try to walk in well-lit areas. Watch out for uneven ground, changes in pavement, and debris.  Be careful in the winter. Walk on the grass or gravel when sidewalks are slippery. Use de-icer on steps and walkways. Add non-slip devices to shoes.    Put grab bars and nonskid mats in your shower or tub and near the toilet. Try to use a shower chair or bath bench when bathing.   Get into a tub or shower by putting in your weaker leg first. Get out with your strong side first. Have a phone or medical alert device in the bathroom with you.   Where can you learn more?  Go to https://www.[employer].net/patiented  Enter G117 in the search box to learn more about \"Preventing Falls: Care Instructions.\"  Current as of: " July 17, 2023  Content Version: 14.2 2024 [facility] [employer].   Care instructions adapted under license by your healthcare professional. If you have questions about a medical condition or this instruction, always ask your healthcare professional. [employer] disclaims any warranty or liability for your use of this information.

## 2024-10-23 NOTE — NURSING NOTE
Prior to immunization administration, verified patients identity using patient s name and date of birth. Please see Immunization Activity for additional information.     Screening Questionnaire for Adult Immunization    Are you sick today?   No   Do you have allergies to medications, food, a vaccine component or latex?   No   Have you ever had a serious reaction after receiving a vaccination?   No   Do you have a long-term health problem with heart, lung, kidney, or metabolic disease (e.g., diabetes), asthma, a blood disorder, no spleen, complement component deficiency, a cochlear implant, or a spinal fluid leak?  Are you on long-term aspirin therapy?   No   Do you have cancer, leukemia, HIV/AIDS, or any other immune system problem?   No   Do you have a parent, brother, or sister with an immune system problem?   No   In the past 3 months, have you taken medications that affect  your immune system, such as prednisone, other steroids, or anticancer drugs; drugs for the treatment of rheumatoid arthritis, Crohn s disease, or psoriasis; or have you had radiation treatments?   No   Have you had a seizure, or a brain or other nervous system problem?   No   During the past year, have you received a transfusion of blood or blood    products, or been given immune (gamma) globulin or antiviral drug?   No   For women: Are you pregnant or is there a chance you could become       pregnant during the next month?   No   Have you received any vaccinations in the past 4 weeks?   No     Immunization questionnaire answers were all negative.      Patient instructed to remain in clinic for 15 minutes afterwards, and to report any adverse reactions.     Screening performed by Alexandra Brito MA on 10/23/2024 at 10:14 AM.

## 2024-11-01 ENCOUNTER — THERAPY VISIT (OUTPATIENT)
Dept: PHYSICAL THERAPY | Facility: CLINIC | Age: 59
End: 2024-11-01
Payer: COMMERCIAL

## 2024-11-01 DIAGNOSIS — R10.2 PELVIC PAIN IN FEMALE: Primary | ICD-10-CM

## 2024-11-01 PROCEDURE — 97530 THERAPEUTIC ACTIVITIES: CPT | Mod: GP

## 2024-11-01 PROCEDURE — 97112 NEUROMUSCULAR REEDUCATION: CPT | Mod: GP

## 2024-11-15 ENCOUNTER — THERAPY VISIT (OUTPATIENT)
Dept: PHYSICAL THERAPY | Facility: CLINIC | Age: 59
End: 2024-11-15
Payer: COMMERCIAL

## 2024-11-15 DIAGNOSIS — R10.2 PELVIC PAIN IN FEMALE: Primary | ICD-10-CM

## 2024-11-15 PROCEDURE — 97110 THERAPEUTIC EXERCISES: CPT | Mod: GP

## 2024-11-15 PROCEDURE — 97530 THERAPEUTIC ACTIVITIES: CPT | Mod: GP

## 2025-01-13 PROBLEM — R10.2 PELVIC PAIN IN FEMALE: Status: RESOLVED | Noted: 2024-09-25 | Resolved: 2025-01-13

## 2025-03-31 ENCOUNTER — OFFICE VISIT (OUTPATIENT)
Dept: FAMILY MEDICINE | Facility: CLINIC | Age: 60
End: 2025-03-31
Payer: COMMERCIAL

## 2025-03-31 VITALS
SYSTOLIC BLOOD PRESSURE: 118 MMHG | RESPIRATION RATE: 16 BRPM | WEIGHT: 175 LBS | HEIGHT: 63 IN | BODY MASS INDEX: 31.01 KG/M2 | DIASTOLIC BLOOD PRESSURE: 79 MMHG | TEMPERATURE: 97.2 F | HEART RATE: 76 BPM | OXYGEN SATURATION: 96 %

## 2025-03-31 DIAGNOSIS — I10 BENIGN ESSENTIAL HYPERTENSION: ICD-10-CM

## 2025-03-31 DIAGNOSIS — E66.811 CLASS 1 OBESITY DUE TO EXCESS CALORIES WITH SERIOUS COMORBIDITY AND BODY MASS INDEX (BMI) OF 31.0 TO 31.9 IN ADULT: Primary | ICD-10-CM

## 2025-03-31 DIAGNOSIS — R73.03 PREDIABETES: ICD-10-CM

## 2025-03-31 DIAGNOSIS — Z13.820 SCREENING FOR OSTEOPOROSIS: ICD-10-CM

## 2025-03-31 DIAGNOSIS — E66.09 CLASS 1 OBESITY DUE TO EXCESS CALORIES WITH SERIOUS COMORBIDITY AND BODY MASS INDEX (BMI) OF 31.0 TO 31.9 IN ADULT: Primary | ICD-10-CM

## 2025-03-31 DIAGNOSIS — E78.2 MIXED HYPERLIPIDEMIA: ICD-10-CM

## 2025-03-31 DIAGNOSIS — Z12.31 ENCOUNTER FOR SCREENING MAMMOGRAM FOR BREAST CANCER: ICD-10-CM

## 2025-03-31 DIAGNOSIS — Z78.0 POSTMENOPAUSAL STATE: ICD-10-CM

## 2025-03-31 DIAGNOSIS — Z13.1 SCREENING FOR DIABETES MELLITUS: ICD-10-CM

## 2025-03-31 LAB
EST. AVERAGE GLUCOSE BLD GHB EST-MCNC: 120 MG/DL
HBA1C MFR BLD: 5.8 % (ref 0–5.6)

## 2025-03-31 PROCEDURE — 1126F AMNT PAIN NOTED NONE PRSNT: CPT | Performed by: NURSE PRACTITIONER

## 2025-03-31 PROCEDURE — 83036 HEMOGLOBIN GLYCOSYLATED A1C: CPT | Performed by: NURSE PRACTITIONER

## 2025-03-31 PROCEDURE — 3074F SYST BP LT 130 MM HG: CPT | Performed by: NURSE PRACTITIONER

## 2025-03-31 PROCEDURE — 3078F DIAST BP <80 MM HG: CPT | Performed by: NURSE PRACTITIONER

## 2025-03-31 PROCEDURE — G2211 COMPLEX E/M VISIT ADD ON: HCPCS | Performed by: NURSE PRACTITIONER

## 2025-03-31 PROCEDURE — 99214 OFFICE O/P EST MOD 30 MIN: CPT | Performed by: NURSE PRACTITIONER

## 2025-03-31 PROCEDURE — 36415 COLL VENOUS BLD VENIPUNCTURE: CPT | Performed by: NURSE PRACTITIONER

## 2025-03-31 SDOH — HEALTH STABILITY: PHYSICAL HEALTH: ON AVERAGE, HOW MANY DAYS PER WEEK DO YOU ENGAGE IN MODERATE TO STRENUOUS EXERCISE (LIKE A BRISK WALK)?: 5 DAYS

## 2025-03-31 SDOH — HEALTH STABILITY: PHYSICAL HEALTH: ON AVERAGE, HOW MANY MINUTES DO YOU ENGAGE IN EXERCISE AT THIS LEVEL?: 50 MIN

## 2025-03-31 ASSESSMENT — SOCIAL DETERMINANTS OF HEALTH (SDOH): HOW OFTEN DO YOU GET TOGETHER WITH FRIENDS OR RELATIVES?: THREE TIMES A WEEK

## 2025-03-31 ASSESSMENT — PAIN SCALES - GENERAL: PAINLEVEL_OUTOF10: NO PAIN (0)

## 2025-03-31 ASSESSMENT — PATIENT HEALTH QUESTIONNAIRE - PHQ9: SUM OF ALL RESPONSES TO PHQ QUESTIONS 1-9: 0

## 2025-03-31 NOTE — PATIENT INSTRUCTIONS
SIDE EFFECTS  This medication may have several common side effects.    CONSTIPATION is one of the most common. The constipation is a result of the medication slowing gastric emptying time.    There are a few things that you can do to reduce your chances of developing constipation including  1) Increase your fiber intake 30 g/day. Including vegetables, whole grains.  2) Increasing your water intake to at least 64 oz of water daily.  3) You may need to add over the counter fiber supplements such as Metamucil or Benefiber.     If your constipation is severe, you can also consider taking over-the-counter medications such as Colace or senna which help to soften the stool and make it easier to pass or you could consider MiraLAX on a daily basis as a way to stimulate bowel movements.    If your constipation continues after the use of the above suggestions please make an appointment with us to discuss alternative options.     Please seek in person care if at any time you develop severe abdominal pain or nausea that is a result of the constipation.     NAUSEA/HEARTBURN: Nausea tends to improve over time as your body gets used to taking the medication.  1) Avoid fried, greasy, or fatty foods and foods high in sugar.  2) Eat slowly, and eat smaller meals. If needed, move to 6 small meals a day Space meals out 6-8 hours apart.  3) Eat foods that are light and bland.  4) Drink clear or ice-cold drinks.  5) You can try to inhale deeply an alcohol pad holding a minimum of a centimeter from your nose.  6) Try moving your injection site to the thigh region; this has been noted to decrease side effects.  7) For severe nausea that is not tolerable, please do an e-visit and an anti-nausea medication called Zofran can be prescribed.    BELCHIN) You can try over the counter GasX.     DIARRHEA   1) Keep up with hydration, make sure to hydrate throughout the day.   2) Pepto Bismol - take as directed on bottle, please note it will  turn stool dark/black.  3) After about 2 days of diarrhea, if still persistent, try Imodium (OTC also) and just take it as directed on the packaging.    FATIGUE:   1) You can try adding in over the counter vitamin B12.    LOCALIZED SKIN REACTION:   1) Use over the counter Cortaid  2) Take your injection out of the refrigerator 10 minutes prior to injection.

## 2025-03-31 NOTE — PROGRESS NOTES
1. Class 1 obesity due to excess calories with serious comorbidity and body mass index (BMI) of 31.0 to 31.9 in adult (Primary)  2. Mixed hyperlipidemia  3. Prediabetes  4. Benign essential hypertension    Patient with BMI of 31.00, hypertension, prediabetes, and hyperlipidemia who has failed conservative weight loss interventions. Will initiate Tirzepatide - plan to see back in 3 months via video visit. She will reach out sooner if weight loss stalls on the 5mg dose.  Discussed the use and indication of this medication as well as potential side effects.   - tirzepatide-weight management (ZEPBOUND) 2.5 MG/0.5ML vial; Inject 0.5 mLs (2.5 mg) subcutaneously once a week.  Dispense: 2 mL; Refill: 0  - tirzepatide-Weight Management (ZEPBOUND) 5 MG/0.5ML prefilled pen; Inject 0.5 mLs (5 mg) subcutaneously every 7 days.  Dispense: 2 mL; Refill: 2  Will use Zepbound as adjunct to continuing with lifestyle modifications, exercise and diet.     5. Encounter for screening mammogram for breast cancer    - MA Screen Bilateral w/[facility]; Future      7. Postmenopausal state  8. Screening for osteoporosis    - DX Bone Density; Future    The longitudinal plan of care for the diagnosis(es)/condition(s) as documented were addressed during this visit. Due to the added complexity in care, I will continue to support Prachi in the subsequent management and with ongoing continuity of care.    Subjective   Prachi is a 60 year old, presenting for the following health issues:  Lipids and Hypertension      3/31/2025     3:52 PM   Additional Questions   Roomed by Rosie GUTIERREZ      Weight gain - gained 20 lbs on gabapentin a year ago, has not been able to lose weight despite increasing activity, has tried to modify diet, feels like the weight is keeping her from being active.     Mood is well controlled on Sertraline    Asthma is well controlled        Review of Systems  Constitutional, HEENT, cardiovascular, pulmonary, gi and gu systems are  "negative, except as otherwise noted.      Objective    /79   Pulse 76   Temp 97.2  F (36.2  C) (Temporal)   Resp 16   Ht 1.6 m (5' 3\")   Wt 79.4 kg (175 lb)   LMP  (LMP Unknown)   SpO2 96%   BMI 31.00 kg/m    Body mass index is 31 kg/m .  Physical Exam   GENERAL: alert and no distress  NECK: no adenopathy, no asymmetry, masses, or scars  RESP: lungs clear to auscultation - no rales, rhonchi or wheezes  CV: regular rate and rhythm, normal S1 S2, no S3 or S4, no murmur, click or rub, no peripheral edema    Results for orders placed or performed in visit on 03/31/25 (from the past 24 hours)   Hemoglobin A1c   Result Value Ref Range    Estimated Average Glucose 120 (H) <117 mg/dL    Hemoglobin A1C 5.8 (H) 0.0 - 5.6 %         Tatianna Layne, Student Nurse Practitioner   Signed Electronically by: Janey Aguirre NP    "

## 2025-04-01 ENCOUNTER — PATIENT OUTREACH (OUTPATIENT)
Dept: CARE COORDINATION | Facility: CLINIC | Age: 60
End: 2025-04-01
Payer: COMMERCIAL

## 2025-04-01 ENCOUNTER — TELEPHONE (OUTPATIENT)
Dept: FAMILY MEDICINE | Facility: CLINIC | Age: 60
End: 2025-04-01
Payer: COMMERCIAL

## 2025-04-01 DIAGNOSIS — E66.811 CLASS 1 OBESITY WITH SERIOUS COMORBIDITY AND BODY MASS INDEX (BMI) OF 31.0 TO 31.9 IN ADULT, UNSPECIFIED OBESITY TYPE: Primary | ICD-10-CM

## 2025-04-01 DIAGNOSIS — I10 BENIGN ESSENTIAL HYPERTENSION: ICD-10-CM

## 2025-04-01 DIAGNOSIS — E78.2 MIXED HYPERLIPIDEMIA: ICD-10-CM

## 2025-04-01 DIAGNOSIS — G47.33 OSA (OBSTRUCTIVE SLEEP APNEA): ICD-10-CM

## 2025-04-01 DIAGNOSIS — R73.03 PREDIABETES: ICD-10-CM

## 2025-04-01 NOTE — TELEPHONE ENCOUNTER
PRIOR AUTHORIZATION DENIED    Medication: ZEPBOUND 2.5 MG/0.5ML SC SOAJ  Insurance Company: [employer] ([facility]) - Phone [phone] Fax [phone]  Denial Date: 4/1/2025  Denial Reason(s): Is there going to be adjunct lifestyle modification with this medication?      Appeal Information:       Patient Notified: No

## 2025-04-01 NOTE — TELEPHONE ENCOUNTER
Nathalie--- PA was denied. Would you like to send a letter of appeal? Alternative prescription?    Librado Monte, BSN, PHN, RN-[facility]

## 2025-04-09 NOTE — TELEPHONE ENCOUNTER
4-9-25    Great news -- Iliana Cmaargo  Rx has been approved thru 10-1-2025 --please contact her [employer] or other pharmacy? home delivery and have them re-run claim on todays date or after and it should go thru insurance.    Justin Martinez [facility].  Medication Therapy Management Provider  [phone]

## 2025-05-08 DIAGNOSIS — F33.1 MODERATE RECURRENT MAJOR DEPRESSION (H): ICD-10-CM

## 2025-05-08 DIAGNOSIS — F41.1 GAD (GENERALIZED ANXIETY DISORDER): ICD-10-CM

## 2025-05-08 NOTE — TELEPHONE ENCOUNTER
Responded to the patient through MyChart.     Shireen Orlando RN  [facility]    
Responded to the patient through MyChart.     Shireen Orlando RN  [facility]    
98

## 2025-06-30 ENCOUNTER — MYC REFILL (OUTPATIENT)
Dept: FAMILY MEDICINE | Facility: CLINIC | Age: 60
End: 2025-06-30
Payer: COMMERCIAL

## 2025-06-30 DIAGNOSIS — I10 BENIGN ESSENTIAL HYPERTENSION: ICD-10-CM

## 2025-06-30 DIAGNOSIS — R73.03 PREDIABETES: ICD-10-CM

## 2025-06-30 DIAGNOSIS — E66.811 CLASS 1 OBESITY DUE TO EXCESS CALORIES WITH SERIOUS COMORBIDITY AND BODY MASS INDEX (BMI) OF 31.0 TO 31.9 IN ADULT: ICD-10-CM

## 2025-06-30 DIAGNOSIS — E66.09 CLASS 1 OBESITY DUE TO EXCESS CALORIES WITH SERIOUS COMORBIDITY AND BODY MASS INDEX (BMI) OF 31.0 TO 31.9 IN ADULT: ICD-10-CM

## 2025-06-30 DIAGNOSIS — E78.2 MIXED HYPERLIPIDEMIA: ICD-10-CM

## 2025-07-30 ENCOUNTER — HOSPITAL ENCOUNTER (OUTPATIENT)
Dept: BONE DENSITY | Facility: CLINIC | Age: 60
Discharge: HOME | End: 2025-07-30
Attending: NURSE PRACTITIONER
Payer: COMMERCIAL

## 2025-07-30 ENCOUNTER — HOSPITAL ENCOUNTER (OUTPATIENT)
Dept: MAMMOGRAPHY | Facility: CLINIC | Age: 60
Discharge: HOME | End: 2025-07-30
Attending: NURSE PRACTITIONER
Payer: COMMERCIAL

## 2025-07-30 DIAGNOSIS — Z13.820 SCREENING FOR OSTEOPOROSIS: ICD-10-CM

## 2025-07-30 DIAGNOSIS — Z12.31 ENCOUNTER FOR SCREENING MAMMOGRAM FOR BREAST CANCER: ICD-10-CM

## 2025-07-30 PROCEDURE — 77067 SCR MAMMO BI INCL CAD: CPT

## 2025-07-30 PROCEDURE — 77080 DXA BONE DENSITY AXIAL: CPT

## 2025-08-02 DIAGNOSIS — E66.811 CLASS 1 OBESITY DUE TO EXCESS CALORIES WITH SERIOUS COMORBIDITY AND BODY MASS INDEX (BMI) OF 31.0 TO 31.9 IN ADULT: ICD-10-CM

## 2025-08-02 DIAGNOSIS — E78.2 MIXED HYPERLIPIDEMIA: ICD-10-CM

## 2025-08-02 DIAGNOSIS — I10 BENIGN ESSENTIAL HYPERTENSION: ICD-10-CM

## 2025-08-02 DIAGNOSIS — E66.09 CLASS 1 OBESITY DUE TO EXCESS CALORIES WITH SERIOUS COMORBIDITY AND BODY MASS INDEX (BMI) OF 31.0 TO 31.9 IN ADULT: ICD-10-CM

## 2025-08-02 DIAGNOSIS — R73.03 PREDIABETES: ICD-10-CM

## 2025-08-04 RX ORDER — TIRZEPATIDE 5 MG/.5ML
INJECTION, SOLUTION SUBCUTANEOUS
Qty: 2 ML | Refills: 0 | Status: SHIPPED | OUTPATIENT
Start: 2025-08-04 | End: 2025-08-06

## 2025-08-06 ENCOUNTER — VIRTUAL VISIT (OUTPATIENT)
Dept: FAMILY MEDICINE | Facility: CLINIC | Age: 60
End: 2025-08-06
Payer: COMMERCIAL

## 2025-08-06 DIAGNOSIS — I10 BENIGN ESSENTIAL HYPERTENSION: ICD-10-CM

## 2025-08-06 DIAGNOSIS — E66.09 CLASS 1 OBESITY DUE TO EXCESS CALORIES WITH SERIOUS COMORBIDITY AND BODY MASS INDEX (BMI) OF 31.0 TO 31.9 IN ADULT: Primary | ICD-10-CM

## 2025-08-06 DIAGNOSIS — E78.2 MIXED HYPERLIPIDEMIA: ICD-10-CM

## 2025-08-06 DIAGNOSIS — R73.03 PREDIABETES: ICD-10-CM

## 2025-08-06 DIAGNOSIS — E66.811 CLASS 1 OBESITY DUE TO EXCESS CALORIES WITH SERIOUS COMORBIDITY AND BODY MASS INDEX (BMI) OF 31.0 TO 31.9 IN ADULT: Primary | ICD-10-CM

## 2025-08-06 PROCEDURE — 98006 SYNCH AUDIO-VIDEO EST MOD 30: CPT | Performed by: NURSE PRACTITIONER

## 2025-08-06 ASSESSMENT — ASTHMA QUESTIONNAIRES
QUESTION_5 LAST FOUR WEEKS HOW WOULD YOU RATE YOUR ASTHMA CONTROL: COMPLETELY CONTROLLED
QUESTION_4 LAST FOUR WEEKS HOW OFTEN HAVE YOU USED YOUR RESCUE INHALER OR NEBULIZER MEDICATION (SUCH AS ALBUTEROL): NOT AT ALL
ACT_TOTALSCORE: 25
QUESTION_1 LAST FOUR WEEKS HOW MUCH OF THE TIME DID YOUR ASTHMA KEEP YOU FROM GETTING AS MUCH DONE AT WORK, SCHOOL OR AT HOME: NONE OF THE TIME
QUESTION_2 LAST FOUR WEEKS HOW OFTEN HAVE YOU HAD SHORTNESS OF BREATH: NOT AT ALL
QUESTION_3 LAST FOUR WEEKS HOW OFTEN DID YOUR ASTHMA SYMPTOMS (WHEEZING, COUGHING, SHORTNESS OF BREATH, CHEST TIGHTNESS OR PAIN) WAKE YOU UP AT NIGHT OR EARLIER THAN USUAL IN THE MORNING: NOT AT ALL

## (undated) DEVICE — PACK LAP CHOLE CUSTOM ASC

## (undated) DEVICE — SOL NACL 0.9% IRRIG 1000ML BOTTLE 2F7124

## (undated) DEVICE — SOL WATER IRRIG 1000ML BOTTLE 2F7114

## (undated) DEVICE — SU VICRYL 0 CT-2 27" J334H

## (undated) DEVICE — SU MONOCRYL 4-0 PS-2 27" UND Y426H

## (undated) DEVICE — TUBING IRRIG TUR Y TYPE 96" LF 6543-01

## (undated) DEVICE — LINEN POUCH DBL 5427

## (undated) DEVICE — ESU GROUND PAD ADULT W/CORD E7507

## (undated) DEVICE — LINEN TOWEL PACK X5 5464

## (undated) DEVICE — TUBING SMOKE EVAC PNEUVIEW 9660-XE

## (undated) DEVICE — SPECIMEN CONTAINER W/10% BUFFERED FORMALIN 120ML 591201

## (undated) DEVICE — CATH URETERAL OPEN END TIP 05FR 70CM 134105LT / 134105RT

## (undated) DEVICE — GLOVE PROTEXIS W/NEU-THERA 7.5  2D73TE75

## (undated) DEVICE — SOL NACL 0.9% IRRIG 3000ML BAG 2B7477

## (undated) DEVICE — JELLY LUBRICATING SURGILUBE 2OZ TUBE

## (undated) DEVICE — LINEN GOWN XLG 5407

## (undated) DEVICE — SOL NACL 0.9% IRRIG 500ML BOTTLE 2F7123

## (undated) DEVICE — NDL INSUFFLATION 14GA STEP S100000

## (undated) DEVICE — GLOVE PROTEXIS POWDER FREE SMT 7.5  2D72PT75X

## (undated) DEVICE — SYSTEM CLEARIFY VISUALIZATION 21-345

## (undated) DEVICE — LINEN GOWN X4 5410

## (undated) DEVICE — ADAPTER SCOPE UROLOK II LF M0067301400

## (undated) DEVICE — SHEATH URETERAL ACCESS NAVIGATOR HD 11/13FRX36CM M0062502220

## (undated) DEVICE — CLIP APPLIER ENDO 05MM MED/LG 176630

## (undated) DEVICE — DRAPE C-ARM W/STRAPS 42X72" 07-CA104

## (undated) DEVICE — GOWN IMPERVIOUS SPECIALTY XLG/XLONG 32474

## (undated) DEVICE — SYR 30ML LL W/O NDL 302832

## (undated) DEVICE — ENDO TROCAR 05MM VERSASTEP VS101005

## (undated) DEVICE — DRSG GAUZE 4X4" 3033

## (undated) DEVICE — BASKET STONE EXTRACTOR NITINOL NCIRCLE 1.5FRX115CM G46206

## (undated) DEVICE — STRAP KNEE/BODY 31143004

## (undated) DEVICE — ESU PENCIL W/COATED BLADE E2450H

## (undated) DEVICE — ANTIFOG SOLUTION W/FOAM PAD 31142527

## (undated) DEVICE — SUCTION MANIFOLD DORNOCH ULTRA CART UL-CL500

## (undated) DEVICE — TUBING EXTENSION SET 20" B1327

## (undated) DEVICE — SPECIMEN CONTAINER 5OZ STERILE 2600SA

## (undated) DEVICE — PREP CHLORAPREP 26ML TINTED ORANGE  260815

## (undated) DEVICE — RAD RX CONRAY 60% (50ML) 095305 CHARGE PER ML

## (undated) DEVICE — ENDO TROCAR BLUNT TIP KII BALLOON 12X100MM C0R47

## (undated) DEVICE — GUIDEWIRE SENSOR DUAL FLEX STR 0.035"X150CM M0066703080

## (undated) DEVICE — SYR 50ML LL W/O NDL 309653

## (undated) DEVICE — DRSG BANDAID 1X3" FABRIC CURITY LATEX FREE KC44101

## (undated) DEVICE — DRSG STERI STRIP 1/2X4" R1547

## (undated) DEVICE — Device

## (undated) DEVICE — LASER FIBER HOLMIUM FLEXIVA 200UM M0068403910 840-391

## (undated) DEVICE — PAD CHUX UNDERPAD 30X30"

## (undated) DEVICE — SUCTION MANIFOLD NEPTUNE 2 SYS 1 PORT 702-025-000

## (undated) RX ORDER — PROPOFOL 10 MG/ML
INJECTION, EMULSION INTRAVENOUS
Status: DISPENSED
Start: 2018-07-16

## (undated) RX ORDER — BUPIVACAINE HYDROCHLORIDE 2.5 MG/ML
INJECTION, SOLUTION EPIDURAL; INFILTRATION; INTRACAUDAL
Status: DISPENSED
Start: 2018-07-16

## (undated) RX ORDER — DEXAMETHASONE SODIUM PHOSPHATE 4 MG/ML
INJECTION, SOLUTION INTRA-ARTICULAR; INTRALESIONAL; INTRAMUSCULAR; INTRAVENOUS; SOFT TISSUE
Status: DISPENSED
Start: 2017-11-30

## (undated) RX ORDER — FENTANYL CITRATE 50 UG/ML
INJECTION, SOLUTION INTRAMUSCULAR; INTRAVENOUS
Status: DISPENSED
Start: 2018-07-16

## (undated) RX ORDER — ONDANSETRON 2 MG/ML
INJECTION INTRAMUSCULAR; INTRAVENOUS
Status: DISPENSED
Start: 2017-11-30

## (undated) RX ORDER — ROCURONIUM BROMIDE 50 MG/5 ML
SYRINGE (ML) INTRAVENOUS
Status: DISPENSED
Start: 2017-11-30

## (undated) RX ORDER — KETOROLAC TROMETHAMINE 30 MG/ML
INJECTION, SOLUTION INTRAMUSCULAR; INTRAVENOUS
Status: DISPENSED
Start: 2018-07-16

## (undated) RX ORDER — GLYCOPYRROLATE 0.2 MG/ML
INJECTION INTRAMUSCULAR; INTRAVENOUS
Status: DISPENSED
Start: 2018-07-16

## (undated) RX ORDER — ONDANSETRON 2 MG/ML
INJECTION INTRAMUSCULAR; INTRAVENOUS
Status: DISPENSED
Start: 2018-07-16

## (undated) RX ORDER — FENTANYL CITRATE 50 UG/ML
INJECTION, SOLUTION INTRAMUSCULAR; INTRAVENOUS
Status: DISPENSED
Start: 2017-11-30

## (undated) RX ORDER — GABAPENTIN 300 MG/1
CAPSULE ORAL
Status: DISPENSED
Start: 2018-07-16

## (undated) RX ORDER — ACETAMINOPHEN 325 MG/1
TABLET ORAL
Status: DISPENSED
Start: 2018-07-16

## (undated) RX ORDER — PROPOFOL 10 MG/ML
INJECTION, EMULSION INTRAVENOUS
Status: DISPENSED
Start: 2017-11-30

## (undated) RX ORDER — DEXAMETHASONE SODIUM PHOSPHATE 4 MG/ML
INJECTION, SOLUTION INTRA-ARTICULAR; INTRALESIONAL; INTRAMUSCULAR; INTRAVENOUS; SOFT TISSUE
Status: DISPENSED
Start: 2018-07-16

## (undated) RX ORDER — PHENYLEPHRINE HCL IN 0.9% NACL 1 MG/10 ML
SYRINGE (ML) INTRAVENOUS
Status: DISPENSED
Start: 2017-11-30

## (undated) RX ORDER — SODIUM CHLORIDE, SODIUM LACTATE, POTASSIUM CHLORIDE, CALCIUM CHLORIDE 600; 310; 30; 20 MG/100ML; MG/100ML; MG/100ML; MG/100ML
INJECTION, SOLUTION INTRAVENOUS
Status: DISPENSED
Start: 2017-11-30

## (undated) RX ORDER — LIDOCAINE HYDROCHLORIDE 20 MG/ML
INJECTION, SOLUTION EPIDURAL; INFILTRATION; INTRACAUDAL; PERINEURAL
Status: DISPENSED
Start: 2018-07-16

## (undated) RX ORDER — LIDOCAINE HYDROCHLORIDE 20 MG/ML
INJECTION, SOLUTION EPIDURAL; INFILTRATION; INTRACAUDAL; PERINEURAL
Status: DISPENSED
Start: 2017-11-30

## (undated) RX ORDER — CEFAZOLIN SODIUM 2 G/100ML
INJECTION, SOLUTION INTRAVENOUS
Status: DISPENSED
Start: 2017-11-30